# Patient Record
Sex: FEMALE | Race: BLACK OR AFRICAN AMERICAN | Employment: FULL TIME | URBAN - METROPOLITAN AREA
[De-identification: names, ages, dates, MRNs, and addresses within clinical notes are randomized per-mention and may not be internally consistent; named-entity substitution may affect disease eponyms.]

---

## 2017-02-27 DIAGNOSIS — E28.2 POLYCYSTIC OVARIAN DISEASE: ICD-10-CM

## 2017-03-01 RX ORDER — NORGESTIMATE AND ETHINYL ESTRADIOL
KIT
Qty: 28 TABLET | Refills: 5 | Status: SHIPPED | OUTPATIENT
Start: 2017-03-01 | End: 2018-07-06 | Stop reason: SDUPTHER

## 2017-03-03 DIAGNOSIS — E28.2 POLYCYSTIC OVARIAN DISEASE: ICD-10-CM

## 2017-03-03 RX ORDER — NORGESTIMATE AND ETHINYL ESTRADIOL 7DAYSX3 28
KIT ORAL
Qty: 28 TABLET | Refills: 5 | Status: CANCELLED | OUTPATIENT
Start: 2017-03-03

## 2017-03-03 RX ORDER — NORGESTIMATE AND ETHINYL ESTRADIOL 7DAYSX3 28
KIT ORAL
Qty: 28 TABLET | Refills: 1 | Status: SHIPPED | OUTPATIENT
Start: 2017-03-03 | End: 2017-09-25 | Stop reason: SDUPTHER

## 2017-03-17 ENCOUNTER — OFFICE VISIT (OUTPATIENT)
Dept: FAMILY MEDICINE CLINIC | Age: 27
End: 2017-03-17

## 2017-03-17 VITALS
DIASTOLIC BLOOD PRESSURE: 78 MMHG | HEART RATE: 87 BPM | TEMPERATURE: 97.5 F | SYSTOLIC BLOOD PRESSURE: 147 MMHG | BODY MASS INDEX: 77.73 KG/M2 | OXYGEN SATURATION: 97 % | WEIGHT: 293 LBS

## 2017-03-17 DIAGNOSIS — G47.33 OBSTRUCTIVE SLEEP APNEA: ICD-10-CM

## 2017-03-17 DIAGNOSIS — E66.01 MORBID OBESITY, UNSPECIFIED OBESITY TYPE (HCC): ICD-10-CM

## 2017-03-17 DIAGNOSIS — J30.9 ALLERGIC RHINITIS, UNSPECIFIED ALLERGIC RHINITIS TRIGGER, UNSPECIFIED RHINITIS SEASONALITY: ICD-10-CM

## 2017-03-17 DIAGNOSIS — E10.9 TYPE 1 DIABETES MELLITUS WITHOUT COMPLICATION (HCC): Primary | ICD-10-CM

## 2017-03-17 DIAGNOSIS — L72.3 SEBACEOUS CYST: ICD-10-CM

## 2017-03-17 DIAGNOSIS — E78.00 PURE HYPERCHOLESTEROLEMIA: ICD-10-CM

## 2017-03-17 LAB
A/G RATIO: 1.5 (ref 1.1–2.2)
ALBUMIN SERPL-MCNC: 4.3 G/DL (ref 3.4–5)
ALP BLD-CCNC: 89 U/L (ref 40–129)
ALT SERPL-CCNC: 10 U/L (ref 10–40)
ANION GAP SERPL CALCULATED.3IONS-SCNC: 18 MMOL/L (ref 3–16)
AST SERPL-CCNC: 9 U/L (ref 15–37)
BILIRUB SERPL-MCNC: 0.4 MG/DL (ref 0–1)
BUN BLDV-MCNC: 8 MG/DL (ref 7–20)
CALCIUM SERPL-MCNC: 8.9 MG/DL (ref 8.3–10.6)
CHLORIDE BLD-SCNC: 99 MMOL/L (ref 99–110)
CHOLESTEROL, TOTAL: 216 MG/DL (ref 0–199)
CO2: 24 MMOL/L (ref 21–32)
CREAT SERPL-MCNC: <0.5 MG/DL (ref 0.6–1.1)
CREATININE URINE: 92.1 MG/DL (ref 28–259)
GFR AFRICAN AMERICAN: >60
GFR NON-AFRICAN AMERICAN: >60
GLOBULIN: 2.9 G/DL
GLUCOSE BLD-MCNC: 353 MG/DL (ref 70–99)
HDLC SERPL-MCNC: 50 MG/DL (ref 40–60)
LDL CHOLESTEROL CALCULATED: 148 MG/DL
MICROALBUMIN UR-MCNC: <1.2 MG/DL
MICROALBUMIN/CREAT UR-RTO: NORMAL MG/G (ref 0–30)
POTASSIUM SERPL-SCNC: 4.2 MMOL/L (ref 3.5–5.1)
SODIUM BLD-SCNC: 141 MMOL/L (ref 136–145)
TOTAL PROTEIN: 7.2 G/DL (ref 6.4–8.2)
TRIGL SERPL-MCNC: 90 MG/DL (ref 0–150)
VLDLC SERPL CALC-MCNC: 18 MG/DL

## 2017-03-17 PROCEDURE — 99214 OFFICE O/P EST MOD 30 MIN: CPT | Performed by: FAMILY MEDICINE

## 2017-03-17 RX ORDER — SULFAMETHOXAZOLE AND TRIMETHOPRIM 800; 160 MG/1; MG/1
1 TABLET ORAL 2 TIMES DAILY
Qty: 20 TABLET | Refills: 0 | Status: SHIPPED | OUTPATIENT
Start: 2017-03-17 | End: 2017-03-27

## 2017-03-18 LAB
ESTIMATED AVERAGE GLUCOSE: 266.1 MG/DL
HBA1C MFR BLD: 10.9 %

## 2017-03-25 LAB — DIABETIC RETINOPATHY: NEGATIVE

## 2017-06-14 ENCOUNTER — TELEPHONE (OUTPATIENT)
Dept: SLEEP MEDICINE | Age: 27
End: 2017-06-14

## 2017-06-27 ENCOUNTER — TELEPHONE (OUTPATIENT)
Dept: SLEEP MEDICINE | Age: 27
End: 2017-06-27

## 2017-09-25 ENCOUNTER — OFFICE VISIT (OUTPATIENT)
Dept: FAMILY MEDICINE CLINIC | Age: 27
End: 2017-09-25

## 2017-09-25 VITALS
HEART RATE: 78 BPM | SYSTOLIC BLOOD PRESSURE: 138 MMHG | BODY MASS INDEX: 79.09 KG/M2 | WEIGHT: 293 LBS | OXYGEN SATURATION: 96 % | TEMPERATURE: 98.3 F | RESPIRATION RATE: 16 BRPM | DIASTOLIC BLOOD PRESSURE: 85 MMHG

## 2017-09-25 DIAGNOSIS — B37.2 CANDIDAL DERMATITIS: ICD-10-CM

## 2017-09-25 DIAGNOSIS — E10.9 TYPE 1 DIABETES MELLITUS WITHOUT COMPLICATION, WITH LONG-TERM CURRENT USE OF INSULIN (HCC): ICD-10-CM

## 2017-09-25 DIAGNOSIS — G44.209 TENSION HEADACHE: ICD-10-CM

## 2017-09-25 DIAGNOSIS — Z23 NEED FOR INFLUENZA VACCINATION: ICD-10-CM

## 2017-09-25 DIAGNOSIS — Z01.419 NORMAL GYNECOLOGIC EXAMINATION: Primary | ICD-10-CM

## 2017-09-25 DIAGNOSIS — J30.9 ALLERGIC RHINITIS, UNSPECIFIED ALLERGIC RHINITIS TRIGGER, UNSPECIFIED RHINITIS SEASONALITY: ICD-10-CM

## 2017-09-25 DIAGNOSIS — E28.2 POLYCYSTIC OVARIAN DISEASE: ICD-10-CM

## 2017-09-25 DIAGNOSIS — R11.0 NAUSEA: ICD-10-CM

## 2017-09-25 DIAGNOSIS — N89.8 VAGINAL IRRITATION: ICD-10-CM

## 2017-09-25 PROCEDURE — 90471 IMMUNIZATION ADMIN: CPT | Performed by: FAMILY MEDICINE

## 2017-09-25 PROCEDURE — 99395 PREV VISIT EST AGE 18-39: CPT | Performed by: FAMILY MEDICINE

## 2017-09-25 PROCEDURE — 90686 IIV4 VACC NO PRSV 0.5 ML IM: CPT | Performed by: FAMILY MEDICINE

## 2017-09-25 RX ORDER — LANCETS 30 GAUGE
EACH MISCELLANEOUS
Qty: 100 EACH | Refills: 5 | Status: SHIPPED | OUTPATIENT
Start: 2017-09-25 | End: 2019-01-23 | Stop reason: SDUPTHER

## 2017-09-25 RX ORDER — ALBUTEROL SULFATE 90 UG/1
2 AEROSOL, METERED RESPIRATORY (INHALATION) EVERY 6 HOURS PRN
Qty: 3 INHALER | Refills: 3 | Status: SHIPPED | OUTPATIENT
Start: 2017-09-25 | End: 2019-10-06 | Stop reason: SDUPTHER

## 2017-09-25 RX ORDER — MONTELUKAST SODIUM 10 MG/1
10 TABLET ORAL NIGHTLY
Qty: 30 TABLET | Refills: 5 | Status: SHIPPED | OUTPATIENT
Start: 2017-09-25 | End: 2018-07-06 | Stop reason: SDUPTHER

## 2017-09-25 RX ORDER — CYCLOBENZAPRINE HCL 10 MG
TABLET ORAL
Qty: 45 TABLET | Refills: 0 | Status: SHIPPED | OUTPATIENT
Start: 2017-09-25 | End: 2017-11-15 | Stop reason: SDUPTHER

## 2017-09-25 RX ORDER — GLUCOSAMINE HCL/CHONDROITIN SU 500-400 MG
CAPSULE ORAL
Qty: 100 STRIP | Refills: 5 | Status: SHIPPED | OUTPATIENT
Start: 2017-09-25 | End: 2019-04-29

## 2017-09-25 RX ORDER — PROMETHAZINE HYDROCHLORIDE 25 MG/1
25 TABLET ORAL EVERY 8 HOURS PRN
Qty: 40 TABLET | Refills: 0 | Status: SHIPPED | OUTPATIENT
Start: 2017-09-25 | End: 2018-07-06 | Stop reason: SDUPTHER

## 2017-09-25 RX ORDER — FLUCONAZOLE 150 MG/1
150 TABLET ORAL ONCE
Qty: 1 TABLET | Refills: 0 | Status: SHIPPED | OUTPATIENT
Start: 2017-09-25 | End: 2017-09-25

## 2017-09-25 RX ORDER — NORGESTIMATE AND ETHINYL ESTRADIOL 7DAYSX3 28
KIT ORAL
Qty: 28 TABLET | Refills: 5 | Status: SHIPPED | OUTPATIENT
Start: 2017-09-25 | End: 2017-12-29 | Stop reason: SDUPTHER

## 2017-09-25 ASSESSMENT — PATIENT HEALTH QUESTIONNAIRE - PHQ9
SUM OF ALL RESPONSES TO PHQ QUESTIONS 1-9: 0
2. FEELING DOWN, DEPRESSED OR HOPELESS: 0
SUM OF ALL RESPONSES TO PHQ9 QUESTIONS 1 & 2: 0
1. LITTLE INTEREST OR PLEASURE IN DOING THINGS: 0

## 2017-09-26 LAB
C TRACH DNA GENITAL QL NAA+PROBE: NEGATIVE
CANDIDA SPECIES, DNA PROBE: NORMAL
GARDNERELLA VAGINALIS, DNA PROBE: NORMAL
N. GONORRHOEAE DNA: NEGATIVE
TRICHOMONAS VAGINALIS DNA: NORMAL

## 2017-09-27 LAB
GENITAL CULTURE, ROUTINE: ABNORMAL
GENITAL CULTURE, ROUTINE: ABNORMAL
ORGANISM: ABNORMAL

## 2017-11-15 ENCOUNTER — OFFICE VISIT (OUTPATIENT)
Dept: FAMILY MEDICINE CLINIC | Age: 27
End: 2017-11-15

## 2017-11-15 VITALS
OXYGEN SATURATION: 94 % | SYSTOLIC BLOOD PRESSURE: 135 MMHG | TEMPERATURE: 97.7 F | HEART RATE: 77 BPM | RESPIRATION RATE: 18 BRPM | BODY MASS INDEX: 80.92 KG/M2 | DIASTOLIC BLOOD PRESSURE: 85 MMHG | WEIGHT: 293 LBS

## 2017-11-15 DIAGNOSIS — S46.812A STRAIN OF LEFT TRAPEZIUS MUSCLE, INITIAL ENCOUNTER: ICD-10-CM

## 2017-11-15 DIAGNOSIS — E78.00 PURE HYPERCHOLESTEROLEMIA: ICD-10-CM

## 2017-11-15 DIAGNOSIS — S16.1XXA STRAIN OF STERNOCLEIDOMASTOID MUSCLE, INITIAL ENCOUNTER: Primary | ICD-10-CM

## 2017-11-15 DIAGNOSIS — E10.9 TYPE 1 DIABETES MELLITUS WITHOUT COMPLICATION (HCC): ICD-10-CM

## 2017-11-15 DIAGNOSIS — G44.209 TENSION HEADACHE: ICD-10-CM

## 2017-11-15 PROCEDURE — 99214 OFFICE O/P EST MOD 30 MIN: CPT | Performed by: FAMILY MEDICINE

## 2017-11-15 RX ORDER — DICLOFENAC SODIUM 75 MG/1
75 TABLET, DELAYED RELEASE ORAL 2 TIMES DAILY
Qty: 60 TABLET | Refills: 1 | Status: SHIPPED | OUTPATIENT
Start: 2017-11-15 | End: 2019-01-23 | Stop reason: ALTCHOICE

## 2017-11-15 RX ORDER — OXYCODONE HYDROCHLORIDE AND ACETAMINOPHEN 5; 325 MG/1; MG/1
TABLET ORAL
Qty: 25 TABLET | Refills: 0 | Status: SHIPPED | OUTPATIENT
Start: 2017-11-15 | End: 2017-11-22

## 2017-11-15 RX ORDER — CYCLOBENZAPRINE HCL 10 MG
TABLET ORAL
Qty: 45 TABLET | Refills: 1 | Status: SHIPPED | OUTPATIENT
Start: 2017-11-15 | End: 2018-02-12 | Stop reason: SDUPTHER

## 2017-11-15 RX ORDER — DICLOFENAC SODIUM 75 MG/1
75 TABLET, DELAYED RELEASE ORAL 2 TIMES DAILY
Qty: 60 TABLET | Refills: 1 | Status: SHIPPED | OUTPATIENT
Start: 2017-11-15 | End: 2017-11-15 | Stop reason: SDUPTHER

## 2017-11-15 NOTE — PATIENT INSTRUCTIONS
Patient Education        Neck Strain or Sprain: Rehab Exercises  Your Care Instructions  Here are some examples of typical rehabilitation exercises for your condition. Start each exercise slowly. Ease off the exercise if you start to have pain. Your doctor or physical therapist will tell you when you can start these exercises and which ones will work best for you. How to do the exercises  Neck rotation    1. Sit in a firm chair, or stand up straight. 2. Keeping your chin level, turn your head to the right, and hold for 15 to 30 seconds. 3. Turn your head to the left and hold for 15 to 30 seconds. 4. Repeat 2 to 4 times to each side. Neck stretches    1. Look straight ahead, and tip your right ear to your right shoulder. Do not let your left shoulder rise up as you tip your head to the right. 2. Hold for 15 to 30 seconds. 3. Tilt your head to the left. Do not let your right shoulder rise up as you tip your head to the left. 4. Hold for 15 to 30 seconds. 5. Repeat 2 to 4 times to each side. Forward neck flexion    1. Sit in a firm chair, or stand up straight. 2. Bend your head forward. 3. Hold for 15 to 30 seconds. 4. Repeat 2 to 4 times. Lateral (side) bend strengthening    1. With your right hand, place your first two fingers on your right temple. 2. Start to bend your head to the side while using gentle pressure from your fingers to keep your head from bending. 3. Hold for about 6 seconds. 4. Repeat 8 to 12 times. 5. Switch hands and repeat the same exercise on your left side. Forward bend strengthening    1. Place your first two fingers of either hand on your forehead. 2. Start to bend your head forward while using gentle pressure from your fingers to keep your head from bending. 3. Hold for about 6 seconds. 4. Repeat 8 to 12 times. Neutral position strengthening    1. Using one hand, place your fingertips on the back of your head at the top of your neck.   2. Start to bend your head backward while using gentle pressure from your fingers to keep your head from bending. 3. Hold for about 6 seconds. 4. Repeat 8 to 12 times. Chin tuck    1. Lie on the floor with a rolled-up towel under your neck. Your head should be touching the floor. 2. Slowly bring your chin toward your chest.  3. Hold for a count of 6, and then relax for up to 10 seconds. 4. Repeat 8 to 12 times. Follow-up care is a key part of your treatment and safety. Be sure to make and go to all appointments, and call your doctor if you are having problems. It's also a good idea to know your test results and keep a list of the medicines you take. Where can you learn more? Go to [email or web address] and sign in to your [employer] account. Enter M679 in the [employer] box to learn more about \"Neck Strain or Sprain: Rehab Exercises. \"     If you do not have an account, please click on the \"Sign Up Now\" link. Current as of: March 21, 2017  Content Version: 11.3  © 8042-7694 [employer]. Care instructions adapted under license by [facility] ([facility]). If you have questions about a medical condition or this instruction, always ask your healthcare professional. [place] [address] any warranty or liability for your use of this information. Patient Education        Neck Strain or Sprain: Rehab Exercises  Your Care Instructions  Here are some examples of typical rehabilitation exercises for your condition. Start each exercise slowly. Ease off the exercise if you start to have pain. Your doctor or physical therapist will tell you when you can start these exercises and which ones will work best for you. How to do the exercises  Neck rotation    5. Sit in a firm chair, or stand up straight. 6. Keeping your chin level, turn your head to the right, and hold for 15 to 30 seconds. 7. Turn your head to the left and hold for 15 to 30 seconds. 8. Repeat 2 to 4 times to each side.   Neck more?  Go to [email or web address] and sign in to your [employer] account. Enter M679 in the [employer] box to learn more about \"Neck Strain or Sprain: Rehab Exercises. \"     If you do not have an account, please click on the \"Sign Up Now\" link. Current as of: March 21, 2017  Content Version: 11.3  © 3821-5342 [employer]. Care instructions adapted under license by [facility] ([facility]). If you have questions about a medical condition or this instruction, always ask your healthcare professional. [address] any warranty or liability for your use of this information.

## 2017-11-15 NOTE — PROGRESS NOTES
Patient is here for neck pain , which is greater on left > right. It started on Friday am upon awakening. It progressed on Saturday and Sunday . She is struggling to turn her head due to pain. Left shoulder pain,but no radiation further . No weakness to arm. Turning and moving head aggravates the pain. She has been taking 4 pills of 500 mg of Acetaminophen . Using Flexeril q 8 hours and not helping. She uses a lot of pillows- 15. Last night she slept better in parents' bed in [facility] [place]. No trauma or lifting. Her Blood sugars have been 100-300s. Taking Toujeo 2 clicks and Trulicity 1.5 weekly. ROS: All other systems were reviewed and are negative . Patient's allergies and medications were reviewed. Patient's past medical, surgical, social , and family history were reviewed. OBJECTIVE:  /85   Pulse 77   Temp 97.7 °F (36.5 °C) (Oral)   Resp 18   Wt (!) 442 lb 6.4 oz (200.7 kg)   SpO2 94%   BMI 80.92 kg/m²   General: NAD, cooperative, alert and oriented X 3. Mood / affect is good. good insight. well hydrated. Neck : no lymphadenopathy, supple, decreased ROM due to pain. Left trapezius and left sternocleidomastoid with tenderness. No edema or erythema. Upper extremities : DTRs 2+ biceps/ triceps/ brachioradialis bilateral.  FROM. Strength 5/5. CV: Regular rate and rhythm , no murmurs/ rub/ gallop. No edema. Lungs : CTA bilaterally, breathing comfortably  Abdomen: positive bowel sounds, soft , non tender, non distended. No hepatosplenomegaly. No CVA tenderness. Skin: no rashes. Non tender. ASSESSMENT/  PLAN:  Jomar Polk was seen today for other. Diagnoses and all orders for this visit:    Strain of sternocleidomastoid muscle, initial encounter  -     Moist heat . ROM exercises. Modify activities. -     Diclofenac (VOLTAREN) 75 MG EC tablet;  Take 1 tablet by mouth 2 times daily Prn left neck pain/ left chest pain  -     oxyCODONE-acetaminophen (PERCOCET) 5-325 MG per tablet; 1-2 tabs PO Q 4-6 hrs prn pain with food. -     cyclobenzaprine (FLEXERIL) 10 MG tablet; Take 1 po q 8 hours prn neck pain/ tension headaches. -     Controlled Substances Monitoring:   Attestation: The Prescription Monitoring Report for this patient was reviewed today. Vira Sorto MD)  Documentation: Possible medication side effects, risk of tolerance and/or dependence, and alternative treatments discussed., No signs of potential drug abuse or diversion identified. (Vira Sorto MD)   Strain of left trapezius muscle, initial encounter  -     Moist heat . ROM exercises. Modify activities. -     Diclofenac (VOLTAREN) 75 MG EC tablet; Take 1 tablet by mouth 2 times daily Prn left neck pain/ left chest pain  -     oxyCODONE-acetaminophen (PERCOCET) 5-325 MG per tablet; 1-2 tabs PO Q 4-6 hrs prn pain with food. .  -     cyclobenzaprine (FLEXERIL) 10 MG tablet; Take 1 po q 8 hours prn neck pain/ tension headaches. -     Controlled Substances Monitoring:   Attestation: The Prescription Monitoring Report for this patient was reviewed today. Vira Sorto MD)  Documentation: Possible medication side effects, risk of tolerance and/or dependence, and alternative treatments discussed., No signs of potential drug abuse or diversion identified. Vira Sorto MD)   Pure hypercholesterolemia  -     Comprehensive Metabolic Panel; Future, Lipid Panel; Future        -     Follow low cholesterol diet. Type 1 diabetes mellitus without complication (HCC)  -     Hemoglobin A1C; Future, Comprehensive Metabolic Panel; Future        -     Continue Toujeo 415 U qd , Trulicity 1.5 weekly and dietary/ lifestyle modifications. Follow up if no improvement in 2- 4 weeks/ as needed for increased symptoms.

## 2017-12-29 ENCOUNTER — OFFICE VISIT (OUTPATIENT)
Dept: FAMILY MEDICINE CLINIC | Age: 27
End: 2017-12-29

## 2017-12-29 VITALS
SYSTOLIC BLOOD PRESSURE: 133 MMHG | BODY MASS INDEX: 79.93 KG/M2 | DIASTOLIC BLOOD PRESSURE: 68 MMHG | RESPIRATION RATE: 20 BRPM | TEMPERATURE: 98 F | WEIGHT: 293 LBS | OXYGEN SATURATION: 97 % | HEART RATE: 92 BPM

## 2017-12-29 DIAGNOSIS — B37.2 CANDIDAL DERMATITIS: ICD-10-CM

## 2017-12-29 DIAGNOSIS — E10.9 TYPE 1 DIABETES MELLITUS WITHOUT COMPLICATION (HCC): ICD-10-CM

## 2017-12-29 DIAGNOSIS — E10.9 TYPE 1 DIABETES MELLITUS WITHOUT COMPLICATION (HCC): Primary | ICD-10-CM

## 2017-12-29 DIAGNOSIS — R51.9 CHRONIC DAILY HEADACHE: ICD-10-CM

## 2017-12-29 DIAGNOSIS — G47.33 OSA (OBSTRUCTIVE SLEEP APNEA): ICD-10-CM

## 2017-12-29 DIAGNOSIS — E66.01 MORBID OBESITY (HCC): ICD-10-CM

## 2017-12-29 DIAGNOSIS — E78.00 PURE HYPERCHOLESTEROLEMIA: ICD-10-CM

## 2017-12-29 DIAGNOSIS — B37.31 VAGINAL CANDIDIASIS: ICD-10-CM

## 2017-12-29 DIAGNOSIS — E55.9 VITAMIN D DEFICIENCY: ICD-10-CM

## 2017-12-29 DIAGNOSIS — E28.2 POLYCYSTIC OVARIAN DISEASE: ICD-10-CM

## 2017-12-29 DIAGNOSIS — J45.909 MILD ASTHMA WITHOUT COMPLICATION, UNSPECIFIED WHETHER PERSISTENT: ICD-10-CM

## 2017-12-29 LAB
A/G RATIO: 1.6 (ref 1.1–2.2)
ALBUMIN SERPL-MCNC: 4.4 G/DL (ref 3.4–5)
ALP BLD-CCNC: 78 U/L (ref 40–129)
ALT SERPL-CCNC: 22 U/L (ref 10–40)
ANION GAP SERPL CALCULATED.3IONS-SCNC: 17 MMOL/L (ref 3–16)
AST SERPL-CCNC: 17 U/L (ref 15–37)
BILIRUB SERPL-MCNC: 0.6 MG/DL (ref 0–1)
BUN BLDV-MCNC: 9 MG/DL (ref 7–20)
CALCIUM SERPL-MCNC: 9.1 MG/DL (ref 8.3–10.6)
CHLORIDE BLD-SCNC: 95 MMOL/L (ref 99–110)
CHOLESTEROL, TOTAL: 229 MG/DL (ref 0–199)
CO2: 24 MMOL/L (ref 21–32)
CREAT SERPL-MCNC: <0.5 MG/DL (ref 0.6–1.1)
CREATININE URINE: 63.5 MG/DL (ref 28–259)
GFR AFRICAN AMERICAN: >60
GFR NON-AFRICAN AMERICAN: >60
GLOBULIN: 2.7 G/DL
GLUCOSE BLD-MCNC: 332 MG/DL (ref 70–99)
HDLC SERPL-MCNC: 48 MG/DL (ref 40–60)
LDL CHOLESTEROL CALCULATED: 147 MG/DL
MICROALBUMIN UR-MCNC: <1.2 MG/DL
MICROALBUMIN/CREAT UR-RTO: NORMAL MG/G (ref 0–30)
POTASSIUM SERPL-SCNC: 4.4 MMOL/L (ref 3.5–5.1)
SODIUM BLD-SCNC: 136 MMOL/L (ref 136–145)
TOTAL PROTEIN: 7.1 G/DL (ref 6.4–8.2)
TRIGL SERPL-MCNC: 170 MG/DL (ref 0–150)
VITAMIN D 25-HYDROXY: 13 NG/ML
VLDLC SERPL CALC-MCNC: 34 MG/DL

## 2017-12-29 PROCEDURE — 99214 OFFICE O/P EST MOD 30 MIN: CPT | Performed by: FAMILY MEDICINE

## 2017-12-29 RX ORDER — TOPIRAMATE 100 MG/1
100 TABLET, FILM COATED ORAL NIGHTLY
Qty: 30 TABLET | Refills: 5 | Status: SHIPPED | OUTPATIENT
Start: 2017-12-29 | End: 2018-02-12 | Stop reason: SDUPTHER

## 2017-12-29 RX ORDER — NORGESTIMATE AND ETHINYL ESTRADIOL 7DAYSX3 28
KIT ORAL
Qty: 28 TABLET | Refills: 5 | Status: SHIPPED | OUTPATIENT
Start: 2017-12-29 | End: 2018-02-12 | Stop reason: SDUPTHER

## 2017-12-29 RX ORDER — FLUCONAZOLE 150 MG/1
150 TABLET ORAL ONCE
Qty: 2 TABLET | Refills: 0 | Status: SHIPPED | OUTPATIENT
Start: 2017-12-29 | End: 2017-12-29

## 2017-12-29 NOTE — PROGRESS NOTES
SUBJECTIVE:  33 y.o. Zahraa Kay female  for follow up of diabetes, hypertension, and hypercholesterolemia. Diabetic Review of Systems - medication compliance: noncompliant some of the time, diabetic diet compliance: noncompliant some of the time, home glucose monitoring: fasting values range 215-395, further diabetic ROS: no polyuria or polydipsia, no chest pain, dyspnea or TIA's, no numbness, tingling or pain in extremities, last eye exam approximately 3/17 . HgbA1c= 10.9 in 3/17. Has not done labs. She is taking Trulicity more regularly . She went up on the Trulicity 1.5 mg ago  3 weeks or so. She takes it on Sundays. She admits to forgetting Toujeo. She was at 50% and now 5/7 days per week. She tends to go to bed early at 8-9 pm .  She eats dinner on the way home usually. She eats a lot of [place] bowl. She is having intermittent vaginal itching. She would like a refill of Diflucan and Miconazole. Current Outpatient Prescriptions   Medication Sig Dispense Refill    cyclobenzaprine (FLEXERIL) 10 MG tablet Take 1 po q 8 hours prn neck pain/ tension headaches. 45 tablet 1    Norgestim-Eth Estrad Triphasic (TRI-PREVIFEM) 0.18/0.215/0.25 MG-35 MCG TABS TAKE 1 TABLET BY MOUTH ONCE DAILY 28 tablet 5    albuterol sulfate HFA (VENTOLIN HFA) 108 (90 Base) MCG/ACT inhaler Inhale 2 puffs into the lungs every 6 hours as needed for Wheezing 3 Inhaler 3    montelukast (SINGULAIR) 10 MG tablet Take 1 tablet by mouth nightly 30 tablet 5    insulin glargine (TOUJEO SOLOSTAR) 300 UNIT/ML injection pen Inject 140 U nightly.  12 Pen 5    promethazine (PHENERGAN) 25 MG tablet Take 1 tablet by mouth every 8 hours as needed for Nausea (headache) 40 tablet 0    Dulaglutide (TRULICITY) 1.5 HL/8.2YS SOPN Inject 1.5 mg weekly 4 Pen 5    topiramate (TOPAMAX) 100 MG tablet Take 1 tablet by mouth nightly 30 tablet 2    ondansetron (ZOFRAN) 8 MG tablet Take 1 tablet by mouth every 8 hours as needed for Nausea 60 tablet 1   

## 2017-12-30 LAB
ESTIMATED AVERAGE GLUCOSE: 277.6 MG/DL
HBA1C MFR BLD: 11.3 %

## 2018-02-12 DIAGNOSIS — R51.9 CHRONIC DAILY HEADACHE: ICD-10-CM

## 2018-02-12 DIAGNOSIS — E28.2 POLYCYSTIC OVARIAN DISEASE: ICD-10-CM

## 2018-02-12 DIAGNOSIS — S16.1XXA STRAIN OF STERNOCLEIDOMASTOID MUSCLE, INITIAL ENCOUNTER: ICD-10-CM

## 2018-02-12 DIAGNOSIS — S46.812A STRAIN OF LEFT TRAPEZIUS MUSCLE, INITIAL ENCOUNTER: ICD-10-CM

## 2018-02-12 RX ORDER — NORGESTIMATE AND ETHINYL ESTRADIOL 7DAYSX3 28
KIT ORAL
Qty: 28 TABLET | Refills: 5 | Status: SHIPPED | OUTPATIENT
Start: 2018-02-12 | End: 2018-11-12 | Stop reason: SDUPTHER

## 2018-02-12 RX ORDER — TOPIRAMATE 100 MG/1
100 TABLET, FILM COATED ORAL NIGHTLY
Qty: 30 TABLET | Refills: 5 | Status: SHIPPED | OUTPATIENT
Start: 2018-02-12 | End: 2020-09-25 | Stop reason: ALTCHOICE

## 2018-02-12 RX ORDER — CYCLOBENZAPRINE HCL 10 MG
TABLET ORAL
Qty: 45 TABLET | Refills: 1 | Status: SHIPPED | OUTPATIENT
Start: 2018-02-12 | End: 2018-07-06 | Stop reason: SDUPTHER

## 2018-04-26 ENCOUNTER — TELEPHONE (OUTPATIENT)
Dept: FAMILY MEDICINE CLINIC | Age: 28
End: 2018-04-26

## 2018-04-27 ENCOUNTER — TELEPHONE (OUTPATIENT)
Dept: ORTHOPEDIC SURGERY | Age: 28
End: 2018-04-27

## 2018-07-06 ENCOUNTER — OFFICE VISIT (OUTPATIENT)
Dept: FAMILY MEDICINE CLINIC | Age: 28
End: 2018-07-06

## 2018-07-06 VITALS
HEART RATE: 83 BPM | OXYGEN SATURATION: 97 % | WEIGHT: 293 LBS | BODY MASS INDEX: 51.91 KG/M2 | DIASTOLIC BLOOD PRESSURE: 82 MMHG | TEMPERATURE: 97.3 F | HEIGHT: 63 IN | SYSTOLIC BLOOD PRESSURE: 132 MMHG | RESPIRATION RATE: 14 BRPM

## 2018-07-06 DIAGNOSIS — Z11.4 ENCOUNTER FOR SCREENING FOR HIV: ICD-10-CM

## 2018-07-06 DIAGNOSIS — B37.31 VAGINAL CANDIDIASIS: ICD-10-CM

## 2018-07-06 DIAGNOSIS — S46.812A STRAIN OF LEFT TRAPEZIUS MUSCLE, INITIAL ENCOUNTER: ICD-10-CM

## 2018-07-06 DIAGNOSIS — S16.1XXA STRAIN OF STERNOCLEIDOMASTOID MUSCLE, INITIAL ENCOUNTER: ICD-10-CM

## 2018-07-06 DIAGNOSIS — E78.00 PURE HYPERCHOLESTEROLEMIA: ICD-10-CM

## 2018-07-06 DIAGNOSIS — R11.0 NAUSEA: ICD-10-CM

## 2018-07-06 DIAGNOSIS — E10.9 TYPE 1 DIABETES MELLITUS WITHOUT COMPLICATION (HCC): ICD-10-CM

## 2018-07-06 DIAGNOSIS — E10.9 TYPE 1 DIABETES MELLITUS WITHOUT COMPLICATION, WITH LONG-TERM CURRENT USE OF INSULIN (HCC): ICD-10-CM

## 2018-07-06 DIAGNOSIS — E10.9 TYPE 1 DIABETES MELLITUS WITHOUT COMPLICATION, WITH LONG-TERM CURRENT USE OF INSULIN (HCC): Primary | ICD-10-CM

## 2018-07-06 DIAGNOSIS — Z23 NEED FOR PNEUMOCOCCAL VACCINE: ICD-10-CM

## 2018-07-06 DIAGNOSIS — E28.2 POLYCYSTIC OVARIAN DISEASE: ICD-10-CM

## 2018-07-06 LAB
A/G RATIO: 1.7 (ref 1.1–2.2)
ALBUMIN SERPL-MCNC: 4.5 G/DL (ref 3.4–5)
ALP BLD-CCNC: 77 U/L (ref 40–129)
ALT SERPL-CCNC: 15 U/L (ref 10–40)
ANION GAP SERPL CALCULATED.3IONS-SCNC: 15 MMOL/L (ref 3–16)
AST SERPL-CCNC: 14 U/L (ref 15–37)
BILIRUB SERPL-MCNC: 0.7 MG/DL (ref 0–1)
BUN BLDV-MCNC: 7 MG/DL (ref 7–20)
CALCIUM SERPL-MCNC: 9.2 MG/DL (ref 8.3–10.6)
CHLORIDE BLD-SCNC: 98 MMOL/L (ref 99–110)
CHOLESTEROL, TOTAL: 199 MG/DL (ref 0–199)
CO2: 24 MMOL/L (ref 21–32)
CREAT SERPL-MCNC: <0.5 MG/DL (ref 0.6–1.1)
GFR AFRICAN AMERICAN: >60
GFR NON-AFRICAN AMERICAN: >60
GLOBULIN: 2.6 G/DL
GLUCOSE BLD-MCNC: 271 MG/DL (ref 70–99)
HDLC SERPL-MCNC: 50 MG/DL (ref 40–60)
LDL CHOLESTEROL CALCULATED: 129 MG/DL
POTASSIUM SERPL-SCNC: 4.1 MMOL/L (ref 3.5–5.1)
SODIUM BLD-SCNC: 137 MMOL/L (ref 136–145)
TOTAL PROTEIN: 7.1 G/DL (ref 6.4–8.2)
TRIGL SERPL-MCNC: 98 MG/DL (ref 0–150)
VLDLC SERPL CALC-MCNC: 20 MG/DL

## 2018-07-06 PROCEDURE — 99214 OFFICE O/P EST MOD 30 MIN: CPT | Performed by: FAMILY MEDICINE

## 2018-07-06 PROCEDURE — 90732 PPSV23 VACC 2 YRS+ SUBQ/IM: CPT | Performed by: FAMILY MEDICINE

## 2018-07-06 PROCEDURE — 90471 IMMUNIZATION ADMIN: CPT | Performed by: FAMILY MEDICINE

## 2018-07-06 RX ORDER — NORGESTIMATE AND ETHINYL ESTRADIOL 7DAYSX3 28
KIT ORAL
Qty: 28 TABLET | Refills: 5 | Status: SHIPPED | OUTPATIENT
Start: 2018-07-06 | End: 2019-10-06 | Stop reason: SDUPTHER

## 2018-07-06 RX ORDER — PROMETHAZINE HYDROCHLORIDE 25 MG/1
25 TABLET ORAL EVERY 8 HOURS PRN
Qty: 40 TABLET | Refills: 0 | Status: SHIPPED | OUTPATIENT
Start: 2018-07-06 | End: 2020-09-25

## 2018-07-06 RX ORDER — FLUCONAZOLE 150 MG/1
150 TABLET ORAL ONCE
Qty: 1 TABLET | Refills: 1 | Status: SHIPPED | OUTPATIENT
Start: 2018-07-06 | End: 2018-07-06

## 2018-07-06 RX ORDER — CYCLOBENZAPRINE HCL 10 MG
TABLET ORAL
Qty: 45 TABLET | Refills: 1 | Status: SHIPPED | OUTPATIENT
Start: 2018-07-06 | End: 2019-03-28 | Stop reason: SDUPTHER

## 2018-07-06 RX ORDER — MONTELUKAST SODIUM 10 MG/1
10 TABLET ORAL NIGHTLY
Qty: 30 TABLET | Refills: 5 | Status: SHIPPED | OUTPATIENT
Start: 2018-07-06 | End: 2021-06-28 | Stop reason: SDUPTHER

## 2018-07-06 NOTE — PROGRESS NOTES
hepatosplenomegaly. Feet: normal sensation to monofilament bilaterally, no ulcers. DP/ PT pulses normal.   Skin: no rashes. Non tender. ASSESSMENT:  1. Type 1 diabetes mellitus without complication, with long-term current use of insulin (HCC)  - uncontrolled likely . Pending labs. - Dulaglutide (TRULICITY) 1.5 UB/0.5IG SOPN; Inject 1.5 mg weekly  Dispense: 4 pen; Refill: 5  - may need to increase further - insulin glargine (TOUJEO SOLOSTAR) 300 UNIT/ML injection pen; Inject 140 U nightly. Dispense: 12 pen; Refill: 5  - prefer to add GLP-1 weekly and have the patient also work on dietary/ lifestyle modifications. - Comprehensive Metabolic Panel; Standing,  Lipid Panel; Standing  - Hemoglobin A1C; Standing  - FOOT EXAM.   2. Polycystic ovarian disease/ PCOS (polycystic ovarian syndrome)  - stable. - Norgestim-Eth Estrad Triphasic (TRI-PREVIFEM) 0.18/0.215/0.25 MG-35 MCG TABS; TAKE 1 TABLET BY MOUTH ONCE DAILY  Dispense: 28 tablet; Refill: 5    3. Strain of sternocleidomastoid muscle,4. Strain of left trapezius   - Moist heat . ROM exercises. Modify activities. - cyclobenzaprine (FLEXERIL) 10 MG tablet; Take 1 po q 8 hours prn neck pain/ tension headaches. Dispense: 45 tablet; Refill: 1. Side effects of medication discussed including sedation and addiction potential.     5. Nausea  - bland diet recommended. - promethazine (PHENERGAN) 25 MG tablet; Take 1 tablet by mouth every 8 hours as needed for Nausea (headache)  Dispense: 40 tablet; Refill: 0    6. Pure hypercholesterolemia  -  Follow low cholesterol diet. Encouraged CV exercise > 150 minutes/ week. - Comprehensive Metabolic Panel; Standing, Lipid Panel; Standing    7. Vaginal candidiasis  - discussed lack of Blood sugar / diabetic control contributing.   - fluconazole (DIFLUCAN) 150 MG tablet; Take 1 tablet by mouth once for 1 dose  Dispense: 1 tablet;  Refill: 1  - miconazole (MICONAZOLE 7) 2 % vaginal cream; Apply bid to vaginal area/

## 2018-07-07 LAB
ESTIMATED AVERAGE GLUCOSE: 289.1 MG/DL
HBA1C MFR BLD: 11.7 %
HIV AG/AB: NORMAL
HIV ANTIGEN: NORMAL
HIV-1 ANTIBODY: NORMAL
HIV-2 AB: NORMAL

## 2018-11-12 ENCOUNTER — OFFICE VISIT (OUTPATIENT)
Dept: FAMILY MEDICINE CLINIC | Age: 28
End: 2018-11-12
Payer: COMMERCIAL

## 2018-11-12 VITALS
SYSTOLIC BLOOD PRESSURE: 135 MMHG | DIASTOLIC BLOOD PRESSURE: 80 MMHG | BODY MASS INDEX: 76.35 KG/M2 | WEIGHT: 293 LBS | RESPIRATION RATE: 22 BRPM | HEART RATE: 77 BPM | OXYGEN SATURATION: 93 %

## 2018-11-12 DIAGNOSIS — E28.2 POLYCYSTIC OVARIAN DISEASE: ICD-10-CM

## 2018-11-12 DIAGNOSIS — G47.33 OSA (OBSTRUCTIVE SLEEP APNEA): ICD-10-CM

## 2018-11-12 DIAGNOSIS — J45.909 MILD ASTHMA WITHOUT COMPLICATION, UNSPECIFIED WHETHER PERSISTENT: ICD-10-CM

## 2018-11-12 DIAGNOSIS — Z23 NEED FOR INFLUENZA VACCINATION: ICD-10-CM

## 2018-11-12 DIAGNOSIS — E10.9 TYPE 1 DIABETES MELLITUS WITHOUT COMPLICATION (HCC): Primary | ICD-10-CM

## 2018-11-12 DIAGNOSIS — E78.00 PURE HYPERCHOLESTEROLEMIA: ICD-10-CM

## 2018-11-12 PROCEDURE — 90471 IMMUNIZATION ADMIN: CPT | Performed by: FAMILY MEDICINE

## 2018-11-12 PROCEDURE — 90686 IIV4 VACC NO PRSV 0.5 ML IM: CPT | Performed by: FAMILY MEDICINE

## 2018-11-12 PROCEDURE — 99214 OFFICE O/P EST MOD 30 MIN: CPT | Performed by: FAMILY MEDICINE

## 2018-11-12 RX ORDER — NORGESTIMATE AND ETHINYL ESTRADIOL 7DAYSX3 28
KIT ORAL
Qty: 28 TABLET | Refills: 5 | Status: SHIPPED | OUTPATIENT
Start: 2018-11-12 | End: 2019-01-23 | Stop reason: SDUPTHER

## 2018-11-12 ASSESSMENT — PATIENT HEALTH QUESTIONNAIRE - PHQ9
1. LITTLE INTEREST OR PLEASURE IN DOING THINGS: 1
SUM OF ALL RESPONSES TO PHQ9 QUESTIONS 1 & 2: 2
SUM OF ALL RESPONSES TO PHQ QUESTIONS 1-9: 2
2. FEELING DOWN, DEPRESSED OR HOPELESS: 1
SUM OF ALL RESPONSES TO PHQ QUESTIONS 1-9: 2

## 2018-11-12 NOTE — PROGRESS NOTES
Vaccine Information Sheet, \"Influenza - Inactivated\"  given to Lisette Stovall, or parent/legal guardian of  Lisette Stovall and verbalized understanding. Patient responses:    Have you ever had a reaction to a flu vaccine? No  Are you able to eat eggs without adverse effects? Yes  Do you have any current illness? No  Have you ever had Guillian [place] Syndrome? No    Flu vaccine given per order. Please see immunization tab.

## 2019-01-23 ENCOUNTER — OFFICE VISIT (OUTPATIENT)
Dept: ENDOCRINOLOGY | Age: 29
End: 2019-01-23
Payer: COMMERCIAL

## 2019-01-23 VITALS
RESPIRATION RATE: 16 BRPM | DIASTOLIC BLOOD PRESSURE: 91 MMHG | HEIGHT: 64 IN | SYSTOLIC BLOOD PRESSURE: 139 MMHG | OXYGEN SATURATION: 99 % | BODY MASS INDEX: 50.02 KG/M2 | WEIGHT: 293 LBS | HEART RATE: 94 BPM

## 2019-01-23 DIAGNOSIS — E10.9 TYPE 1 DIABETES MELLITUS WITHOUT COMPLICATION (HCC): ICD-10-CM

## 2019-01-23 DIAGNOSIS — E78.00 PURE HYPERCHOLESTEROLEMIA: ICD-10-CM

## 2019-01-23 DIAGNOSIS — E66.01 MORBID OBESITY (HCC): ICD-10-CM

## 2019-01-23 DIAGNOSIS — E78.49 OTHER HYPERLIPIDEMIA: ICD-10-CM

## 2019-01-23 LAB
A/G RATIO: 1.7 (ref 1.1–2.2)
ALBUMIN SERPL-MCNC: 5 G/DL (ref 3.4–5)
ALP BLD-CCNC: 74 U/L (ref 40–129)
ALT SERPL-CCNC: 14 U/L (ref 10–40)
ANION GAP SERPL CALCULATED.3IONS-SCNC: 17 MMOL/L (ref 3–16)
AST SERPL-CCNC: 12 U/L (ref 15–37)
BILIRUB SERPL-MCNC: 0.7 MG/DL (ref 0–1)
BUN BLDV-MCNC: 10 MG/DL (ref 7–20)
CALCIUM SERPL-MCNC: 9.5 MG/DL (ref 8.3–10.6)
CHLORIDE BLD-SCNC: 98 MMOL/L (ref 99–110)
CHOLESTEROL, TOTAL: 226 MG/DL (ref 0–199)
CO2: 24 MMOL/L (ref 21–32)
CREAT SERPL-MCNC: <0.5 MG/DL (ref 0.6–1.1)
CREATININE URINE: 195.4 MG/DL (ref 28–259)
GFR AFRICAN AMERICAN: >60
GFR NON-AFRICAN AMERICAN: >60
GLOBULIN: 2.9 G/DL
GLUCOSE BLD-MCNC: 153 MG/DL (ref 70–99)
HBA1C MFR BLD: 10.1 %
HDLC SERPL-MCNC: 51 MG/DL (ref 40–60)
LDL CHOLESTEROL CALCULATED: 156 MG/DL
MICROALBUMIN UR-MCNC: 1.6 MG/DL
MICROALBUMIN/CREAT UR-RTO: 8.2 MG/G (ref 0–30)
POTASSIUM SERPL-SCNC: 4.5 MMOL/L (ref 3.5–5.1)
SODIUM BLD-SCNC: 139 MMOL/L (ref 136–145)
TOTAL PROTEIN: 7.9 G/DL (ref 6.4–8.2)
TRIGL SERPL-MCNC: 97 MG/DL (ref 0–150)
VLDLC SERPL CALC-MCNC: 19 MG/DL

## 2019-01-23 PROCEDURE — 83036 HEMOGLOBIN GLYCOSYLATED A1C: CPT | Performed by: INTERNAL MEDICINE

## 2019-01-23 PROCEDURE — 99214 OFFICE O/P EST MOD 30 MIN: CPT | Performed by: INTERNAL MEDICINE

## 2019-01-23 RX ORDER — BLOOD-GLUCOSE METER
EACH MISCELLANEOUS
Qty: 1 KIT | Refills: 0 | Status: SHIPPED | OUTPATIENT
Start: 2019-01-23 | End: 2020-07-19 | Stop reason: SDUPTHER

## 2019-01-24 LAB
ESTIMATED AVERAGE GLUCOSE: 266.1 MG/DL
HBA1C MFR BLD: 10.9 %

## 2019-03-14 ENCOUNTER — OFFICE VISIT (OUTPATIENT)
Dept: ENDOCRINOLOGY | Age: 29
End: 2019-03-14
Payer: COMMERCIAL

## 2019-03-14 VITALS
SYSTOLIC BLOOD PRESSURE: 135 MMHG | HEART RATE: 90 BPM | WEIGHT: 293 LBS | OXYGEN SATURATION: 97 % | HEIGHT: 63 IN | BODY MASS INDEX: 51.91 KG/M2 | DIASTOLIC BLOOD PRESSURE: 81 MMHG

## 2019-03-14 DIAGNOSIS — Z79.4 TYPE 2 DIABETES MELLITUS WITH HYPEROSMOLARITY WITHOUT COMA, WITH LONG-TERM CURRENT USE OF INSULIN (HCC): ICD-10-CM

## 2019-03-14 DIAGNOSIS — E66.01 MORBID OBESITY (HCC): Primary | ICD-10-CM

## 2019-03-14 DIAGNOSIS — E28.2 POLYCYSTIC OVARIAN DISEASE: ICD-10-CM

## 2019-03-14 DIAGNOSIS — E55.9 VITAMIN D DEFICIENCY: ICD-10-CM

## 2019-03-14 DIAGNOSIS — E11.00 TYPE 2 DIABETES MELLITUS WITH HYPEROSMOLARITY WITHOUT COMA, WITH LONG-TERM CURRENT USE OF INSULIN (HCC): ICD-10-CM

## 2019-03-14 PROCEDURE — 99214 OFFICE O/P EST MOD 30 MIN: CPT | Performed by: NURSE PRACTITIONER

## 2019-03-14 RX ORDER — PHENTERMINE HYDROCHLORIDE 37.5 MG/1
37.5 TABLET ORAL
Qty: 30 TABLET | Refills: 0 | Status: SHIPPED | OUTPATIENT
Start: 2019-03-14 | End: 2019-03-14 | Stop reason: CLARIF

## 2019-03-14 ASSESSMENT — ENCOUNTER SYMPTOMS
EYE PAIN: 0
NAUSEA: 0
DIARRHEA: 0
CONSTIPATION: 0
COLOR CHANGE: 0
SHORTNESS OF BREATH: 0
BACK PAIN: 1

## 2019-03-28 DIAGNOSIS — S16.1XXA STRAIN OF STERNOCLEIDOMASTOID MUSCLE, INITIAL ENCOUNTER: ICD-10-CM

## 2019-03-28 DIAGNOSIS — S46.812A STRAIN OF LEFT TRAPEZIUS MUSCLE, INITIAL ENCOUNTER: ICD-10-CM

## 2019-03-28 RX ORDER — CYCLOBENZAPRINE HCL 10 MG
TABLET ORAL
Qty: 45 TABLET | Refills: 0 | Status: SHIPPED | OUTPATIENT
Start: 2019-03-28 | End: 2019-04-01 | Stop reason: SDUPTHER

## 2019-04-01 ENCOUNTER — OFFICE VISIT (OUTPATIENT)
Dept: FAMILY MEDICINE CLINIC | Age: 29
End: 2019-04-01
Payer: COMMERCIAL

## 2019-04-01 VITALS
SYSTOLIC BLOOD PRESSURE: 149 MMHG | TEMPERATURE: 98.2 F | OXYGEN SATURATION: 97 % | HEART RATE: 87 BPM | DIASTOLIC BLOOD PRESSURE: 91 MMHG | WEIGHT: 293 LBS | BODY MASS INDEX: 76.88 KG/M2

## 2019-04-01 DIAGNOSIS — M76.32 ILIOTIBIAL BAND SYNDROME, LEFT LEG: ICD-10-CM

## 2019-04-01 DIAGNOSIS — S39.012A STRAIN OF LUMBAR REGION, INITIAL ENCOUNTER: ICD-10-CM

## 2019-04-01 DIAGNOSIS — Z79.4 TYPE 2 DIABETES MELLITUS WITHOUT COMPLICATION, WITH LONG-TERM CURRENT USE OF INSULIN (HCC): ICD-10-CM

## 2019-04-01 DIAGNOSIS — G57.02 PIRIFORMIS SYNDROME OF LEFT SIDE: ICD-10-CM

## 2019-04-01 DIAGNOSIS — E11.9 TYPE 2 DIABETES MELLITUS WITHOUT COMPLICATION, WITH LONG-TERM CURRENT USE OF INSULIN (HCC): ICD-10-CM

## 2019-04-01 DIAGNOSIS — M70.62 TROCHANTERIC BURSITIS OF LEFT HIP: Primary | ICD-10-CM

## 2019-04-01 PROCEDURE — 99214 OFFICE O/P EST MOD 30 MIN: CPT | Performed by: FAMILY MEDICINE

## 2019-04-01 RX ORDER — TRAMADOL HYDROCHLORIDE 50 MG/1
50 TABLET ORAL EVERY 6 HOURS PRN
Qty: 20 TABLET | Refills: 0 | Status: SHIPPED | OUTPATIENT
Start: 2019-04-01 | End: 2019-04-06

## 2019-04-01 RX ORDER — METHYLPREDNISOLONE 4 MG/1
TABLET ORAL
Qty: 1 KIT | Refills: 0 | Status: SHIPPED | OUTPATIENT
Start: 2019-04-01 | End: 2019-04-07

## 2019-04-01 RX ORDER — CYCLOBENZAPRINE HCL 10 MG
TABLET ORAL
Qty: 45 TABLET | Refills: 0 | Status: SHIPPED | OUTPATIENT
Start: 2019-04-01 | End: 2019-10-06 | Stop reason: SDUPTHER

## 2019-04-01 NOTE — PATIENT INSTRUCTIONS
Patient Education        Hip Bursitis: Exercises  Your Care Instructions  Here are some examples of typical rehabilitation exercises for your condition. Start each exercise slowly. Ease off the exercise if you start to have pain. Your doctor or physical therapist will tell you when you can start these exercises and which ones will work best for you. How to do the exercises  Hip rotator stretch    1. Lie on your back with both knees bent and your feet flat on the floor. 2. Put the ankle of your affected leg on your opposite thigh near your knee. 3. Use your hand to gently push your knee away from your body until you feel a gentle stretch around your hip. 4. Hold the stretch for 15 to 30 seconds. 5. Repeat 2 to 4 times. 6. Repeat steps 1 through 5, but this time use your hand to gently pull your knee toward your opposite shoulder. Iliotibial band stretch    1. Lean sideways against a wall. If you are not steady on your feet, hold on to a chair or counter. 2. Stand on the leg with the affected hip, with that leg close to the wall. Then cross your other leg in front of it. 3. Let your affected hip drop out to the side of your body and against wall. Then lean away from your affected hip until you feel a stretch. 4. Hold the stretch for 15 to 30 seconds. 5. Repeat 2 to 4 times. Straight-leg raises to the outside    1. Lie on your side, with your affected hip on top. 2. Tighten the front thigh muscles of your top leg to keep your knee straight. 3. Keep your hip and your leg straight in line with the rest of your body, and keep your knee pointing forward. Do not drop your hip back. 4. Lift your top leg straight up toward the ceiling, about 12 inches off the floor. Hold for about 6 seconds, then slowly lower your leg. 5. Repeat 8 to 12 times. Clamshell    1. Lie on your side, with your affected hip on top and your head propped on a pillow.  Keep your feet and knees together and your knees bent.  2. Raise your top knee, but keep your feet together. Do not let your hips roll back. Your legs should open up like a clamshell. 3. Hold for 6 seconds. 4. Slowly lower your knee back down. Rest for 10 seconds. 5. Repeat 8 to 12 times. Follow-up care is a key part of your treatment and safety. Be sure to make and go to all appointments, and call your doctor if you are having problems. It's also a good idea to know your test results and keep a list of the medicines you take. Where can you learn more? Go to https://The Black Tuxpepiceweb.ThirstyVIP. org and sign in to your [employer] account. Enter Q537 in the [employer] box to learn more about \"Hip Bursitis: Exercises. \"     If you do not have an account, please click on the \"Sign Up Now\" link. Current as of: September 20, 2018  Content Version: 11.9  © 6687-1522 [employer]. Care instructions adapted under license by [facility] [facility] ([facility]). If you have questions about a medical condition or this instruction, always ask your healthcare professional. [place] [address] any warranty or liability for your use of this information. Patient Education        Iliotibial Band Syndrome: Exercises  Your Care Instructions  Here are some examples of typical rehabilitation exercises for your condition. Start each exercise slowly. Ease off the exercise if you start to have pain. Your doctor or physical therapist will tell you when you can start these exercises and which ones will work best for you. How to do the exercises  Iliotibial band stretch    1. Lean sideways against a wall. If you are not steady on your feet, hold on to a chair or counter. 2. Stand on the leg with the affected hip, with that leg close to the wall. Then cross your other leg in front of it. 3. Let your affected hip drop out to the side of your body and against the wall. Then lean away from your affected hip until you feel a stretch.   4. Hold the stretch for 15 to 30 seconds. 5. Repeat 2 to 4 times. Piriformis stretch    1. Lie on your back with your legs straight. 2. Lift your affected leg and bend your knee. With your opposite hand, reach across your body, and then gently pull your knee toward your opposite shoulder. 3. Hold the stretch for 15 to 30 seconds. 4. Repeat 2 to 4 times. Hamstring wall stretch    1. Lie on your back in a doorway, with your good leg through the open door. 2. Slide your affected leg up the wall to straighten your knee. You should feel a gentle stretch down the back of your leg. 1. Do not arch your back. 2. Do not bend either knee. 3. Keep one heel touching the floor and the other heel touching the wall. Do not point your toes. 3. Hold the stretch for at least 1 minute to begin. Then try to lengthen the time you hold the stretch to as long as 6 minutes. 4. Repeat 2 to 4 times. 5. If you do not have a place to do this exercise in a doorway, there is another way to do it:  6. Lie on your back, and bend the knee of your affected leg. 7. Loop a towel under the ball and toes of that foot, and hold the ends of the towel in your hands. 8. Straighten your knee, and slowly pull back on the towel. You should feel a gentle stretch down the back of your leg. 9. Hold the stretch for 15 to 30 seconds. Or even better, hold the stretch for 1 minute if you can. 10. Repeat 2 to 4 times. Follow-up care is a key part of your treatment and safety. Be sure to make and go to all appointments, and call your doctor if you are having problems. It's also a good idea to know your test results and keep a list of the medicines you take. Where can you learn more? Go to https://Sec[employer] Data SolutionspeCooking.comeb.DataProm. org and sign in to your [employer]ikdate account. Enter P252 in the [employer] box to learn more about \"Iliotibial Band Syndrome: Exercises. \"     If you do not have an account, please click on the \"Sign Up Now\" link.   Current as of: September 20, 2018  Content Version: 11.9  © 1854-1561 [employer]. Care instructions adapted under license by [facility] ([facility]). If you have questions about a medical condition or this instruction, always ask your healthcare professional. [address] any warranty or liability for your use of this information. Patient Education        Trochanteric Bursitis: Exercises  Your Care Instructions  Here are some examples of typical rehabilitation exercises for your condition. Start each exercise slowly. Ease off the exercise if you start to have pain. Your doctor or physical therapist will tell you when you can start these exercises and which ones will work best for you. How to do the exercises  Hamstring wall stretch    1. Lie on your back in a doorway, with your good leg through the open door. 2. Slide your affected leg up the wall to straighten your knee. You should feel a gentle stretch down the back of your leg. 1. Do not arch your back. 2. Do not bend either knee. 3. Keep one heel touching the floor and the other heel touching the wall. Do not point your toes. 3. Hold the stretch for at least 1 minute to begin. Then try to lengthen the time you hold the stretch to as long as 6 minutes. 4. Repeat 2 to 4 times. 5. If you do not have a place to do this exercise in a doorway, there is another way to do it:  6. Lie on your back, and bend the knee of your affected leg. 7. Loop a towel under the ball and toes of that foot, and hold the ends of the towel in your hands. 8. Straighten your knee, and slowly pull back on the towel. You should feel a gentle stretch down the back of your leg. 9. Hold the stretch for 15 to 30 seconds. Or even better, hold the stretch for 1 minute if you can. 10. Repeat 2 to 4 times. Straight-leg raises to the outside    1. Lie on your side, with your affected leg on top.   2. Tighten the front thigh muscles of your top leg to keep your knee straight. 3. Keep your hip and your leg straight in line with the rest of your body, and keep your knee pointing forward. Do not drop your hip back. 4. Lift your top leg straight up toward the ceiling, about 12 inches off the floor. Hold for about 6 seconds, then slowly lower your leg. 5. Repeat 8 to 12 times. Clamshell    1. Lie on your side, with your affected leg on top and your head propped on a pillow. Keep your feet and knees together and your knees bent. 2. Raise your top knee, but keep your feet together. Do not let your hips roll back. Your legs should open up like a clamshell. 3. Hold for 6 seconds. 4. Slowly lower your knee back down. Rest for 10 seconds. 5. Repeat 8 to 12 times. Standing quadriceps stretch    1. If you are not steady on your feet, hold on to a chair, counter, or wall. You can also lie on your stomach or your side to do this exercise. 2. Bend the knee of the leg you want to stretch, and reach behind you to grab the front of your foot or ankle with the hand on the same side. For example, if you are stretching your right leg, use your right hand. 3. Keeping your knees next to each other, pull your foot toward your buttock until you feel a gentle stretch across the front of your hip and down the front of your thigh. Your knee should be pointed directly to the ground, and not out to the side. 4. Hold the stretch for 15 to 30 seconds. 5. Repeat 2 to 4 times. Piriformis stretch    1. Lie on your back with your legs straight. 2. Lift your affected leg and bend your knee. With your opposite hand, reach across your body, and then gently pull your knee toward your opposite shoulder. 3. Hold the stretch for 15 to 30 seconds. 4. Repeat 2 to 4 times. Double knee-to-chest    1. Lie on your back with your knees bent and your feet flat on the floor. You can put a small pillow under your head and neck if it is more comfortable.   2. Bring both knees to your chest.  3. Keep your one leg off the floor, and hold it straight out behind you. Be careful not to let your hip drop down, because that will twist your trunk. 4. Hold for about 6 seconds, then lower your leg and switch to the other leg. 5. Repeat 8 to 12 times on each leg. 6. Over time, work up to holding for 10 to 30 seconds each time. 7. If you feel stable and secure with your leg raised, try raising the opposite arm straight out in front of you at the same time. Knee-to-chest exercise    1. Lie on your back with your knees bent and your feet flat on the floor. 2. Bring one knee to your chest, keeping the other foot flat on the floor (or keeping the other leg straight, whichever feels better on your lower back). 3. Keep your lower back pressed to the floor. Hold for at least 15 to 30 seconds. 4. Relax, and lower the knee to the starting position. 5. Repeat with the other leg. Repeat 2 to 4 times with each leg. 6. To get more stretch, put your other leg flat on the floor while pulling your knee to your chest.    Curl-ups    1. Lie on the floor on your back with your knees bent at a 90-degree angle. Your feet should be flat on the floor, about 12 inches from your buttocks. 2. Cross your arms over your chest. If this bothers your neck, try putting your hands behind your neck (not your head), with your elbows spread apart. 3. Slowly tighten your belly muscles and raise your shoulder blades off the floor. 4. Keep your head in line with your body, and do not press your chin to your chest.  5. Hold this position for 1 or 2 seconds, then slowly lower yourself back down to the floor. 6. Repeat 8 to 12 times. Pelvic tilt exercise    1. Lie on your back with your knees bent. 2. \"Brace\" your stomach. This means to tighten your muscles by pulling in and imagining your belly button moving toward your spine. You should feel like your back is pressing to the floor and your hips and pelvis are rocking back.   3. Hold for about 6 seconds while you breathe smoothly. 4. Repeat 8 to 12 times. Heel dig bridging    1. Lie on your back with both knees bent and your ankles bent so that only your heels are digging into the floor. Your knees should be bent about 90 degrees. 2. Then push your heels into the floor, squeeze your buttocks, and lift your hips off the floor until your shoulders, hips, and knees are all in a straight line. 3. Hold for about 6 seconds as you continue to breathe normally, and then slowly lower your hips back down to the floor and rest for up to 10 seconds. 4. Do 8 to 12 repetitions. Hamstring stretch in doorway    1. Lie on your back in a doorway, with one leg through the open door. 2. Slide your leg up the wall to straighten your knee. You should feel a gentle stretch down the back of your leg. 3. Hold the stretch for at least 15 to 30 seconds. Do not arch your back, point your toes, or bend either knee. Keep one heel touching the floor and the other heel touching the wall. 4. Repeat with your other leg. 5. Do 2 to 4 times for each leg. Hip flexor stretch    1. Kneel on the floor with one knee bent and one leg behind you. Place your forward knee over your foot. Keep your other knee touching the floor. 2. Slowly push your hips forward until you feel a stretch in the upper thigh of your rear leg. 3. Hold the stretch for at least 15 to 30 seconds. Repeat with your other leg. 4. Do 2 to 4 times on each side. Wall sit    1. Stand with your back 10 to 12 inches away from a wall. 2. Lean into the wall until your back is flat against it. 3. Slowly slide down until your knees are slightly bent, pressing your lower back into the wall. 4. Hold for about 6 seconds, then slide back up the wall. 5. Repeat 8 to 12 times. Follow-up care is a key part of your treatment and safety. Be sure to make and go to all appointments, and call your doctor if you are having problems.  It's also a good idea to know your test results and keep a list of the medicines you take. Where can you learn more? Go to https://chpepiceweb.LeisureLogix. org and sign in to your [employer] account. Enter F794 in the [facility] box to learn more about \"Low Back Pain: Exercises. \"     If you do not have an account, please click on the \"Sign Up Now\" link. Current as of: September 20, 2018  Content Version: 11.9  © 3209-4522 [employer]. Care instructions adapted under license by [facility] ([facility]). If you have questions about a medical condition or this instruction, always ask your healthcare professional. [address] any warranty or liability for your use of this information.

## 2019-04-01 NOTE — PROGRESS NOTES
SUBJECTIVE:  33 y.o. Jerardo Valenzuela female is here for left hip pain and follow up of diabetes . Diabetic Review of Systems - medication compliance: noncompliant some of the time, diabetic diet compliance: noncompliant some of the time, home glucose monitoring: fasting values range 04=[id number], further diabetic ROS: no polyuria or polydipsia, no chest pain, dyspnea or TIA's, no numbness, tingling or pain in extremities, last eye exam approximately 1 year ago , but has follow up 4/29/19. She was doing well in 2/19 until 2 weeks ago with compliance, including check Blood sugars , taking medications and diet. Her Blood sugars were better with decreased carbs. HgbA1c= 10.9 1/19 . She is seeing Dr. Monisha Hi. She takes Humlog 15-25 U . Weekly Trulicity . Nutritionist decreased from 8 -18 U and trying to follow a low carb diet. If her Blood sugars is elevated after 3 days , she increases Toujeo by 1 Unit. Toujeo is 140 U per day. Left hip and buttock pain started 3 weeks ago . No trauma. Using heat and heating pad. Pain with sitting , walking, driving. She has been doing Garret for the past 1.5 years. She had been doing it 2d/ week since 7/19 . She backed off it the past 3 weeks due to injury . Current Outpatient Medications   Medication Sig Dispense Refill    cyclobenzaprine (FLEXERIL) 10 MG tablet Take 1 po q 8 hours prn neck pain/ tension headaches. 45 tablet 0    insulin lispro (HUMALOG KWIKPEN) 100 UNIT/ML pen 15 units AC TID 5 pen 3    Blood Glucose Monitoring Suppl (ONE TOUCH ULTRA 2) w/Device KIT 4 times a day 1 kit 0    blood glucose test strips (ONE TOUCH ULTRA TEST) strip 4 times daily.  150 strip 6    Insulin Pen Needle (B-D UF III MINI PEN NEEDLES) 31G X 5 MM MISC 4 times a day 150 each 6    Norgestim-Eth Estrad Triphasic (TRI-PREVIFEM) 0.18/0.215/0.25 MG-35 MCG TABS TAKE 1 TABLET BY MOUTH ONCE DAILY 28 tablet 5    montelukast (SINGULAIR) 10 MG tablet Take 1 tablet by mouth nightly 30 tablet 5    Dulaglutide (TRULICITY) 1.5 RA/4.9PG SOPN Inject 1.5 mg weekly 4 pen 5    insulin glargine (TOUJEO SOLOSTAR) 300 UNIT/ML injection pen Inject 140 U nightly. 12 pen 5    promethazine (PHENERGAN) 25 MG tablet Take 1 tablet by mouth every 8 hours as needed for Nausea (headache) 40 tablet 0    topiramate (TOPAMAX) 100 MG tablet Take 1 tablet by mouth nightly 30 tablet 5    albuterol sulfate HFA (VENTOLIN HFA) 108 (90 Base) MCG/ACT inhaler Inhale 2 puffs into the lungs every 6 hours as needed for Wheezing 3 Inhaler 3    Glucose Blood (BLOOD GLUCOSE TEST STRIPS) STRP Check Blood sugars qac and qhs. 100 strip 5    ondansetron (ZOFRAN) 8 MG tablet Take 1 tablet by mouth every 8 hours as needed for Nausea 60 tablet 1    Lancets MISC Check Blood sugars qac and qhs. 100 each 5    Glucose Blood (BLOOD GLUCOSE TEST STRIPS) STRP Check Blood sugars qac and qhs. 100 strip 5     No current facility-administered medications for this visit. ROS: All other systems were reviewed and were negative . Patient's allergies and medications were reviewed. Patient's past medical, surgical, social , and family history were reviewed. OBJECTIVE:  BP (!) 149/91   Pulse 87   Temp 98.2 °F (36.8 °C) (Oral)   Wt (!) 434 lb (196.9 kg)   LMP 03/15/2019   SpO2 97%   BMI 76.88 kg/m²   General: NAD, cooperative, alert and oriented X 3, affect / mood is good. Good insight. well hydrated. Neck : no lymphadenopathy, supple, FROM  CV: Regular rate and rhythm , no murmurs/ rub/ gallop. No edema. Lungs : CTA bilaterally, breathing comfortably  Abdomen: positive bowel sounds, soft , non tender, non distended. No hepatosplenomegaly. Back: mild left lower abdomen tenderness. Pain with rotation. Lower extremities : DTRs 2+ knees and ankles bilateral. Tight hamstrings bilateral. Strength 5/5. Negative straight leg-raise. No edema or erythema bilateral.   LLE: tenderness to left lateral hip- inferior to greater trochanter.  No edema or 0  - traMADol (ULTRAM) 50 MG tablet; Take 1 tablet by mouth every 6 hours as needed for Pain for up to 5 days. Intended supply: 5 days. Take lowest dose possible to manage pain  Dispense: 20 tablet; Refill: 0    4. Piriformis syndrome of left side  - Moist heat . ROM exercises. Modify activities. - methylPREDNISolone (MEDROL DOSEPACK) 4 MG tablet; Take by mouth. Dispense: 1 kit; Refill: 0  - traMADol (ULTRAM) 50 MG tablet; Take 1 tablet by mouth every 6 hours as needed for Pain for up to 5 days. Intended supply: 5 days. Take lowest dose possible to manage pain  Dispense: 20 tablet; Refill: 0    5. Type 2 diabetes mellitus without complication, with long-term current use of insulin ([address]  - followed by Endocrinologist.   - continue Toujeo 871 U qhs, Trulicity 1.5 weekly and Humalog SSI with carbsI. PLAN:  See orders for this visit as documented in the electronic medical record. Issues reviewed with her: low cholesterol diet, weight control and daily exercise discussed, home glucose monitoring emphasized, all medications, side effects and compliance discussed carefully, foot care discussed and Podiatry visits discussed, annual eye examinations at Ophthalmology discussed, glycohemoglobin and other lab monitoring discussed and long term diabetic complications discussed. Goals:   1) Blood pressure < 130/80  2) HGA1C ideal less than 6.5, at least less than 7.0  3) LDL cholesterol < 100  4) Exercise 150 minutes a week   5) Dilated eye exam by an optometrist or ophthalmologist once a year  6) Fasting blood sugar < 110  7) Glucose 2 hours after meal < 140  8) Aspirin 81 mg once a day  9) BMI (Body Mass Index) ideal < 25, at least less than 30. Weight loss of even 10 to 15 pounds is effective in improving diabetes  9) Total fat intake to less than 60 grams per day and saturated fat to less than 20 grams per day. 10) Diabetic education and diabetic nutrition classes.   [phone]) Doctor visits every 3

## 2019-04-23 ENCOUNTER — TELEPHONE (OUTPATIENT)
Dept: PAIN MANAGEMENT | Age: 29
End: 2019-04-23

## 2019-04-29 ENCOUNTER — OFFICE VISIT (OUTPATIENT)
Dept: ENDOCRINOLOGY | Age: 29
End: 2019-04-29
Payer: COMMERCIAL

## 2019-04-29 VITALS
BODY MASS INDEX: 50.02 KG/M2 | SYSTOLIC BLOOD PRESSURE: 138 MMHG | WEIGHT: 293 LBS | HEART RATE: 84 BPM | OXYGEN SATURATION: 97 % | RESPIRATION RATE: 16 BRPM | DIASTOLIC BLOOD PRESSURE: 95 MMHG | HEIGHT: 64 IN

## 2019-04-29 DIAGNOSIS — E66.01 CLASS 3 SEVERE OBESITY WITH BODY MASS INDEX (BMI) GREATER THAN OR EQUAL TO 70 IN ADULT, UNSPECIFIED OBESITY TYPE, UNSPECIFIED WHETHER SERIOUS COMORBIDITY PRESENT (HCC): ICD-10-CM

## 2019-04-29 DIAGNOSIS — Z79.4 TYPE 2 DIABETES MELLITUS WITH HYPEROSMOLARITY WITHOUT COMA, WITH LONG-TERM CURRENT USE OF INSULIN (HCC): ICD-10-CM

## 2019-04-29 DIAGNOSIS — E11.00 TYPE 2 DIABETES MELLITUS WITH HYPEROSMOLARITY WITHOUT COMA, WITH LONG-TERM CURRENT USE OF INSULIN (HCC): Primary | ICD-10-CM

## 2019-04-29 DIAGNOSIS — Z79.4 TYPE 2 DIABETES MELLITUS WITH HYPEROSMOLARITY WITHOUT COMA, WITH LONG-TERM CURRENT USE OF INSULIN (HCC): Primary | ICD-10-CM

## 2019-04-29 DIAGNOSIS — E55.9 VITAMIN D DEFICIENCY: ICD-10-CM

## 2019-04-29 DIAGNOSIS — E11.00 TYPE 2 DIABETES MELLITUS WITH HYPEROSMOLARITY WITHOUT COMA, WITH LONG-TERM CURRENT USE OF INSULIN (HCC): ICD-10-CM

## 2019-04-29 DIAGNOSIS — E78.49 OTHER HYPERLIPIDEMIA: ICD-10-CM

## 2019-04-29 LAB
DIABETIC RETINOPATHY: NEGATIVE
HBA1C MFR BLD: 9.4 %

## 2019-04-29 PROCEDURE — 99214 OFFICE O/P EST MOD 30 MIN: CPT | Performed by: INTERNAL MEDICINE

## 2019-04-29 PROCEDURE — 83036 HEMOGLOBIN GLYCOSYLATED A1C: CPT | Performed by: INTERNAL MEDICINE

## 2019-04-29 NOTE — PROGRESS NOTES
Seen as f/u patient for diabetes    Interim; Glucose better  Taking less prandial dose    Diagnosed with Type 2 diabetes mellitus at age 16 years  Known diabetic complications: none   Uncontrolled  Insulin started a few year after diagnosis  On metformin initially at time of diagnosis    Current diabetic medications     Toujeo 984 units   Trulicity  Novolog 15 units AC TID  But taking 10   SSI 2 for 50 >150    Did not tolerate metformin    Last A1c 9.4%<-----10.1%<------- 11.7 on 7/18 <--- 11.3 <--- 10.9    Prior visit with dietician: Yes   Current diet: on average, 3 meals per day   Current exercise: walking   Current monitoring regimen: home blood tests - not checking for a month    Has brought blood glucose log/meter: No   Home blood sugar records: 160-275  Any episodes of hypoglycemia? --    No Hx of CAD , PVD, CVA    Hyperlipidemia:   Not on medication   on 7/18    Last eye exam: 3/18  Last foot exam: 1/19  Last microalbumin to creatinine ratio: 1/19    She has a h/o PCOS, on OCP    H/o obesity. Has been gaining weight.  She is on low CHO diet  Does reports ate more, increased appetite    SH: Mom is our clinic patient, maureen carrillo    Past Medical History:   Diagnosis Date    Acne     Allergic rhinitis     Asthma     Cellulitis     on back/ on bactrim for/ pcp aware    Diabetes mellitus type 1 ([address] 7/93    Folic acid deficiency     Hyperlipidemia     Obesity     TAY (obstructive sleep apnea) 7/07    PCOS (polycystic ovarian syndrome)     Pyelonephritis 12/08    left    Vitamin D deficiency      Past Surgical History:   Procedure Laterality Date    CHOLECYSTECTOMY  1/15/2013    MULTIPORT ROBOTIC ASSISTED LAPAROSCOPIC CHOLECYSTECTOMY    OTHER SURGICAL HISTORY  2009    Cyst/Mass excised from posterior neck    OTHER SURGICAL HISTORY  12/15    excision back lesion-Dr. Kishore Odom    WISDOM TOOTH EXTRACTION       Current Outpatient Medications   Medication Sig Dispense Refill    cyclobenzaprine (FLEXERIL) 10 MG tablet Take 1 po q 8 hours prn low back pain/ tension headaches. 45 tablet 0    insulin lispro (HUMALOG KWIKPEN) 100 UNIT/ML pen 15 units AC TID 5 pen 3    Blood Glucose Monitoring Suppl (ONE TOUCH ULTRA 2) w/Device KIT 4 times a day 1 kit 0    blood glucose test strips (ONE TOUCH ULTRA TEST) strip 4 times daily. 150 strip 6    Insulin Pen Needle (B-D UF III MINI PEN NEEDLES) 31G X 5 MM MISC 4 times a day 150 each 6    Norgestim-Eth Estrad Triphasic (TRI-PREVIFEM) 0.18/0.215/0.25 MG-35 MCG TABS TAKE 1 TABLET BY MOUTH ONCE DAILY 28 tablet 5    montelukast (SINGULAIR) 10 MG tablet Take 1 tablet by mouth nightly 30 tablet 5    Dulaglutide (TRULICITY) 1.5 XD/6.8JM SOPN Inject 1.5 mg weekly 4 pen 5    insulin glargine (TOUJEO SOLOSTAR) 300 UNIT/ML injection pen Inject 140 U nightly. 12 pen 5    promethazine (PHENERGAN) 25 MG tablet Take 1 tablet by mouth every 8 hours as needed for Nausea (headache) 40 tablet 0    topiramate (TOPAMAX) 100 MG tablet Take 1 tablet by mouth nightly 30 tablet 5    albuterol sulfate HFA (VENTOLIN HFA) 108 (90 Base) MCG/ACT inhaler Inhale 2 puffs into the lungs every 6 hours as needed for Wheezing 3 Inhaler 3    Glucose Blood (BLOOD GLUCOSE TEST STRIPS) STRP Check Blood sugars qac and qhs. 100 strip 5    ondansetron (ZOFRAN) 8 MG tablet Take 1 tablet by mouth every 8 hours as needed for Nausea 60 tablet 1    Lancets MISC Check Blood sugars qac and qhs. 100 each 5    Glucose Blood (BLOOD GLUCOSE TEST STRIPS) STRP Check Blood sugars qac and qhs. 100 strip 5     No current facility-administered medications for this visit. Review of Systems  Please see scanned document dated and signed      Objective:      BP (!) 166/98 (Site: Left Lower Arm, Position: Sitting, Cuff Size: Large Adult)   Pulse 84   Resp 16   Ht 5' 3.5\" (1.613 m)   Wt (!) 432 lb 6.4 oz (196.1 kg)   LMP 04/28/2019   SpO2 97%   Breastfeeding?  No   BMI 75.39 kg/m²   Wt Readings from Last 3 Encounters:   04/29/19 (!) 432 lb 6.4 oz (196.1 kg)   04/01/19 (!) 434 lb (196.9 kg)   03/14/19 (!) 432 lb 6.4 oz (196.1 kg)     Constitutional: Well-developed, appears stated age, cooperative, in no acute distress  H/E/N/M/T:atraumatic, normocephalic, external ears, nose, lips normal without lesions  Eyes: Lids, lashes, conjunctivae and sclerae normal, No proptosis, no redness  Neck: supple, symmetrical, no swelling  Skin: No obvious rashes or lesions present. Skin and hair texture normal  Psychiatric: Judgement and Insight:  judgement and insight appear normal  Neuro: Normal without focal findings, speech is normal normal, speech is spontaneous  Chest: No labored breathing, no chest deformity, no stridor  Musculoskeletal: No joint deformity, swelling      Lab Reviewed   No components found for: CHLPL  Lab Results   Component Value Date    TRIG 97 01/23/2019    TRIG 98 07/06/2018    TRIG 170 (H) 12/29/2017     Lab Results   Component Value Date    HDL 51 01/23/2019    HDL 50 07/06/2018    HDL 48 12/29/2017     Lab Results   Component Value Date    LDLCALC 156 (H) 01/23/2019    LDLCALC 129 (H) 07/06/2018    LDLCALC 147 (H) 12/29/2017     Lab Results   Component Value Date    LABVLDL 19 01/23/2019    LABVLDL 20 07/06/2018    LABVLDL 34 12/29/2017     Lab Results   Component Value Date    LABA1C 10.9 01/23/2019       Assessment:     Elisa Hamm is a 33 y.o. female with :    1.T2DM: Longstanding, uncontrolled, insulin resistant. On high dose of insulin. Discussed weight loss, diet changes, she is on GLP-1 agonist. Increase prandial insulin. May need U-500  2. HLD : LDL above target, recommend statin  3. Obesity: Discussed weight loss, advise 1500 Kcal diet. Discussed bariatric surgery, not interested, will refer  4. PCOS: On OCP     Plan: Toujeo 140 units   Novolog 15 units AC TID   SSI 2 for 50 >150   Advise to check blood sugar 4 times a day   Patient to send blood sugar log for titration.    Advise to exercise regularly. Advise to low simple carbohydrate and protein with each  meal diet. Diabetes Care: recommend yearly eye exam, foot exam and urine microalbumin to   creatinine ratio. Patient is up-to-date.

## 2019-04-30 ENCOUNTER — TELEPHONE (OUTPATIENT)
Dept: ENDOCRINOLOGY | Age: 29
End: 2019-04-30

## 2019-04-30 LAB — VITAMIN D 25-HYDROXY: 7 NG/ML

## 2019-04-30 RX ORDER — ERGOCALCIFEROL (VITAMIN D2) 1250 MCG
50000 CAPSULE ORAL
Qty: 12 CAPSULE | Refills: 3 | Status: SHIPPED | OUTPATIENT
Start: 2019-05-02 | End: 2020-07-19 | Stop reason: SDUPTHER

## 2019-04-30 NOTE — TELEPHONE ENCOUNTER
..Called patient to give lab results, per HIP[employer] left message      Reviewed results. And rx sent for Vitamin D @ 46972 IU Qweekly.

## 2019-05-01 LAB
C-PEPTIDE: 2.7 NG/ML (ref 1.1–4.4)
GLUTAMIC ACID DECARB AB: <5 IU/ML (ref 0–5)

## 2019-10-06 DIAGNOSIS — S39.012A STRAIN OF LUMBAR REGION, INITIAL ENCOUNTER: ICD-10-CM

## 2019-10-06 DIAGNOSIS — E10.9 TYPE 1 DIABETES MELLITUS WITHOUT COMPLICATION, WITH LONG-TERM CURRENT USE OF INSULIN (HCC): ICD-10-CM

## 2019-10-06 DIAGNOSIS — E28.2 POLYCYSTIC OVARIAN DISEASE: ICD-10-CM

## 2019-10-08 ENCOUNTER — TELEPHONE (OUTPATIENT)
Dept: FAMILY MEDICINE CLINIC | Age: 29
End: 2019-10-08

## 2019-10-08 RX ORDER — CYCLOBENZAPRINE HCL 10 MG
TABLET ORAL
Qty: 45 TABLET | Refills: 0 | Status: SHIPPED | OUTPATIENT
Start: 2019-10-08 | End: 2020-09-25 | Stop reason: SDUPTHER

## 2019-10-08 RX ORDER — ALBUTEROL SULFATE 90 UG/1
2 AEROSOL, METERED RESPIRATORY (INHALATION) EVERY 6 HOURS PRN
Qty: 3 INHALER | Refills: 0 | Status: SHIPPED | OUTPATIENT
Start: 2019-10-08 | End: 2020-07-20 | Stop reason: SDUPTHER

## 2019-10-08 RX ORDER — NORGESTIMATE AND ETHINYL ESTRADIOL 7DAYSX3 28
KIT ORAL
Qty: 28 TABLET | Refills: 5 | Status: SHIPPED | OUTPATIENT
Start: 2019-10-08 | End: 2020-07-19 | Stop reason: SDUPTHER

## 2020-07-20 RX ORDER — PEN NEEDLE, DIABETIC 31 GX5/16"
NEEDLE, DISPOSABLE MISCELLANEOUS
Qty: 150 EACH | Refills: 6 | Status: SHIPPED | OUTPATIENT
Start: 2020-07-20

## 2020-07-20 RX ORDER — DULAGLUTIDE 1.5 MG/.5ML
INJECTION, SOLUTION SUBCUTANEOUS
Qty: 4 PEN | Refills: 0 | Status: SHIPPED | OUTPATIENT
Start: 2020-07-20 | End: 2020-08-19 | Stop reason: SDUPTHER

## 2020-07-20 RX ORDER — ERGOCALCIFEROL (VITAMIN D2) 1250 MCG
50000 CAPSULE ORAL
Qty: 12 CAPSULE | Refills: 3 | Status: SHIPPED | OUTPATIENT
Start: 2020-07-20 | End: 2022-01-24

## 2020-07-20 RX ORDER — NORGESTIMATE AND ETHINYL ESTRADIOL 7DAYSX3 28
KIT ORAL
Qty: 28 TABLET | Refills: 0 | Status: SHIPPED | OUTPATIENT
Start: 2020-07-20 | End: 2020-09-15

## 2020-07-20 RX ORDER — BLOOD-GLUCOSE METER
EACH MISCELLANEOUS
Qty: 1 KIT | Refills: 0 | Status: SHIPPED | OUTPATIENT
Start: 2020-07-20 | End: 2021-11-30

## 2020-07-20 NOTE — TELEPHONE ENCOUNTER
Last OV 4/1/19  F/U not found  Last labs 1/23/19  Please advise  Thank you      Heng[employer]hit message sent re: labs and appt.

## 2020-07-21 ENCOUNTER — TELEPHONE (OUTPATIENT)
Dept: FAMILY MEDICINE CLINIC | Age: 30
End: 2020-07-21

## 2020-07-21 RX ORDER — ALBUTEROL SULFATE 90 UG/1
2 AEROSOL, METERED RESPIRATORY (INHALATION) EVERY 6 HOURS PRN
Qty: 1 INHALER | Refills: 0 | Status: SHIPPED | OUTPATIENT
Start: 2020-07-21 | End: 2021-07-28

## 2020-07-21 RX ORDER — CYCLOBENZAPRINE HCL 10 MG
TABLET ORAL
Qty: 30 TABLET | Refills: 0 | OUTPATIENT
Start: 2020-07-21

## 2020-07-21 NOTE — TELEPHONE ENCOUNTER
Last OV 4/1/19  F/U not found   [employer] message read re: appointment needed.    Please advise  Thank you

## 2020-07-22 NOTE — TELEPHONE ENCOUNTER
Albuterol RX sent. Please facilitate scheduling a follow up appointment . (.Diabetic /overdue for appointment and labs). Flexeril refill request was refused.

## 2020-07-22 NOTE — TELEPHONE ENCOUNTER
PA submitted via CMM for Trulicity 0.4EK/2.7YL pen-injectors. (Key: Cristal Young) Rx #: [id number]    APPROVED today per [facility]. Coverage Starts on: 7/22/2020 12:00:00 AM  Coverage Ends on: 7/22/2021 12:00:00 AM    Please advise patient. Thank you.

## 2020-08-10 RX ORDER — INSULIN GLARGINE 300 U/ML
INJECTION, SOLUTION SUBCUTANEOUS
Qty: 18 ML | Refills: 0 | OUTPATIENT
Start: 2020-08-10

## 2020-08-13 ENCOUNTER — TELEPHONE (OUTPATIENT)
Dept: ENDOCRINOLOGY | Age: 30
End: 2020-08-13

## 2020-08-13 RX ORDER — INSULIN ASPART 100 [IU]/ML
INJECTION, SOLUTION INTRAVENOUS; SUBCUTANEOUS
Qty: 2 PEN | Refills: 0 | Status: SHIPPED | OUTPATIENT
Start: 2020-08-13 | End: 2020-08-19

## 2020-08-13 NOTE — TELEPHONE ENCOUNTER
LOV 4/2019 NOV 9/23/2020    Novolog 15 units AC TID, per last office note    Rx is requesting Humalog be sent to [facility] [address]

## 2020-08-13 NOTE — TELEPHONE ENCOUNTER
Sent 2 pens only as not seen for > 1 year. Can be overbooked on any Wednesday 12 pm if willing to be seen sooner.

## 2020-08-19 ENCOUNTER — OFFICE VISIT (OUTPATIENT)
Dept: ENDOCRINOLOGY | Age: 30
End: 2020-08-19
Payer: COMMERCIAL

## 2020-08-19 VITALS
WEIGHT: 293 LBS | DIASTOLIC BLOOD PRESSURE: 69 MMHG | BODY MASS INDEX: 51.91 KG/M2 | HEIGHT: 63 IN | SYSTOLIC BLOOD PRESSURE: 112 MMHG | HEART RATE: 79 BPM

## 2020-08-19 LAB — HBA1C MFR BLD: 9.6 %

## 2020-08-19 PROCEDURE — 83036 HEMOGLOBIN GLYCOSYLATED A1C: CPT | Performed by: INTERNAL MEDICINE

## 2020-08-19 PROCEDURE — 99214 OFFICE O/P EST MOD 30 MIN: CPT | Performed by: INTERNAL MEDICINE

## 2020-08-19 RX ORDER — INSULIN GLARGINE 300 U/ML
INJECTION, SOLUTION SUBCUTANEOUS
Qty: 12 PEN | Refills: 2 | Status: SHIPPED | OUTPATIENT
Start: 2020-08-19 | End: 2020-10-27

## 2020-08-19 RX ORDER — INSULIN LISPRO 100 [IU]/ML
INJECTION, SOLUTION INTRAVENOUS; SUBCUTANEOUS
Qty: 15 ML | Refills: 2 | Status: SHIPPED | OUTPATIENT
Start: 2020-08-19 | End: 2021-05-06

## 2020-08-19 RX ORDER — DULAGLUTIDE 1.5 MG/.5ML
INJECTION, SOLUTION SUBCUTANEOUS
Qty: 4 PEN | Refills: 0 | Status: SHIPPED | OUTPATIENT
Start: 2020-08-19 | End: 2020-09-16

## 2020-08-19 NOTE — TELEPHONE ENCOUNTER
Medication:   Requested Prescriptions     Pending Prescriptions Disp Refills    Dulaglutide (TRULICITY) 1.5 DH/5.2KD SOPN 4 pen 0     Sig: Inject 1.5 mg weekly         Last appt: 8/19/2020   Next appt: 11/25/2020    Last OARRS:   RX Monitoring 11/15/2017   Attestation The Prescription Monitoring Report for this patient was reviewed today. Periodic Controlled Substance Monitoring Possible medication side effects, risk of tolerance and/or dependence, and alternative treatments discussed. ;No signs of potential drug abuse or diversion identified.

## 2020-08-19 NOTE — PROGRESS NOTES
Seen as f/u patient for diabetes    Interim;   Seen after 4/19  Glucose better in last 3 weeks  She is more adherent with humalog  Eating better    Diagnosed with Type 2 diabetes mellitus at age 16 years  Known diabetic complications: none   Uncontrolled  Insulin started a few year after diagnosis  On metformin initially at time of diagnosis    Current diabetic medications     Toujeo 152 units   Trulicity  Novolog 15 units AC TID     SSI 2 for 50 >150    Did not tolerate metformin    Last A1c 9.6%<-----9.4%<-----10.1%<------- 11.7 on 7/18 <--- 11.3 <--- 10.9    Prior visit with dietician: Yes   Current diet: on average, 3 meals per day   Current exercise: walking   Current monitoring regimen: home blood tests - not checking for a month    Has brought blood glucose log/meter: yes   Home blood sugar records:   Any episodes of hypoglycemia? --    No Hx of CAD , PVD, CVA    Hyperlipidemia:   Not on medication  uncontrolled   on 7/18   on 1/19    Last eye exam: 3/18  Last foot exam: 1/19  Last microalbumin to creatinine ratio: 1/19    She has a h/o PCOS, on OCP    H/o obesity. Has been gaining weight.  She is on low CHO diet  Does reports ate more, increased appetite    She has vit D deficiency  Last 7 on 4.19    Takes vit D 50,000 IU twice a week    SH: Mom is our clinic patient, maureen carrillo    Past Medical History:   Diagnosis Date    Acne     Allergic rhinitis     Asthma     Cellulitis     on back/ on bactrim for/ pcp aware    Diabetes mellitus type 1 ([address] 4/29    Folic acid deficiency     Hyperlipidemia     Obesity     TAY (obstructive sleep apnea) 7/07    PCOS (polycystic ovarian syndrome)     Pyelonephritis 12/08    left    Vitamin D deficiency      Past Surgical History:   Procedure Laterality Date    CHOLECYSTECTOMY  1/15/2013    MULTIPORT ROBOTIC ASSISTED LAPAROSCOPIC CHOLECYSTECTOMY    OTHER SURGICAL HISTORY  2009    Cyst/Mass excised from posterior neck    OTHER SURGICAL HISTORY  12/15    excision back lesion-Dr. Constantino Scarce    WISDOM TOOTH EXTRACTION       Current Outpatient Medications   Medication Sig Dispense Refill    insulin lispro, 1 Unit Dial, (HUMALOG KWIKPEN) 100 UNIT/ML SOPN INJECT 15 UNITS SUBCUTANEOUSLY BEFORE MEALS 3 TIMES DAILY 3 mL 0    albuterol sulfate HFA (VENTOLIN HFA) 108 (90 Base) MCG/ACT inhaler Inhale 2 puffs into the lungs every 6 hours as needed for Wheezing 1 Inhaler 0    Norgestim-Eth Estrad Triphasic (TRI-PREVIFEM) 0.18/0.215/0.25 MG-35 MCG TABS TAKE 1 TABLET BY MOUTH ONCE DAILY 28 tablet 0    Dulaglutide (TRULICITY) 1.5 VN/6.7KU SOPN Inject 1.5 mg weekly 4 pen 0    insulin glargine (TOUJEO SOLOSTAR) 300 UNIT/ML injection pen Inject 140 U nightly. 12 pen 0    ergocalciferol (DRISDOL) 1.25 MG (38561 UT) capsule Take 1 capsule by mouth Twice a Week 12 capsule 3    Blood Glucose Monitoring Suppl (ONE TOUCH ULTRA 2) w/Device KIT 4 times a day 1 kit 0    blood glucose test strips (ONE TOUCH ULTRA TEST) strip 4 times daily. 150 strip 6    Insulin Pen Needle (B-D UF III MINI PEN NEEDLES) 31G X 5 MM MISC 4 times a day 150 each 6    cyclobenzaprine (FLEXERIL) 10 MG tablet Take 1 po q 8 hours prn low back pain/ tension headaches. 45 tablet 0    montelukast (SINGULAIR) 10 MG tablet Take 1 tablet by mouth nightly 30 tablet 5    promethazine (PHENERGAN) 25 MG tablet Take 1 tablet by mouth every 8 hours as needed for Nausea (headache) 40 tablet 0    topiramate (TOPAMAX) 100 MG tablet Take 1 tablet by mouth nightly 30 tablet 5    ondansetron (ZOFRAN) 8 MG tablet Take 1 tablet by mouth every 8 hours as needed for Nausea 60 tablet 1    Lancets MISC Check Blood sugars qac and qhs. 100 each 5    Glucose Blood (BLOOD GLUCOSE TEST STRIPS) STRP Check Blood sugars qac and qhs. 100 strip 5     No current facility-administered medications for this visit.         Review of Systems  Please see scanned document dated and signed      Objective:      /69 Pulse 79   Ht 5' 2.5\" (1.588 m)   Wt (!) 426 lb (193.2 kg)   BMI 76.67 kg/m²   Wt Readings from Last 3 Encounters:   08/19/20 (!) 426 lb (193.2 kg)   04/29/19 (!) 432 lb 6.4 oz (196.1 kg)   04/01/19 (!) 434 lb (196.9 kg)     Constitutional: Well-developed, appears stated age, cooperative, in no acute distress  H/E/N/M/T:atraumatic, normocephalic, external ears, nose, lips normal without lesions  Eyes: Lids, lashes, conjunctivae and sclerae normal, No proptosis, no redness  Neck: supple, symmetrical, no swelling  Skin: No obvious rashes or lesions present. Skin and hair texture normal  Psychiatric: Judgement and Insight:  judgement and insight appear normal  Neuro: Normal without focal findings, speech is normal normal, speech is spontaneous  Chest: No labored breathing, no chest deformity, no stridor  Musculoskeletal: No joint deformity, swelling    Foot exam: No ulcer, monofilament detected    Lab Reviewed   No components found for: CHLPL  Lab Results   Component Value Date    TRIG 97 01/23/2019    TRIG 98 07/06/2018    TRIG 170 (H) 12/29/2017     Lab Results   Component Value Date    HDL 51 01/23/2019    HDL 50 07/06/2018    HDL 48 12/29/2017     Lab Results   Component Value Date    LDLCALC 156 (H) 01/23/2019    LDLCALC 129 (H) 07/06/2018    LDLCALC 147 (H) 12/29/2017     Lab Results   Component Value Date    LABVLDL 19 01/23/2019    LABVLDL 20 07/06/2018    LABVLDL 34 12/29/2017     Lab Results   Component Value Date    LABA1C 9.4 04/29/2019       Assessment:     Priscilla Rodriguez is a 30 y.o. female with :    1.T2DM: Longstanding, uncontrolled, insulin resistant. On high dose of insulin. Discussed weight loss, diet changes, she is on GLP-1 agonist. Increase prandial insulin. May need U-500  Control improved recently as more adherent, made diet changes  2. HLD : LDL above target, recommend statin, recheck level  3. Obesity: Discussed weight loss, advise 1500 Kcal diet.  Discussed bariatric surgery, not interested  4. PCOS: On OCP  5. Vit D deficiency: Check level    Plan: Toujeo 140 units   Novolog 15---> 16 units AC TID   SSI 2 for 50 >150   Advise to check blood sugar 4 times a day   Patient to send blood sugar log for titration. Advise to exercise regularly. Advise to low simple carbohydrate and protein with each  meal diet. Diabetes Care: recommend yearly eye exam, foot exam and urine microalbumin to   creatinine ratio. Patient is up-to-date.

## 2020-08-25 ENCOUNTER — TELEPHONE (OUTPATIENT)
Dept: ENDOCRINOLOGY | Age: 30
End: 2020-08-25

## 2020-08-25 RX ORDER — ONDANSETRON HYDROCHLORIDE 8 MG/1
8 TABLET, FILM COATED ORAL EVERY 8 HOURS PRN
Qty: 60 TABLET | Refills: 1 | Status: SHIPPED | OUTPATIENT
Start: 2020-08-25 | End: 2020-09-25 | Stop reason: SDUPTHER

## 2020-08-25 RX ORDER — ONDANSETRON HYDROCHLORIDE 8 MG/1
8 TABLET, FILM COATED ORAL EVERY 8 HOURS PRN
Qty: 15 TABLET | Refills: 0 | OUTPATIENT
Start: 2020-08-25

## 2020-08-25 NOTE — TELEPHONE ENCOUNTER
Patient calling requesting medication refill states she is completley out and isn't feeling well. ondansetron (ZOFRAN) 8 MG tablet [[id number]]     Order Details   Dose: 8 mg  Route: Oral  Frequency: EVERY 8 HOURS PRN for Nausea    Dispense Quantity: 60 tablet  Refills: 1  Fills remaining: --             Sig: Take 1 tablet by mouth every 8 hours as needed for Nausea            Written Date: 04/04/16  Expiration Date: 04/04/17      Start Date: 04/04/16  End Date: --      Ordering Provider:  --  Authorizing Provider: Taz Amador MD  Ordering User:   Taz Amador MD           Diagnosis Association: Nausea (R11.0)       Original Order:  ondansetron (ZOFRAN) 8 MG tablet [[id number]]       [facility] [id number] [phone] - [address] [phone]

## 2020-08-25 NOTE — TELEPHONE ENCOUNTER
Called and spoke to PT with MD message  Please advise patient urine test is normal. Vit is low, should take Vitamin D 3 OTC 2000 IU daily. Cholesterol is better. Pt verbalized understanding.

## 2020-08-31 ENCOUNTER — TELEPHONE (OUTPATIENT)
Dept: ENDOCRINOLOGY | Age: 30
End: 2020-08-31

## 2020-08-31 NOTE — TELEPHONE ENCOUNTER
Javier Ricketts my meds called and would like to know if Novalog was switched to trulicity  Ref [id number]

## 2020-09-15 RX ORDER — NORGESTIMATE AND ETHINYL ESTRADIOL 7DAYSX3 28
KIT ORAL
Qty: 28 TABLET | Refills: 0 | Status: SHIPPED | OUTPATIENT
Start: 2020-09-15 | End: 2020-09-25 | Stop reason: SDUPTHER

## 2020-09-16 RX ORDER — DULAGLUTIDE 1.5 MG/.5ML
INJECTION, SOLUTION SUBCUTANEOUS
Qty: 2 ML | Refills: 3 | Status: SHIPPED | OUTPATIENT
Start: 2020-09-16 | End: 2021-06-02

## 2020-09-25 ENCOUNTER — OFFICE VISIT (OUTPATIENT)
Dept: FAMILY MEDICINE CLINIC | Age: 30
End: 2020-09-25
Payer: COMMERCIAL

## 2020-09-25 ENCOUNTER — TELEPHONE (OUTPATIENT)
Dept: ORTHOPEDIC SURGERY | Age: 30
End: 2020-09-25

## 2020-09-25 VITALS
BODY MASS INDEX: 75.34 KG/M2 | RESPIRATION RATE: 18 BRPM | SYSTOLIC BLOOD PRESSURE: 138 MMHG | DIASTOLIC BLOOD PRESSURE: 85 MMHG | WEIGHT: 293 LBS | TEMPERATURE: 98 F | OXYGEN SATURATION: 99 % | HEART RATE: 85 BPM

## 2020-09-25 DIAGNOSIS — R11.0 NAUSEA: ICD-10-CM

## 2020-09-25 PROCEDURE — 99214 OFFICE O/P EST MOD 30 MIN: CPT | Performed by: FAMILY MEDICINE

## 2020-09-25 PROCEDURE — 90471 IMMUNIZATION ADMIN: CPT | Performed by: FAMILY MEDICINE

## 2020-09-25 PROCEDURE — 90686 IIV4 VACC NO PRSV 0.5 ML IM: CPT | Performed by: FAMILY MEDICINE

## 2020-09-25 RX ORDER — LISINOPRIL 5 MG/1
5 TABLET ORAL DAILY
COMMUNITY
End: 2021-11-30

## 2020-09-25 RX ORDER — MULTIVIT-MIN/IRON/FOLIC ACID/K 18-600-40
CAPSULE ORAL
COMMUNITY
End: 2022-01-26 | Stop reason: ALTCHOICE

## 2020-09-25 RX ORDER — ONDANSETRON HYDROCHLORIDE 8 MG/1
8 TABLET, FILM COATED ORAL EVERY 8 HOURS PRN
Qty: 60 TABLET | Refills: 1 | Status: SHIPPED | OUTPATIENT
Start: 2020-09-25 | End: 2020-09-29 | Stop reason: SDUPTHER

## 2020-09-25 RX ORDER — CYCLOBENZAPRINE HCL 10 MG
TABLET ORAL
Qty: 45 TABLET | Refills: 2 | Status: SHIPPED | OUTPATIENT
Start: 2020-09-25

## 2020-09-25 RX ORDER — NORGESTIMATE AND ETHINYL ESTRADIOL 7DAYSX3 28
KIT ORAL
Qty: 28 TABLET | Refills: 11 | Status: SHIPPED | OUTPATIENT
Start: 2020-09-25 | End: 2021-11-16

## 2020-09-25 NOTE — PROGRESS NOTES
SUBJECTIVE:  35 y.o. Alie Cobian female  for follow up of diabetes, hypertension, and hypercholesterolemia. Diabetic Review of Systems - medication compliance: noncompliant some of the time, diabetic diet compliance: noncompliant some of the time, home glucose monitoring: fasting values range 70-110s-10 days , further diabetic ROS: no polyuria or polydipsia, no chest pain, dyspnea or TIA's, no numbness, tingling or pain in extremities, last eye exam approximately 4/19. She sees an endocrinologist for diabetes mellitus . Last HgbA1c= 9.6 in 8/2020 . Her Blood sugars are under better control . Highest Blood sugars in the past 3 weeks = 191 on 9/9/2020 -non fasting. She is having to take Zofran more often due to nausea likely related to improved Blood sugars . No emesis. Drinking water , milk, flavored water from [employer]. Rarely drinks [employer] . She is taking Tylenol initially for headaches and takes Flexeril if not responding. to Tylenol . She is wanting to change from birth control pills due to problems remembering. Current Outpatient Medications   Medication Sig Dispense Refill    Norgestim-Eth Estrad Triphasic 0.18/0.215/0.25 MG-35 MCG TABS TAKE ONE TABLET BY MOUTH DAILY 28 tablet 11    ondansetron (ZOFRAN) 8 MG tablet Take 1 tablet by mouth every 8 hours as needed for Nausea 60 tablet 1    cyclobenzaprine (FLEXERIL) 10 MG tablet Take 1 po q 8 hours prn low back pain/ tension headaches.  45 tablet 2    lisinopril (PRINIVIL;ZESTRIL) 5 MG tablet Take 5 mg by mouth daily      Cholecalciferol (VITAMIN D) 50 MCG (2000 UT) CAPS capsule Take by mouth      Dulaglutide (TRULICITY) 1.5 VE/9.6SR SOPN Inject 1.5mg weekly 2 mL 3    insulin lispro, 1 Unit Dial, (HUMALOG KWIKPEN) 100 UNIT/ML SOPN INJECT 15 UNITS SUBCUTANEOUSLY BEFORE MEALS 3 TIMES DAILY (Patient taking differently: INJECT 16 UNITS SUBCUTANEOUSLY BEFORE MEALS 3 TIMES DAILY) 15 mL 2    Insulin Glargine, 2 Unit Dial, (TOUJEO MAX monofilament bilaterally, no ulcers. DP/ PT pulses normal.   Skin: no rashes. Non tender. ASSESSMENT:  1. Polycystic ovarian disease  - stable . Discussed options instead of birth control pills. - Norgestim-Eth Estrad Triphasic 0.18/0.215/0.25 MG-35 MCG TABS; TAKE ONE TABLET BY MOUTH DAILY  Dispense: 28 tablet; Refill: 6  - Referral - Anuradha Toure MD, Gynecology, [facility]    2. Nausea  - likely improved Blood sugars are contributing.   - continue dietary/ lifestyle modifications. - ondansetron (ZOFRAN) 8 MG tablet; Take 1 tablet by mouth every 8 hours as needed for Nausea  Dispense: 60 tablet; Refill: 1    Tension Headaches. - cyclobenzaprine (FLEXERIL) 10 MG tablet; Take 1 po q 8 hours prn low back pain/ tension headaches. Dispense: 45 tablet; Refill: 2    4. Type 2 diabetes mellitus without complication, with long-term current use of insulin ([facility])  - HgbA1c= 9.6 in 8/2020, with recently improved blood sugar.   - Followed by Endocrinologist. Continue Toujeo, weekly Trulicity and Humalog and dietary/ lifestyle modifications. 5. Pure hypercholesterolemia  - continue low cholesterol diet. 6. Allergic rhinitis, unspecified seasonality, unspecified trigger  - stable. 7. Mild asthma without complication, unspecified whether persistent  - stable. 8. Class 3 severe obesity without serious comorbidity with body mass index (BMI) greater than or equal to 70 in adult, unspecified obesity type ([facility] [address]  - Weight loss recommended. Discussed dietary/ lifestyle modifications, including cardiovascular exercise > 150 minutes/ week. 9. Need for influenza vaccination  - INFLUENZA, QUADV, 0.5ML, 6 MO AND OLDER, IM, PF, PREFILL SYR OR SDV (FLUZONE QUADV, PF)    PLAN:  See orders for this visit as documented in the electronic medical record.   Issues reviewed with her: low cholesterol diet, weight control and daily exercise discussed, home glucose monitoring emphasized, all medications, side effects and compliance discussed carefully, foot care discussed and Podiatry visits discussed, annual eye examinations at Ophthalmology discussed, glycohemoglobin and other lab monitoring discussed and long term diabetic complications discussed. Goals:   1) Blood pressure < 130/80  2) HGA1C ideal less than 6.5, at least less than 7.0  3) LDL cholesterol < 100  4) Exercise 150 minutes a week   5) Dilated eye exam by an optometrist or ophthalmologist once a year  6) Fasting blood sugar < 110  7) Glucose 2 hours after meal < 140  8) Aspirin 81 mg once a day  9) BMI (Body Mass Index) ideal < 25, at least less than 30. Weight loss of even 10 to 15 pounds is effective in improving diabetes  9) Total fat intake to less than 60 grams per day and saturated fat to less than 20 grams per day. 10) Diabetic education and diabetic nutrition classes. [phone]) Doctor visits every 3 months. Follow up 3 months/ prn.

## 2020-09-29 RX ORDER — ONDANSETRON HYDROCHLORIDE 8 MG/1
8 TABLET, FILM COATED ORAL EVERY 8 HOURS PRN
Qty: 24 TABLET | Refills: 1 | Status: SHIPPED | OUTPATIENT
Start: 2020-09-29

## 2020-10-27 RX ORDER — INSULIN GLARGINE 300 U/ML
INJECTION, SOLUTION SUBCUTANEOUS
Qty: 12 ML | Refills: 0 | Status: SHIPPED | OUTPATIENT
Start: 2020-10-27 | End: 2021-05-06

## 2020-10-27 NOTE — TELEPHONE ENCOUNTER
Medication:   Requested Prescriptions     Pending Prescriptions Disp Refills    Insulin Glargine, 1 Unit Dial, (TOUJEO SOLOSTAR) 300 UNIT/ML SOPN [Pharmacy Med Name: Toureginoo SoloStar Subcutaneous Solution Pen-injector 300 UNIT/ML] 12 mL 0     Sig: Inject 140 units daily       Last Filled:      Patient Phone Number: [phone] (home)     Last appt: 8/19/2020   Next appt: 11/25/2020    Last Labs DM:   Lab Results   Component Value Date    LABA1C 9.6 08/19/2020

## 2020-10-30 ENCOUNTER — OFFICE VISIT (OUTPATIENT)
Dept: PRIMARY CARE CLINIC | Age: 30
End: 2020-10-30
Payer: COMMERCIAL

## 2020-10-30 PROCEDURE — 99211 OFF/OP EST MAY X REQ PHY/QHP: CPT | Performed by: NURSE PRACTITIONER

## 2020-10-30 NOTE — PROGRESS NOTES
Samuel Larios received a viral test for COVID-19. They were educated on isolation and quarantine as appropriate. For any symptoms, they were directed to seek care from their PCP, given contact information to establish with a doctor, directed to an urgent care or the emergency room.

## 2020-11-01 LAB — SARS-COV-2, NAA: NOT DETECTED

## 2020-11-03 NOTE — RESULT ENCOUNTER NOTE

## 2020-11-28 ENCOUNTER — NURSE TRIAGE (OUTPATIENT)
Dept: OTHER | Facility: CLINIC | Age: 30
End: 2020-11-28

## 2020-11-28 NOTE — TELEPHONE ENCOUNTER
(COVID-19) DIAGNOSED OR SUSPECTED-ADULT-AH    Patient called pre-service center [facility]) covid with red flag complaint. Brief description of triage: covid     Triage indicates for patient to call PCP when open     Care advice provided, patient verbalizes understanding; denies any other questions or concerns; instructed to call back for any new or worsening symptoms. Attention Provider: Thank you for allowing me to participate in the care of your patient. The patient was connected to triage in response to information provided to the ECC. Please do not respond through this encounter as the response is not directed to a shared pool.

## 2020-12-01 ENCOUNTER — OFFICE VISIT (OUTPATIENT)
Dept: PRIMARY CARE CLINIC | Age: 30
End: 2020-12-01
Payer: COMMERCIAL

## 2020-12-01 ENCOUNTER — VIRTUAL VISIT (OUTPATIENT)
Dept: FAMILY MEDICINE CLINIC | Age: 30
End: 2020-12-01
Payer: COMMERCIAL

## 2020-12-01 PROCEDURE — 99214 OFFICE O/P EST MOD 30 MIN: CPT | Performed by: FAMILY MEDICINE

## 2020-12-01 PROCEDURE — 99211 OFF/OP EST MAY X REQ PHY/QHP: CPT | Performed by: NURSE PRACTITIONER

## 2020-12-01 NOTE — PROGRESS NOTES
Mani Martinez received a viral test for COVID-19. They were educated on isolation and quarantine as appropriate. For any symptoms, they were directed to seek care from their PCP, given contact information to establish with a doctor, directed to an urgent care or the emergency room.

## 2020-12-01 NOTE — PROGRESS NOTES
2020    TELEHEALTH EVALUATION -- Audio/Visual (During BZROJ-98 public health emergency)    Due to COVID 19 outbreak, patient's office visit was converted to a virtual visit. Patient was contacted and agreed to proceed with a virtual visit via [email or web address]  The risks and benefits of converting to a virtual visit were discussed in light of the current infectious disease epidemic. Patient also understood that insurance coverage and co-pays are up to their individual insurance plans. HPI:    Samuel Larios (:  1990) has requested an audio/video evaluation for the following concern(s):    Diabetes mellitus . She went for retesting at [facility] this am for COVID.  2020 she awoke with scratchy throat . She worked that day. Saturday am she had increased sore throat and cough and stuffy nose and did not go to work.  was similar. She lost her taste yesterday . No fever. No wheezing. Intermittent shortness of breath , but not significantly different from baseline . Mild headaches . Fatigue. Sleeping okay . Some sweats at night. Cough is dry . 3 weeks ago she had COVID exposure at work. Not able to work from home. Her full time job is every other week - at [employer]. Part time job is [employer] at [employer]. Her Blood sugars have been higher = 211 this am.  Denies fever, chest pain. Some diarrhea on  only. Sleeping okay . Her Blood sugars prior were 85-95 lows  - 170s. Diabetic Review of Systems - medication compliance: compliant most of the time, diabetic diet compliance: noncompliant some of the time, home glucose monitoring: fasting values range 85-180s, further diabetic ROS: no polyuria or polydipsia, no chest pain, dyspnea or TIA's, no numbness, tingling or pain in extremities, last eye exam approximately . Review of Systems    Prior to Visit Medications    Medication Sig Taking?  Authorizing Provider   Insulin Glargine, 1 Unit Dial, (TOUJEO SOLOSTAR) 300 UNIT/ML SOPN Inject 140 units daily Yes Emily Saleh MD   ondansetron (ZOFRAN) 8 MG tablet Take 1 tablet by mouth every 8 hours as needed for Nausea Yes Frances Kapadia MD   Norgestim-Eth Estrad Triphasic 0.18/0.215/0.25 MG-35 MCG TABS TAKE ONE TABLET BY MOUTH DAILY Yes Frances Kapadia MD   cyclobenzaprine (FLEXERIL) 10 MG tablet Take 1 po q 8 hours prn low back pain/ tension headaches. Yes Frances Kapadia MD   lisinopril (PRINIVIL;ZESTRIL) 5 MG tablet Take 5 mg by mouth daily Yes Historical Provider, MD   Cholecalciferol (VITAMIN D) 50 MCG (2000 UT) CAPS capsule Take by mouth Yes Historical Provider, MD   Dulaglutide (TRULICITY) 1.5 KM/4.3CJ SOPN Inject 1.5mg weekly Yes Emily Saleh MD   insulin lispro, 1 Unit Dial, (HUMALOG KWIKPEN) 100 UNIT/ML SOPN INJECT 15 UNITS SUBCUTANEOUSLY BEFORE MEALS 3 TIMES DAILY  Patient taking differently: INJECT 16 UNITS SUBCUTANEOUSLY BEFORE MEALS 3 TIMES DAILY Yes Emily Saleh MD   albuterol sulfate HFA (VENTOLIN HFA) 108 (90 Base) MCG/ACT inhaler Inhale 2 puffs into the lungs every 6 hours as needed for Wheezing Yes Frances Kapadia MD   insulin glargine (TOUJEO SOLOSTAR) 300 UNIT/ML injection pen Inject 140 U nightly. Yes Frances Kapadia MD   ergocalciferol (DRISDOL) 1.25 MG (27073 UT) capsule Take 1 capsule by mouth Twice a Week Yes Rocio Jenkins APRN - CNP   Blood Glucose Monitoring Suppl (ONE TOUCH ULTRA 2) w/Device KIT 4 times a day Yes Emily Saleh MD   blood glucose test strips (ONE TOUCH ULTRA TEST) strip 4 times daily. Yes Emily Saleh MD   Insulin Pen Needle (B-D UF III MINI PEN NEEDLES) 31G X 5 MM MISC 4 times a day Yes Emily Saleh MD   Lancets MISC Check Blood sugars qac and qhs. Yes Frances Kapadia MD   montelukast (SINGULAIR) 10 MG tablet Take 1 tablet by mouth nightly  Patient not taking: Reported on 9/25/2020  Frances Kapadia MD   Glucose Blood (BLOOD GLUCOSE TEST STRIPS) STRP Check Blood sugars qac and qhs.   Frances Kapadia MD       Allergies   Allergen Reactions    No Known Drug Allergy        Social History     Tobacco Use    Smoking status: Never Smoker    Smokeless tobacco: Never Used   Substance Use Topics    Alcohol use:  Yes     Alcohol/week: 0.0 standard drinks     Types: 1 Standard drinks or equivalent per week     Comment: social    Drug use: No        Past Medical History:   Diagnosis Date    Acne     Allergic rhinitis     Asthma     Cellulitis     on back/ on bactrim for/ pcp aware    Diabetes mellitus type 1 ([address] 2/00    Folic acid deficiency     Hyperlipidemia     Obesity     TAY (obstructive sleep apnea) 7/07    PCOS (polycystic ovarian syndrome)     Pyelonephritis 12/08    left    Vitamin D deficiency        Past Surgical History:   Procedure Laterality Date    CHOLECYSTECTOMY  1/15/2013    MULTIPORT ROBOTIC ASSISTED LAPAROSCOPIC CHOLECYSTECTOMY    OTHER SURGICAL HISTORY  2009    Cyst/Mass excised from posterior neck    OTHER SURGICAL HISTORY  12/15    excision back lesion-Dr. Elda López    [address]   Topic Date Due    Varicella vaccine (1 of 2 - 2-dose childhood series) 09/24/1992    Hepatitis B vaccine (1 of 3 - Risk 3-dose series) 09/24/2010    Diabetic foot exam  01/23/2020    Diabetic microalbuminuria test  01/23/2020    Lipid screen  01/23/2020    Potassium monitoring  01/23/2020    Creatinine monitoring  01/23/2020    DTaP/Tdap/Td vaccine (2 - Td) 04/05/2020    Cervical cancer screen  09/25/2020    A1C test (Diabetic or Prediabetic)  11/19/2020    Diabetic retinal exam  04/29/2021    Hepatitis A vaccine  Completed    Flu vaccine  Completed    HIV screen  Completed    Hib vaccine  Aged Out    Meningococcal (ACWY) vaccine  Aged Out    Pneumococcal 0-64 years Vaccine  Aged Out       Family History   Problem Relation Age of Onset    Diabetes Mother     Diabetes Father     High Blood Pressure Father     Asthma Father        PHYSICAL EXAMINATION:    Vital Signs: (As fasting Blood sugars over the next 1-2 weeks and call/ send results. Enforced need for labs. - Discussed need for good diabetes mellitus control to minimize risk of long term complications, such as nephropathy, neuropathy, CV disease, retinopathy and peripheral vascular disease .  - Continue Toujeo 140 U qhs. Weekly Trulicity. - Comprehensive Metabolic Panel; Future  - Hemoglobin A1C; Future  - Microalbumin / Creatinine Urine Ratio; Future    4. Pure hypercholesterolemia  - Follow low cholesterol diet. - Comprehensive Metabolic Panel; Future  - Lipid Panel; Future    5. Vitamin D deficiency  - continue Vitamin d 2000 IU/ day. - Vitamin D 25 Hydroxy; Future    F/u if no improvement 6d/ prn increased symptoms/ otherwise follow up in 6 weeks. PLAN:  See orders for this visit as documented in the electronic medical record. Issues reviewed with her: low cholesterol diet, weight control and daily exercise discussed, home glucose monitoring emphasized, all medications, side effects and compliance discussed carefully, foot care discussed and Podiatry visits discussed, annual eye examinations at Ophthalmology discussed, glycohemoglobin and other lab monitoring discussed and long term diabetic complications discussed. Goals:   1) Blood pressure < 130/80  2) HGA1C ideal less than 6.5, at least less than 7.0  3) LDL cholesterol < 100  4) Exercise 150 minutes a week   5) Dilated eye exam by an optometrist or ophthalmologist once a year  6) Fasting blood sugar < 110  7) Glucose 2 hours after meal < 140  8) Aspirin 81 mg once a day  9) BMI (Body Mass Index) ideal < 25, at least less than 30. Weight loss of even 10 to 15 pounds is effective in improving diabetes  9) Total fat intake to less than 60 grams per day and saturated fat to less than 20 grams per day. 10) Diabetic education and diabetic nutrition classes. [phone] 80) Doctor visits every 3 months.       The time that was spent with the family/patient addressing care on this video call was 20 minutes. An  electronic signature was used to authenticate this note. --Rhona Carr MD on 12/1/2020 at 9:43 AM    Pursuant to the emergency declaration under the [address] [place] [address], [facility] waiver authority and the [employer] Act, this Virtual  Visit was conducted, with patient's consent, to reduce the patient's risk of exposure to COVID-19 and provide continuity of care for an established patient. Services were provided through a video synchronous discussion virtually to substitute for in-person clinic visit.

## 2020-12-02 LAB — SARS-COV-2, NAA: DETECTED

## 2020-12-03 ENCOUNTER — CARE COORDINATION (OUTPATIENT)
Dept: CARE COORDINATION | Age: 30
End: 2020-12-03

## 2020-12-08 ENCOUNTER — OFFICE VISIT (OUTPATIENT)
Dept: PRIMARY CARE CLINIC | Age: 30
End: 2020-12-08
Payer: COMMERCIAL

## 2020-12-08 PROCEDURE — 99211 OFF/OP EST MAY X REQ PHY/QHP: CPT | Performed by: NURSE PRACTITIONER

## 2020-12-08 NOTE — PROGRESS NOTES
Didi Thompson received a viral test for COVID-19. They were educated on isolation and quarantine as appropriate. For any symptoms, they were directed to seek care from their PCP, given contact information to establish with a doctor, directed to an urgent care or the emergency room.

## 2020-12-11 PROBLEM — U07.1 COVID-19: Status: ACTIVE | Noted: 2020-12-01

## 2020-12-11 LAB — SARS-COV-2, NAA: DETECTED

## 2021-05-06 RX ORDER — INSULIN LISPRO 100 [IU]/ML
INJECTION, SOLUTION INTRAVENOUS; SUBCUTANEOUS
Qty: 15 ML | Refills: 0 | Status: SHIPPED | OUTPATIENT
Start: 2021-05-06 | End: 2021-06-02 | Stop reason: SDUPTHER

## 2021-05-06 RX ORDER — INSULIN GLARGINE 300 U/ML
INJECTION, SOLUTION SUBCUTANEOUS
Qty: 12 ML | Refills: 0 | Status: SHIPPED | OUTPATIENT
Start: 2021-05-06 | End: 2021-06-02 | Stop reason: SDUPTHER

## 2021-05-06 NOTE — TELEPHONE ENCOUNTER
Medication:   Requested Prescriptions     Pending Prescriptions Disp Refills    insulin lispro, 1 Unit Dial, (HUMALOG KWIKPEN) 100 UNIT/ML SOPN [Pharmacy Med Name: HumaLOG KwikPen Subcutaneous Solution Pen-injector 100 UNIT/ML] 15 mL 0     Sig: Inject 15 units subcutaneously 3 times a day before meals    TOUJEO SOLOSTAR 300 UNIT/ML SOPN [Pharmacy Med Name: Toujeo SoloStar Subcutaneous Solution Pen-injector 300 UNIT/ML] 12 mL 0     Sig: Inject 140 units daily       Last Filled:      Patient Phone Number: [phone] (home)     Last appt: 8/19/2020   Next appt: 6/2/2021    Last Labs DM:   Lab Results   Component Value Date    LABA1C 9.6 08/19/2020

## 2021-06-02 ENCOUNTER — OFFICE VISIT (OUTPATIENT)
Dept: ENDOCRINOLOGY | Age: 31
End: 2021-06-02
Payer: COMMERCIAL

## 2021-06-02 VITALS — HEIGHT: 63 IN | WEIGHT: 293 LBS | BODY MASS INDEX: 51.91 KG/M2

## 2021-06-02 DIAGNOSIS — E11.65 UNCONTROLLED TYPE 2 DIABETES MELLITUS WITH HYPERGLYCEMIA (HCC): Primary | ICD-10-CM

## 2021-06-02 PROCEDURE — 99214 OFFICE O/P EST MOD 30 MIN: CPT | Performed by: INTERNAL MEDICINE

## 2021-06-02 RX ORDER — DULAGLUTIDE 3 MG/.5ML
3 INJECTION, SOLUTION SUBCUTANEOUS WEEKLY
Qty: 4 PEN | Refills: 2 | Status: SHIPPED | OUTPATIENT
Start: 2021-06-02 | End: 2021-06-02 | Stop reason: SDUPTHER

## 2021-06-02 RX ORDER — ESCITALOPRAM OXALATE 10 MG/1
10 TABLET ORAL DAILY
COMMUNITY
End: 2021-11-30 | Stop reason: SDUPTHER

## 2021-06-02 RX ORDER — FLASH GLUCOSE SENSOR
KIT MISCELLANEOUS
Qty: 6 EACH | Refills: 0 | Status: SHIPPED | OUTPATIENT
Start: 2021-06-02 | End: 2021-11-03 | Stop reason: SDUPTHER

## 2021-06-02 RX ORDER — INSULIN GLARGINE 300 U/ML
INJECTION, SOLUTION SUBCUTANEOUS
Qty: 12 PEN | Refills: 0 | Status: SHIPPED | OUTPATIENT
Start: 2021-06-02 | End: 2021-11-30

## 2021-06-02 RX ORDER — INSULIN LISPRO 100 [IU]/ML
INJECTION, SOLUTION INTRAVENOUS; SUBCUTANEOUS
Qty: 15 PEN | Refills: 0 | Status: SHIPPED | OUTPATIENT
Start: 2021-06-02 | End: 2021-11-30 | Stop reason: SDUPTHER

## 2021-06-02 RX ORDER — DULOXETIN HYDROCHLORIDE 30 MG/1
30 CAPSULE, DELAYED RELEASE ORAL DAILY
COMMUNITY
End: 2021-11-30

## 2021-06-02 RX ORDER — FLASH GLUCOSE SCANNING READER
EACH MISCELLANEOUS
Qty: 1 EACH | Refills: 0 | Status: SHIPPED | OUTPATIENT
Start: 2021-06-02 | End: 2022-10-17

## 2021-06-02 RX ORDER — DULAGLUTIDE 3 MG/.5ML
3 INJECTION, SOLUTION SUBCUTANEOUS WEEKLY
Qty: 13 PEN | Refills: 2 | Status: SHIPPED | OUTPATIENT
Start: 2021-06-02 | End: 2021-11-19 | Stop reason: SDUPTHER

## 2021-06-02 NOTE — PROGRESS NOTES
Seen as f/u patient for diabetes    Interim;   Seen after 8/20  A1c high     Diagnosed with Type 2 diabetes mellitus at age 16 years  Known diabetic complications: none   Uncontrolled  Insulin started a few year after diagnosis  On metformin initially at time of diagnosis    Current diabetic medications     Toujeo 395 units   Trulicity  Novolog 15 units AC TID     SSI 2 for 50 >150    Did not tolerate metformin    Last A1c 11.8%<-----9.6%<-----9.4%<-----10.1%<------- 11.7 on 7/18 <--- 11.3 <--- 10.9    Prior visit with dietician: Yes   Current diet: on average, 3 meals per day   Current exercise: walking   Current monitoring regimen: home blood tests - not checking for a month    Has brought blood glucose log/meter: yes   Home blood sugar records: 104-226  Any episodes of hypoglycemia? --    No Hx of CAD , PVD, CVA    Hyperlipidemia:   Not on medication  uncontrolled   on 7/18   on 1/19   on 8/20    Last eye exam: 2020  Last foot exam: 1/19  Last microalbumin to creatinine ratio: 8/20    She has a h/o PCOS, on OCP    H/o obesity. Has been gaining weight.  She is on low CHO diet  Does reports ate more, increased appetite    She has vit D deficiency  Last 7 on 4.19    Takes vit D 50,000 IU1/week  Restarted 4/21  Level was 25      SH: Mom is our clinic patient, maureen carrillo    Past Medical History:   Diagnosis Date    Acne     Allergic rhinitis     Asthma     Cellulitis     on back/ on bactrim for/ pcp aware    COVID-19 12/2020    Diabetes mellitus type 1 ([address] 0/14    Folic acid deficiency     Hyperlipidemia     Obesity     TAY (obstructive sleep apnea) 7/07    PCOS (polycystic ovarian syndrome)     Pyelonephritis 12/08    left    Vitamin D deficiency      Past Surgical History:   Procedure Laterality Date    CHOLECYSTECTOMY  1/15/2013    MULTIPORT ROBOTIC ASSISTED LAPAROSCOPIC CHOLECYSTECTOMY    OTHER SURGICAL HISTORY  2009    Cyst/Mass excised from posterior neck    OTHER SURGICAL HISTORY  12/15    excision back lesion-Dr. Olivares Gamma    WISDOM TOOTH EXTRACTION       Current Outpatient Medications   Medication Sig Dispense Refill    DULoxetine (CYMBALTA) 30 MG extended release capsule Take 30 mg by mouth daily      escitalopram (LEXAPRO) 10 MG tablet Take 10 mg by mouth daily 1/2 daily for 6 days then after 1 daily      insulin lispro, 1 Unit Dial, (HUMALOG KWIKPEN) 100 UNIT/ML SOPN INJECT 16 UNITS SUBCUTANEOUSLY BEFORE MEALS 3 TIMES DAILY 15 mL 0    TOUJEO SOLOSTAR 300 UNIT/ML SOPN Inject 140 units daily 12 mL 0    ondansetron (ZOFRAN) 8 MG tablet Take 1 tablet by mouth every 8 hours as needed for Nausea 24 tablet 1    Norgestim-Eth Estrad Triphasic 0.18/0.215/0.25 MG-35 MCG TABS TAKE ONE TABLET BY MOUTH DAILY 28 tablet 11    cyclobenzaprine (FLEXERIL) 10 MG tablet Take 1 po q 8 hours prn low back pain/ tension headaches. 45 tablet 2    lisinopril (PRINIVIL;ZESTRIL) 5 MG tablet Take 5 mg by mouth daily      Cholecalciferol (VITAMIN D) 50 MCG (2000 UT) CAPS capsule Take by mouth      Dulaglutide (TRULICITY) 1.5 BQ/1.5PU SOPN Inject 1.5mg weekly 2 mL 3    albuterol sulfate HFA (VENTOLIN HFA) 108 (90 Base) MCG/ACT inhaler Inhale 2 puffs into the lungs every 6 hours as needed for Wheezing 1 Inhaler 0    insulin glargine (TOUJEO SOLOSTAR) 300 UNIT/ML injection pen Inject 140 U nightly. 12 pen 0    Blood Glucose Monitoring Suppl (ONE TOUCH ULTRA 2) w/Device KIT 4 times a day 1 kit 0    blood glucose test strips (ONE TOUCH ULTRA TEST) strip 4 times daily.  150 strip 6    Insulin Pen Needle (B-D UF III MINI PEN NEEDLES) 31G X 5 MM MISC 4 times a day 150 each 6    montelukast (SINGULAIR) 10 MG tablet Take 1 tablet by mouth nightly 30 tablet 5    Lancets MISC Check Blood sugars qac and qhs. 100 each 5    ergocalciferol (DRISDOL) 1.25 MG (40146 UT) capsule Take 1 capsule by mouth Twice a Week 12 capsule 3    Glucose Blood (BLOOD GLUCOSE TEST STRIPS) STRP Check Blood sugars qac and qhs. 100 strip 5     No current facility-administered medications for this visit. Review of Systems  Please see scanned document dated and signed      Objective:      Ht 5' 3\" (1.6 m)   Wt (!) 424 lb (192.3 kg)   LMP 05/21/2021 (Exact Date)   BMI 75.11 kg/m²   Wt Readings from Last 3 Encounters:   06/02/21 (!) 424 lb (192.3 kg)   09/25/20 (!) 418 lb 9.6 oz (189.9 kg)   08/19/20 (!) 426 lb (193.2 kg)     Constitutional: Well-developed, appears stated age, cooperative, in no acute distress  H/E/N/M/T:atraumatic, normocephalic, external ears, nose, lips normal without lesions  Eyes: Lids, lashes, conjunctivae and sclerae normal, No proptosis, no redness  Neck: supple, symmetrical, no swelling  Skin: No obvious rashes or lesions present. Skin and hair texture normal  Psychiatric: Judgement and Insight:  judgement and insight appear normal  Neuro: Normal without focal findings, speech is normal normal, speech is spontaneous  Chest: No labored breathing, no chest deformity, no stridor  Musculoskeletal: No joint deformity, swelling    Foot exam: No ulcer, monofilament detected    Lab Reviewed   No components found for: CHLPL  Lab Results   Component Value Date    TRIG 97 01/23/2019    TRIG 98 07/06/2018    TRIG 170 (H) 12/29/2017     Lab Results   Component Value Date    HDL 51 01/23/2019    HDL 50 07/06/2018    HDL 48 12/29/2017     Lab Results   Component Value Date    LDLCALC 156 (H) 01/23/2019    LDLCALC 129 (H) 07/06/2018    LDLCALC 147 (H) 12/29/2017     Lab Results   Component Value Date    LABVLDL 19 01/23/2019    LABVLDL 20 07/06/2018    LABVLDL 34 12/29/2017     Lab Results   Component Value Date    LABA1C 9.6 08/19/2020       Assessment:     Daniel Lujan is a 35 y.o. female with :    1.T2DM: Longstanding, uncontrolled, insulin resistant. On high dose of insulin. Discussed weight loss, diet changes, she is on GLP-1 agonist. Will try higher dose. Increase prandial insulin.  Will change dose slightly as recent control better. She was lost to f/u. Check CGM coverage. May need U-500  Control improved recently as more adherent, made diet changes  No plan for pregnancy. Discussed risk with uncontrolled DM  2. HLD : LDL above target, recommend statin, recheck level  3. Obesity: Discussed weight loss, advise 1500 Kcal diet. Discussed bariatric surgery, not interested  4. PCOS: On OCP  5. Vit D deficiency: Check level    Plan: Toujeo 140 units   Novolog 15---> 18   SSI 2 for 50 >150   Advise to check blood sugar 4 times a day   Patient to send blood sugar log for titration. Advise to exercise regularly. Advise to low simple carbohydrate and protein with each  meal diet. Diabetes Care: recommend yearly eye exam, foot exam and urine microalbumin to   creatinine ratio. Patient is up-to-date.

## 2021-06-28 RX ORDER — MONTELUKAST SODIUM 10 MG/1
10 TABLET ORAL NIGHTLY
Qty: 30 TABLET | Refills: 5 | Status: SHIPPED | OUTPATIENT
Start: 2021-06-28

## 2021-06-28 NOTE — TELEPHONE ENCOUNTER
Requested Prescriptions     Pending Prescriptions Disp Refills    montelukast (SINGULAIR) 10 MG tablet 30 tablet 5     Sig: Take 1 tablet by mouth nightly       LOV 12/1/2020  Labs 12/8/20  No f/u

## 2021-07-13 ENCOUNTER — TELEPHONE (OUTPATIENT)
Dept: ADMINISTRATIVE | Age: 31
End: 2021-07-13

## 2021-07-13 NOTE — TELEPHONE ENCOUNTER
Received a phone call from Inesronnell Marlow. with [employer] stated that the PA for Trulicity 9FS/1.6JH pen-injectors was APPROVED from 07/13/2021 through 07/13/2022. Will scan approval letter when received. Please advise patient. Thank you!

## 2021-07-13 NOTE — TELEPHONE ENCOUNTER
PA COVER MY MEDS  Medication:Trulicity 3VJ/9.9OG pen-injectors  Key  [id number] - PA Case ID: [id number]  PENDING

## 2021-07-28 RX ORDER — ALBUTEROL SULFATE 90 UG/1
2 AEROSOL, METERED RESPIRATORY (INHALATION) EVERY 6 HOURS PRN
Qty: 1 INHALER | Refills: 1 | Status: SHIPPED | OUTPATIENT
Start: 2021-07-28 | End: 2022-08-19 | Stop reason: SDUPTHER

## 2021-10-29 ENCOUNTER — TELEPHONE (OUTPATIENT)
Dept: FAMILY MEDICINE CLINIC | Age: 31
End: 2021-10-29

## 2021-10-29 NOTE — TELEPHONE ENCOUNTER
----- Message from Jasmin Fozia sent at 10/27/2021  3:46 PM EDT -----  Subject: Message to Provider    QUESTIONS  Information for Provider? Patient is wanting to see if they can switch to   Dr Jeannette Mckinnon instead of Dr Robert Moscoso  ---------------------------------------------------------------------------  --------------  [address]  What is the best way for the office to contact you? OK to leave message on   voicemail, OK to respond with electronic message via [employer] portal (only   for patients who have registered [employer] account)  Preferred Call Back Phone Number? [phone]  ---------------------------------------------------------------------------  --------------  SCRIPT ANSWERS  Relationship to Patient?  Self

## 2021-11-03 RX ORDER — FLASH GLUCOSE SENSOR
KIT MISCELLANEOUS
Qty: 6 EACH | Refills: 0 | Status: SHIPPED | OUTPATIENT
Start: 2021-11-03 | End: 2021-11-30 | Stop reason: SDUPTHER

## 2021-11-19 RX ORDER — DULAGLUTIDE 3 MG/.5ML
3 INJECTION, SOLUTION SUBCUTANEOUS WEEKLY
Qty: 13 PEN | Refills: 2 | Status: SHIPPED | OUTPATIENT
Start: 2021-11-19 | End: 2022-05-23

## 2021-11-30 ENCOUNTER — OFFICE VISIT (OUTPATIENT)
Dept: FAMILY MEDICINE CLINIC | Age: 31
End: 2021-11-30
Payer: COMMERCIAL

## 2021-11-30 ENCOUNTER — OFFICE VISIT (OUTPATIENT)
Dept: ENDOCRINOLOGY | Age: 31
End: 2021-11-30
Payer: COMMERCIAL

## 2021-11-30 VITALS
HEIGHT: 63 IN | SYSTOLIC BLOOD PRESSURE: 132 MMHG | WEIGHT: 293 LBS | DIASTOLIC BLOOD PRESSURE: 90 MMHG | HEART RATE: 85 BPM | BODY MASS INDEX: 51.91 KG/M2 | OXYGEN SATURATION: 97 %

## 2021-11-30 VITALS
HEART RATE: 85 BPM | SYSTOLIC BLOOD PRESSURE: 132 MMHG | OXYGEN SATURATION: 97 % | TEMPERATURE: 97.9 F | BODY MASS INDEX: 78.33 KG/M2 | RESPIRATION RATE: 14 BRPM | DIASTOLIC BLOOD PRESSURE: 90 MMHG | WEIGHT: 293 LBS

## 2021-11-30 DIAGNOSIS — E66.01 CLASS 3 SEVERE OBESITY WITH SERIOUS COMORBIDITY AND BODY MASS INDEX (BMI) GREATER THAN OR EQUAL TO 70 IN ADULT, UNSPECIFIED OBESITY TYPE (HCC): ICD-10-CM

## 2021-11-30 DIAGNOSIS — G47.33 OSA (OBSTRUCTIVE SLEEP APNEA): ICD-10-CM

## 2021-11-30 DIAGNOSIS — E11.9 TYPE 2 DIABETES MELLITUS WITHOUT COMPLICATION, WITH LONG-TERM CURRENT USE OF INSULIN (HCC): ICD-10-CM

## 2021-11-30 DIAGNOSIS — Z79.4 TYPE 2 DIABETES MELLITUS WITHOUT COMPLICATION, WITH LONG-TERM CURRENT USE OF INSULIN (HCC): ICD-10-CM

## 2021-11-30 DIAGNOSIS — E55.9 VITAMIN D DEFICIENCY: ICD-10-CM

## 2021-11-30 DIAGNOSIS — F41.8 DEPRESSION WITH ANXIETY: ICD-10-CM

## 2021-11-30 DIAGNOSIS — G47.33 OBSTRUCTIVE SLEEP APNEA: ICD-10-CM

## 2021-11-30 DIAGNOSIS — E28.2 PCOS (POLYCYSTIC OVARIAN SYNDROME): ICD-10-CM

## 2021-11-30 DIAGNOSIS — E11.65 UNCONTROLLED TYPE 2 DIABETES MELLITUS WITH HYPERGLYCEMIA (HCC): Primary | ICD-10-CM

## 2021-11-30 DIAGNOSIS — E66.01 MORBID OBESITY (HCC): Primary | ICD-10-CM

## 2021-11-30 DIAGNOSIS — E78.00 PURE HYPERCHOLESTEROLEMIA: ICD-10-CM

## 2021-11-30 PROBLEM — U07.1 COVID-19: Status: RESOLVED | Noted: 2020-12-01 | Resolved: 2021-11-30

## 2021-11-30 LAB
A/G RATIO: 1.6 (ref 1.1–2.2)
ALBUMIN SERPL-MCNC: 4.5 G/DL (ref 3.4–5)
ALP BLD-CCNC: 73 U/L (ref 40–129)
ALT SERPL-CCNC: 8 U/L (ref 10–40)
ANION GAP SERPL CALCULATED.3IONS-SCNC: 14 MMOL/L (ref 3–16)
AST SERPL-CCNC: 10 U/L (ref 15–37)
BILIRUB SERPL-MCNC: 0.3 MG/DL (ref 0–1)
BUN BLDV-MCNC: 9 MG/DL (ref 7–20)
CALCIUM SERPL-MCNC: 9.5 MG/DL (ref 8.3–10.6)
CHLORIDE BLD-SCNC: 96 MMOL/L (ref 99–110)
CHOLESTEROL, TOTAL: 192 MG/DL (ref 0–199)
CO2: 26 MMOL/L (ref 21–32)
CREAT SERPL-MCNC: <0.5 MG/DL (ref 0.6–1.1)
CREATININE URINE: 165.6 MG/DL (ref 28–259)
GFR AFRICAN AMERICAN: >60
GFR NON-AFRICAN AMERICAN: >60
GLUCOSE BLD-MCNC: 122 MG/DL (ref 70–99)
HCT VFR BLD CALC: 37.8 % (ref 36–48)
HDLC SERPL-MCNC: 62 MG/DL (ref 40–60)
HEMOGLOBIN: 12.5 G/DL (ref 12–16)
LDL CHOLESTEROL CALCULATED: 110 MG/DL
MCH RBC QN AUTO: 26.6 PG (ref 26–34)
MCHC RBC AUTO-ENTMCNC: 33.2 G/DL (ref 31–36)
MCV RBC AUTO: 80.2 FL (ref 80–100)
MICROALBUMIN UR-MCNC: <1.2 MG/DL
MICROALBUMIN/CREAT UR-RTO: NORMAL MG/G (ref 0–30)
PDW BLD-RTO: 14.3 % (ref 12.4–15.4)
PLATELET # BLD: 386 K/UL (ref 135–450)
PMV BLD AUTO: 7.8 FL (ref 5–10.5)
POTASSIUM SERPL-SCNC: 4.3 MMOL/L (ref 3.5–5.1)
RBC # BLD: 4.71 M/UL (ref 4–5.2)
SODIUM BLD-SCNC: 136 MMOL/L (ref 136–145)
TOTAL PROTEIN: 7.4 G/DL (ref 6.4–8.2)
TRIGL SERPL-MCNC: 101 MG/DL (ref 0–150)
TSH SERPL DL<=0.05 MIU/L-ACNC: 3.04 UIU/ML (ref 0.27–4.2)
VITAMIN D 25-HYDROXY: 25.1 NG/ML
VLDLC SERPL CALC-MCNC: 20 MG/DL
WBC # BLD: 10.6 K/UL (ref 4–11)

## 2021-11-30 PROCEDURE — 95251 CONT GLUC MNTR ANALYSIS I&R: CPT | Performed by: INTERNAL MEDICINE

## 2021-11-30 PROCEDURE — 99214 OFFICE O/P EST MOD 30 MIN: CPT | Performed by: FAMILY MEDICINE

## 2021-11-30 PROCEDURE — 99214 OFFICE O/P EST MOD 30 MIN: CPT | Performed by: INTERNAL MEDICINE

## 2021-11-30 RX ORDER — FLASH GLUCOSE SENSOR
KIT MISCELLANEOUS
Qty: 2 EACH | Refills: 3 | Status: SHIPPED | OUTPATIENT
Start: 2021-11-30 | End: 2022-03-28 | Stop reason: SDUPTHER

## 2021-11-30 RX ORDER — LISINOPRIL 10 MG/1
10 TABLET ORAL DAILY
COMMUNITY
End: 2022-02-14 | Stop reason: SDUPTHER

## 2021-11-30 RX ORDER — INSULIN LISPRO 100 [IU]/ML
INJECTION, SOLUTION INTRAVENOUS; SUBCUTANEOUS
Qty: 10 PEN | Refills: 5 | Status: SHIPPED | OUTPATIENT
Start: 2021-11-30 | End: 2022-10-20

## 2021-11-30 RX ORDER — INSULIN GLARGINE 300 U/ML
INJECTION, SOLUTION SUBCUTANEOUS
Qty: 12 PEN | Refills: 5 | Status: SHIPPED | OUTPATIENT
Start: 2021-11-30 | End: 2022-01-24

## 2021-11-30 RX ORDER — ESCITALOPRAM OXALATE 20 MG/1
20 TABLET ORAL DAILY
Qty: 30 TABLET | Refills: 5 | Status: SHIPPED | OUTPATIENT
Start: 2021-11-30 | End: 2022-06-12

## 2021-11-30 SDOH — ECONOMIC STABILITY: TRANSPORTATION INSECURITY
IN THE PAST 12 MONTHS, HAS THE LACK OF TRANSPORTATION KEPT YOU FROM MEDICAL APPOINTMENTS OR FROM GETTING MEDICATIONS?: NO

## 2021-11-30 SDOH — ECONOMIC STABILITY: FOOD INSECURITY: WITHIN THE PAST 12 MONTHS, YOU WORRIED THAT YOUR FOOD WOULD RUN OUT BEFORE YOU GOT MONEY TO BUY MORE.: NEVER TRUE

## 2021-11-30 SDOH — ECONOMIC STABILITY: FOOD INSECURITY: WITHIN THE PAST 12 MONTHS, THE FOOD YOU BOUGHT JUST DIDN'T LAST AND YOU DIDN'T HAVE MONEY TO GET MORE.: NEVER TRUE

## 2021-11-30 SDOH — ECONOMIC STABILITY: TRANSPORTATION INSECURITY
IN THE PAST 12 MONTHS, HAS LACK OF TRANSPORTATION KEPT YOU FROM MEETINGS, WORK, OR FROM GETTING THINGS NEEDED FOR DAILY LIVING?: NO

## 2021-11-30 ASSESSMENT — SOCIAL DETERMINANTS OF HEALTH (SDOH): HOW HARD IS IT FOR YOU TO PAY FOR THE VERY BASICS LIKE FOOD, HOUSING, MEDICAL CARE, AND HEATING?: NOT HARD AT ALL

## 2021-11-30 NOTE — Clinical Note
Sup man  [address] [facility] all is well. Sending over a nice lady with hx of obstructive sleep apnea but has not been treated for YEARS. You see her dad Guzman Mckenzie.   She is very nice and has a whoppingly high BMI at 67    Thanks  [address] [facility] your holidays are going well  BP

## 2021-11-30 NOTE — PROGRESS NOTES
Seen as f/u patient for diabetes    Interim;     A1c pending  7.8% in office  Using CGM  18 lb gain    Some night lows  Diagnosed with Type 2 diabetes mellitus at age 16 years  Known diabetic complications: none   Uncontrolled  Insulin started a few year after diagnosis  On metformin initially at time of diagnosis    Current diabetic medications     Toujeo 112 units   Trulicity 3mg  Novolog 18 units AC TID     SSI 2 for 50 >150    Did not tolerate metformin    Last A1c 7.8%<----- 9.6%<----- 11.8%<-----9.6%<-----9.4%<-----10.1%<------- 11.7 on 7/18 <--- 11.3 <--- 10.9    Prior visit with dietician: Yes   Current diet: on average, 3 meals per day   Current exercise: walking   Current monitoring regimen: home blood tests - not checking for a month    Has brought blood glucose log/meter: yes   Home blood sugar records: CGM    average 186  110-240  No significant lows    Any episodes of hypoglycemia? --    No Hx of CAD , PVD, CVA    Hyperlipidemia:   Not on medication  uncontrolled   on 7/18   on 1/19   on 8/20    Last eye exam: 2020  Last foot exam: 1/19  Last microalbumin to creatinine ratio: 8/20    She has a h/o PCOS, on OCP    H/o obesity. Has been gaining weight.  She is on low CHO diet  Does reports ate more, increased appetite    She has vit D deficiency  Last 7 on 4.19    Takes vit D 50,000 IU1/week  Restarted 4/21  Level was 25      SH: Mom is our clinic patient, maureen carrillo    Past Medical History:   Diagnosis Date    Acne     Allergic rhinitis     Asthma     Cellulitis     on back/ on bactrim for/ pcp aware    COVID-19 12/2020    Diabetes mellitus type 1 ([address] 6/73    Folic acid deficiency     Hyperlipidemia     Obesity     TAY (obstructive sleep apnea) 7/07    PCOS (polycystic ovarian syndrome)     Pyelonephritis 12/08    left    Vitamin D deficiency      Past Surgical History:   Procedure Laterality Date    CHOLECYSTECTOMY  1/15/2013    MULTIPORT ROBOTIC ASSISTED LAPAROSCOPIC CHOLECYSTECTOMY    OTHER SURGICAL HISTORY  2009    Cyst/Mass excised from posterior neck    OTHER SURGICAL HISTORY  12/15    excision back lesion-Dr. Marcin Bales    WISDOM TOOTH EXTRACTION       Current Outpatient Medications   Medication Sig Dispense Refill    lisinopril (PRINIVIL;ZESTRIL) 10 MG tablet Take 10 mg by mouth daily      escitalopram (LEXAPRO) 20 MG tablet Take 1 tablet by mouth daily 30 tablet 5    Dulaglutide (TRULICITY) 3 OD/4.9XM SOPN Inject 3 mg into the skin once a week 13 pen 2    Norgestim-Eth Estrad Triphasic 0.18/0.215/0.25 MG-35 MCG TABS TAKE ONE TABLET BY MOUTH DAILY 28 tablet 11    Continuous Blood Gluc Sensor (FREESTYLE SHARDA 2 SENSOR) MISC Every 2 week as needed 6 each 0    albuterol sulfate  (90 Base) MCG/ACT inhaler Inhale 2 puffs into the lungs every 6 hours as needed for wheezing 1 Inhaler 1    montelukast (SINGULAIR) 10 MG tablet Take 1 tablet by mouth nightly 30 tablet 5    insulin lispro, 1 Unit Dial, (HUMALOG KWIKPEN) 100 UNIT/ML SOPN INJECT 18-24  UNITS SUBCUTANEOUSLY BEFORE MEALS 3 TIMES DAILY 15 pen 0    Continuous Blood Gluc  (FREESTYLE SHARDA 2 READER) JERICA As needed 1 each 0    ondansetron (ZOFRAN) 8 MG tablet Take 1 tablet by mouth every 8 hours as needed for Nausea 24 tablet 1    cyclobenzaprine (FLEXERIL) 10 MG tablet Take 1 po q 8 hours prn low back pain/ tension headaches. 45 tablet 2    Cholecalciferol (VITAMIN D) 50 MCG (2000 UT) CAPS capsule Take by mouth      insulin glargine (TOUJEO SOLOSTAR) 300 UNIT/ML injection pen Inject 140 U nightly. 12 pen 0    ergocalciferol (DRISDOL) 1.25 MG (42330 UT) capsule Take 1 capsule by mouth Twice a Week 12 capsule 3    Insulin Pen Needle (B-D UF III MINI PEN NEEDLES) 31G X 5 MM MISC 4 times a day 150 each 6     No current facility-administered medications for this visit.        Review of Systems  Please see scanned document dated and signed      Objective:      BP (!) 132/90   Pulse 85   Ht 5' 3\" (1.6 m)   Wt (!) 442 lb 4.8 oz (200.6 kg)   LMP 10/30/2021 (Exact Date)   SpO2 97%   Breastfeeding No   BMI 78.35 kg/m²   Wt Readings from Last 3 Encounters:   11/30/21 (!) 442 lb 4.8 oz (200.6 kg)   11/30/21 (!) 442 lb 3.2 oz (200.6 kg)   06/02/21 (!) 424 lb (192.3 kg)     Constitutional: Well-developed, appears stated age, cooperative, in no acute distress  H/E/N/M/T:atraumatic, normocephalic, external ears, nose, lips normal without lesions  Eyes: Lids, lashes, conjunctivae and sclerae normal, No proptosis, no redness  Neck: supple, symmetrical, no swelling  Skin: No obvious rashes or lesions present. Skin and hair texture normal  Psychiatric: Judgement and Insight:  judgement and insight appear normal  Neuro: Normal without focal findings, speech is normal normal, speech is spontaneous  Chest: No labored breathing, no chest deformity, no stridor  Musculoskeletal: No joint deformity, swelling    Foot exam: No ulcer, monofilament detected    Lab Reviewed   No components found for: CHLPL  Lab Results   Component Value Date    TRIG 97 01/23/2019    TRIG 98 07/06/2018    TRIG 170 (H) 12/29/2017     Lab Results   Component Value Date    HDL 51 01/23/2019    HDL 50 07/06/2018    HDL 48 12/29/2017     Lab Results   Component Value Date    LDLCALC 156 (H) 01/23/2019    LDLCALC 129 (H) 07/06/2018    LDLCALC 147 (H) 12/29/2017     Lab Results   Component Value Date    LABVLDL 19 01/23/2019    LABVLDL 20 07/06/2018    LABVLDL 34 12/29/2017     Lab Results   Component Value Date    LABA1C 9.6 08/19/2020       Assessment:     Shazia Pretty is a 28 y.o. female with :    1.T2DM: Longstanding, uncontrolled, insulin resistant. On high dose of insulin. Discussed weight loss, diet changes, she is on GLP-1 agonist.  Increase prandial insulin. A1c better  Check CGM coverage. May need U-500  Control improved recently as more adherent, made diet changes  No plan for pregnancy. Discussed risk with uncontrolled DM  2. HLD : LDL above target, recommend statin, recheck level pending  3. Obesity: Discussed weight loss, advise 1500 Kcal diet. Discussed bariatric surgery, refer as she will discuss  4. PCOS: On OCP  5. Vit D deficiency: Repeat pending    Plan: Toujeo 140 units   Novolog 18---> 20   SSI 2 for 50 >150   Advise to check blood sugar 4 times a day   Patient to send blood sugar log for titration. Advise to exercise regularly. Advise to low simple carbohydrate and protein with each  meal diet. Diabetes Care: recommend yearly eye exam, foot exam and urine microalbumin to   creatinine ratio. Patient is up-to-date.

## 2021-11-30 NOTE — PROGRESS NOTES
Here for f/u and to get back established, work on her sleep apnea, diabetes mellitus, and anxiety/depression    Pt states that she does have hx of diabetes mellitus, does monitor her blood sugars regularly and in the past is doing better. Pt using the CGM and has found that it has done very well to monitor blood sugars. Pt does monitor regularly. Pt taking humalog 16-24 with each meal and then toujeo 140 units qhs. Pt also on trulicity 3mg weekly. blood sugars have been a bit high in the past few days as she had run out of her trulicity. 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  Pt does enjoy the job although it is stressful. Pt is doing well to try and exercise 3x per week, goes to [place] and does feel well when exercising. Pt does not pay attention to diet. Pt living at home and they do not eat as well. Pt also works at [employer] part time and eats there. Pt states that she does have some issues of depression and anxiety. Pt states that she feels that more of her sx are anxiety-related. Pt feels that it triggers randomly, but seems more common related to driving, being out in crowded places. Can struggle with going to [employer] games. Struggles to go to grocery stores, especially if super-crowded. Pt has tried counseling in the past but was not overly helpful. Except as noted above in the history of present illness, the review of systems is  negative for headache, vision changes, chest pain, shortness of breath, abdominal pain, urinary sx, bowel changes.     Past medical, surgical, and social history reviewed and updated  Medications and allergies reviewed and updated      O: BP (!) 132/90 (Site: Right Wrist, Position: Sitting, Cuff Size: Large Adult)   Pulse 85   Temp 97.9 °F (36.6 °C) (Temporal)   Resp 14   Wt (!) 442 lb 3.2 oz (200.6 kg)   LMP 10/30/2021   SpO2 97% Breastfeeding No   BMI 78.33 kg/m²   GEN: No acute distress, cooperative, well nourished, alert. HEENT: PEERLA, EOMI , normocephalic/atraumatic, nares and oropharynx clear. Mucous membranes normal, Tympanic membranes clear bilaterally. Neck: soft, supple, no thyromegaly, mass, no Lymphadenopathy  CV: Regular rate and rhythm, no murmur, rubs, gallops. No edema. Resp: Clear to auscultation bilaterally good air entry bilaterally  No crackles, wheeze. Breathing comfortably. Psych: mood stable, No suicidal thoughts or ideation         ASSESSMENT / PLAN:    1. Morbid obesity ([facility] [address]  Aware risk of persistent weight concerns  Cont to work on improving lifestyle, diet/exercise    2. Pure hypercholesterolemia  Check cmp, lipids    3. Type 2 diabetes mellitus without complication, with long-term current use of insulin (HCC)  Overdue bloodwork  Using CGM and has helped  Continue insulin, trulicity  Check bloodwork and urine as below  F/u 2-3 weeks  - CBC; Future  - Comprehensive Metabolic Panel; Future  - Lipid Panel; Future  - Hemoglobin A1C; Future  - TSH without Reflex; Future  - Microalbumin / Creatinine Urine Ratio; Future    4. Depression with anxiety  Mood doing ok with some breakthru anxiety sx  Discussed tx options. Increase lexapro to 20mg qd  Consider counseling/therapy    5. PCOS (polycystic ovarian syndrome)  stable    6. TAY (obstructive sleep apnea)  Sig overdue for f/u testing, not using machine  Refer to dr Jarrett Michel for eval    7. Vitamin D deficiency  - Vitamin D 25 Hydroxy; Future    8. Class 3 severe obesity with serious comorbidity and body mass index (BMI) greater than or equal to 70 in adult, unspecified obesity type ([facility] [address]  As above    9. Obstructive sleep apnea  - Rico Brewer MD, Pulmonary, [facility]           Follow-up appointment:   2-3 weeks    Discussed use, benefit, and side effects of all prescribed medications. Barriers to medication compliance addressed.   All patient questions answered. Pt voiced understanding. When applicable, patient's outside records were reviewed through [facility]. The patient has signed appropriate paperworks/consents.

## 2021-12-01 LAB
ESTIMATED AVERAGE GLUCOSE: 185.8 MG/DL
HBA1C MFR BLD: 8.1 %

## 2021-12-02 ENCOUNTER — TELEPHONE (OUTPATIENT)
Dept: ENDOCRINOLOGY | Age: 31
End: 2021-12-02

## 2021-12-02 NOTE — TELEPHONE ENCOUNTER
Submitted PA for [employer] SoloStar 300UNIT/ML pen-injectors Key: [id number] - PA Case ID: [id number] - Rx #: [id number]   Via CMM STATUS:    :Denied; Review Type:Prior Auth; Appeal Information: [id number] [id number]; Important - Please read the below note on eAppeals: Please reference the denial letter for information on the rights for an appeal, rationale for the denial, and how to submit an appeal including if any information is needed to support the appeal. Note about urgent situations - Generally, an urgent situation is one which, in the opinion of the provider, the health of the patient may be in serious jeopardy or may experience pain that cannot be adequately controlled while waiting for a decision on the appeal.;

## 2021-12-13 ENCOUNTER — TELEPHONE (OUTPATIENT)
Dept: ENDOCRINOLOGY | Age: 31
End: 2021-12-13

## 2021-12-18 RX ORDER — INSULIN DEGLUDEC 200 U/ML
INJECTION, SOLUTION SUBCUTANEOUS
Qty: 12 PEN | Refills: 2 | Status: SHIPPED | OUTPATIENT
Start: 2021-12-18 | End: 2022-01-13 | Stop reason: SDUPTHER

## 2022-01-13 RX ORDER — INSULIN DEGLUDEC 200 U/ML
INJECTION, SOLUTION SUBCUTANEOUS
Qty: 8 PEN | Refills: 2 | Status: SHIPPED | OUTPATIENT
Start: 2022-01-13 | End: 2022-03-24

## 2022-01-24 ENCOUNTER — OFFICE VISIT (OUTPATIENT)
Dept: FAMILY MEDICINE CLINIC | Age: 32
End: 2022-01-24
Payer: COMMERCIAL

## 2022-01-24 ENCOUNTER — TELEPHONE (OUTPATIENT)
Dept: BARIATRICS/WEIGHT MGMT | Age: 32
End: 2022-01-24

## 2022-01-24 VITALS
TEMPERATURE: 97.6 F | DIASTOLIC BLOOD PRESSURE: 84 MMHG | WEIGHT: 293 LBS | HEART RATE: 91 BPM | RESPIRATION RATE: 16 BRPM | SYSTOLIC BLOOD PRESSURE: 130 MMHG | OXYGEN SATURATION: 98 % | BODY MASS INDEX: 79.36 KG/M2

## 2022-01-24 DIAGNOSIS — F41.8 DEPRESSION WITH ANXIETY: ICD-10-CM

## 2022-01-24 DIAGNOSIS — D23.5 DERMOID CYST OF SKIN OF BACK: ICD-10-CM

## 2022-01-24 DIAGNOSIS — E11.9 TYPE 2 DIABETES MELLITUS WITHOUT COMPLICATION, WITH LONG-TERM CURRENT USE OF INSULIN (HCC): Primary | ICD-10-CM

## 2022-01-24 DIAGNOSIS — Z79.4 TYPE 2 DIABETES MELLITUS WITHOUT COMPLICATION, WITH LONG-TERM CURRENT USE OF INSULIN (HCC): Primary | ICD-10-CM

## 2022-01-24 DIAGNOSIS — E66.01 MORBID OBESITY (HCC): ICD-10-CM

## 2022-01-24 DIAGNOSIS — G47.33 OSA (OBSTRUCTIVE SLEEP APNEA): ICD-10-CM

## 2022-01-24 DIAGNOSIS — J45.909 MILD ASTHMA WITHOUT COMPLICATION, UNSPECIFIED WHETHER PERSISTENT: ICD-10-CM

## 2022-01-24 PROCEDURE — 99214 OFFICE O/P EST MOD 30 MIN: CPT | Performed by: FAMILY MEDICINE

## 2022-01-24 RX ORDER — BUSPIRONE HYDROCHLORIDE 5 MG/1
5 TABLET ORAL 2 TIMES DAILY
Qty: 60 TABLET | Refills: 5 | Status: SHIPPED | OUTPATIENT
Start: 2022-01-24 | End: 2022-02-23

## 2022-01-24 RX ORDER — CEPHALEXIN 500 MG/1
500 CAPSULE ORAL 3 TIMES DAILY
Qty: 21 CAPSULE | Refills: 0 | Status: SHIPPED | OUTPATIENT
Start: 2022-01-24 | End: 2022-01-31

## 2022-01-24 ASSESSMENT — PATIENT HEALTH QUESTIONNAIRE - PHQ9
8. MOVING OR SPEAKING SO SLOWLY THAT OTHER PEOPLE COULD HAVE NOTICED. OR THE OPPOSITE, BEING SO FIGETY OR RESTLESS THAT YOU HAVE BEEN MOVING AROUND A LOT MORE THAN USUAL: 0
SUM OF ALL RESPONSES TO PHQ QUESTIONS 1-9: 19
SUM OF ALL RESPONSES TO PHQ QUESTIONS 1-9: 19
1. LITTLE INTEREST OR PLEASURE IN DOING THINGS: 2
2. FEELING DOWN, DEPRESSED OR HOPELESS: 2
SUM OF ALL RESPONSES TO PHQ QUESTIONS 1-9: 19
SUM OF ALL RESPONSES TO PHQ9 QUESTIONS 1 & 2: 4
7. TROUBLE CONCENTRATING ON THINGS, SUCH AS READING THE NEWSPAPER OR WATCHING TELEVISION: 3
10. IF YOU CHECKED OFF ANY PROBLEMS, HOW DIFFICULT HAVE THESE PROBLEMS MADE IT FOR YOU TO DO YOUR WORK, TAKE CARE OF THINGS AT HOME, OR GET ALONG WITH OTHER PEOPLE: 2
9. THOUGHTS THAT YOU WOULD BE BETTER OFF DEAD, OR OF HURTING YOURSELF: 0
5. POOR APPETITE OR OVEREATING: 3
4. FEELING TIRED OR HAVING LITTLE ENERGY: 3
SUM OF ALL RESPONSES TO PHQ QUESTIONS 1-9: 19
6. FEELING BAD ABOUT YOURSELF - OR THAT YOU ARE A FAILURE OR HAVE LET YOURSELF OR YOUR FAMILY DOWN: 3
3. TROUBLE FALLING OR STAYING ASLEEP: 3

## 2022-01-24 NOTE — TELEPHONE ENCOUNTER
Called as a new pt courtesy call - left message. Told patient to have new pt paperwork completely filled out, insurance card, and id and to arrive on time to appointment. If they didn't have the paperwork filled out and arrive on time may be rescheduled. Also stated if they didn't receive paperwork to let us know so we could get it to them another way. Left office number on message. Told to arrive @ 7 @ [place].

## 2022-01-24 NOTE — PROGRESS NOTES
Here for f/u and recheck of blood sugars, weight    Pt states that she feels things are doing ok. Pt continues to work, does enjoy the work and is keeping busy. Pt feels that lifestyle is doing ok, blood sugars are doing well overall. Pt does track her blood sugars closely using CGM and it does very well to keep track of her fluctuations. Pt changes every 2 week. Recent sensor did fall off and she did not have testing strips to check. Pt continues with tresiba 140 units and humalog on SSI, between 18-28 units depending on sugar reading. Pt does go through a pen of tresiba every 3 days    Pt is set up to have appt with dr. Tony Muhammad to discuss bariatric surgery. Pt is concerned with the thought of surgery but is willing to consider and start the process    Pt states that she is not doing great with mood, does feel that the lexapro increase is helping but still with moderate sx severity. Pt feels overwhelmed with some sx of anxiety, being around a lot of people. Pt does have some moderate social anxiety. Pt has tried counseling in the past and did find it helpful, but states that after a while it weaned off its effectiveness. Except as noted above in the history of present illness, the review of systems is  negative for headache, vision changes, chest pain, shortness of breath, abdominal pain, urinary sx, bowel changes. Past medical, surgical, and social history reviewed and updated  Medications and allergies reviewed and updated      O: /84   Pulse 91   Temp 97.6 °F (36.4 °C) (Temporal)   Resp 16   Wt (!) 448 lb (203.2 kg)   LMP 12/31/2021   SpO2 98%   BMI 79.36 kg/m²   GEN: No acute distress, cooperative, well nourished, alert. HEENT: PEERLA, EOMI , normocephalic/atraumatic, nares and oropharynx clear. Mucous membranes normal, Tympanic membranes clear bilaterally. Neck: soft, supple, no thyromegaly, mass, no Lymphadenopathy  CV: Regular rate and rhythm, no murmur, rubs, gallops.  No edema.  Resp: Clear to auscultation bilaterally good air entry bilaterally  No crackles, wheeze. Breathing comfortably. Psych: mild dysthymia but moderate anxiety. No suicidal thoughts or ideation       Current Outpatient Medications   Medication Sig Dispense Refill    busPIRone (BUSPAR) 5 MG tablet Take 1 tablet by mouth 2 times daily 60 tablet 5    cephALEXin (KEFLEX) 500 MG capsule Take 1 capsule by mouth 3 times daily for 7 days 21 capsule 0    Insulin Degludec (TRESIBA FLEXTOUCH) 200 UNIT/ML SOPN 140 units daily SC 8 pen 2    lisinopril (PRINIVIL;ZESTRIL) 10 MG tablet Take 10 mg by mouth daily      escitalopram (LEXAPRO) 20 MG tablet Take 1 tablet by mouth daily 30 tablet 5    Continuous Blood Gluc Sensor (FREESTYLE SHARDA 2 SENSOR) MISC Every 2 week as needed 2 each 3    insulin lispro, 1 Unit Dial, (HUMALOG KWIKPEN) 100 UNIT/ML SOPN INJECT 20-24  UNITS SUBCUTANEOUSLY BEFORE MEALS 3 TIMES DAILY 10 pen 5    Dulaglutide (TRULICITY) 3 VT/4.8YJ SOPN Inject 3 mg into the skin once a week 13 pen 2    Norgestim-Eth Estrad Triphasic 0.18/0.215/0.25 MG-35 MCG TABS TAKE ONE TABLET BY MOUTH DAILY 28 tablet 11    albuterol sulfate  (90 Base) MCG/ACT inhaler Inhale 2 puffs into the lungs every 6 hours as needed for wheezing 1 Inhaler 1    montelukast (SINGULAIR) 10 MG tablet Take 1 tablet by mouth nightly 30 tablet 5    Continuous Blood Gluc  (FREESTYLE SHARDA 2 READER) JERICA As needed 1 each 0    ondansetron (ZOFRAN) 8 MG tablet Take 1 tablet by mouth every 8 hours as needed for Nausea 24 tablet 1    cyclobenzaprine (FLEXERIL) 10 MG tablet Take 1 po q 8 hours prn low back pain/ tension headaches. 45 tablet 2    Cholecalciferol (VITAMIN D) 50 MCG (2000 UT) CAPS capsule Take by mouth      Insulin Pen Needle (B-D UF III MINI PEN NEEDLES) 31G X 5 MM MISC 4 times a day 150 each 6     No current facility-administered medications for this visit.          Orders Only on 11/30/2021   Component Date Value Ref Range Status    Vit D, 25-Hydroxy 11/30/2021 25.1* >=30 ng/mL Final    Comment: <=20 ng/mL. ........... Leeann Kid Deficient  21-29 ng/mL. ......... Leeann Kid Insufficient  >=30 ng/mL. ........ Leeann Kid Sufficient      Microalbumin, Random Urine 11/30/2021 <1.20  <2.0 mg/dL Final    Creatinine, Ur 11/30/2021 165.6  28.0 - 259.0 mg/dL Final    Microalbumin Creatinine Ratio 11/30/2021 see below  0.0 - 30.0 mg/g Final    Comment: Ratio cannot be calculated since microalbumin level is below  the lower detection limit.  TSH 11/30/2021 3.04  0.27 - 4.20 uIU/mL Final    Hemoglobin A1C 11/30/2021 8.1  See comment % Final    Comment: Comment:  Diagnosis of Diabetes: > or = 6.5%  Increased risk of diabetes (Prediabetes): 5.7-6.4%  Glycemic Control: Nonpregnant Adults: <7.0%                    Pregnant: <6.0%        eAG 11/30/2021 185.8  mg/dL Final    Cholesterol, Total 11/30/2021 192  0 - 199 mg/dL Final    Triglycerides 11/30/2021 101  0 - 150 mg/dL Final    HDL 11/30/2021 62* 40 - 60 mg/dL Final    LDL Calculated 11/30/2021 110* <100 mg/dL Final    VLDL Cholesterol Calculated 11/30/2021 20  Not Established mg/dL Final    Sodium 11/30/2021 136  136 - 145 mmol/L Final    Potassium 11/30/2021 4.3  3.5 - 5.1 mmol/L Final    Chloride 11/30/2021 96* 99 - 110 mmol/L Final    CO2 11/30/2021 26  21 - 32 mmol/L Final    Anion Gap 11/30/2021 14  3 - 16 Final    Glucose 11/30/2021 122* 70 - 99 mg/dL Final    BUN 11/30/2021 9  7 - 20 mg/dL Final    CREATININE 11/30/2021 <0.5* 0.6 - 1.1 mg/dL Final    GFR Non- 11/30/2021 >60  >60 Final    Comment: >60 mL/min/1.73m2 EGFR, calc. for ages 25 and older using the  MDRD formula (not corrected for weight), is valid for stable  renal function.  GFR  11/30/2021 >60  >60 Final    Comment: Chronic Kidney Disease: less than 60 ml/min/1.73 sq.m. Kidney Failure: less than 15 ml/min/1.73 sq.m. Results valid for patients 18 years and older.       Calcium persistent  Stable w/o flar           Follow-up appointment:   1 month s/p start of buspar    Discussed use, benefit, and side effects of all prescribed medications. Barriers to medication compliance addressed. All patient questions answered. Pt voiced understanding. When applicable, patient's outside records were reviewed through [facility]. The patient has signed appropriate paperworks/consents.

## 2022-01-26 ENCOUNTER — OFFICE VISIT (OUTPATIENT)
Dept: BARIATRICS/WEIGHT MGMT | Age: 32
End: 2022-01-26
Payer: COMMERCIAL

## 2022-01-26 VITALS
DIASTOLIC BLOOD PRESSURE: 79 MMHG | HEIGHT: 63 IN | WEIGHT: 293 LBS | SYSTOLIC BLOOD PRESSURE: 131 MMHG | BODY MASS INDEX: 51.91 KG/M2 | HEART RATE: 89 BPM

## 2022-01-26 DIAGNOSIS — E66.01 MORBID OBESITY WITH BMI OF 70 AND OVER, ADULT (HCC): Primary | ICD-10-CM

## 2022-01-26 DIAGNOSIS — Z79.4 TYPE 2 DIABETES MELLITUS WITHOUT COMPLICATION, WITH LONG-TERM CURRENT USE OF INSULIN (HCC): ICD-10-CM

## 2022-01-26 DIAGNOSIS — Z01.818 PRE-OPERATIVE CLEARANCE: ICD-10-CM

## 2022-01-26 DIAGNOSIS — E11.9 TYPE 2 DIABETES MELLITUS WITHOUT COMPLICATION, WITH LONG-TERM CURRENT USE OF INSULIN (HCC): ICD-10-CM

## 2022-01-26 DIAGNOSIS — G47.33 OSA (OBSTRUCTIVE SLEEP APNEA): ICD-10-CM

## 2022-01-26 PROCEDURE — 99204 OFFICE O/P NEW MOD 45 MIN: CPT | Performed by: SURGERY

## 2022-01-26 NOTE — PROGRESS NOTES
Stephanie Velasco is a 28 y.o. female with a date of birth of 1990. Vitals:    01/26/22 0727   BP: 131/79   Pulse: 89    BMI: Body mass index is 80.63 kg/m². Obesity Classification: Class III    Weight History: Wt Readings from Last 3 Encounters:   01/26/22 (!) 448 lb (203.2 kg)   01/24/22 (!) 448 lb (203.2 kg)   11/30/21 (!) 442 lb 4.8 oz (200.6 kg)       Patient's lowest adult weight was 410 lbs at age 26. Pt reports has not been under 400 since ~ age 17yrs. Patient's highest adult weight was 460 lbs at age 23. Patient has participated in the following weight loss programs: Atkins Diet, Weight Watcher Anonymous, counseling with RD, diet workshop, LF, LC keto, grapefruit, and kcal restriction. Patient has participated in meal replacement/liquid diets - Slimfast.  Patient has not participated in weight loss medications. Pt reports had some success with [employer] d/t accountability and tracking. Patient is not lactose intolerant. Patient does not have Evangelical/cultural food concerns. Patient does not have food allergies. Patient does tolerate artificial sweeteners. Pt reports poor sleep. TAY, pt does not currently have a sleep device - has an appointment with sleep medicine in February. During week pt eats 4-5x day, but on weekend pt eats 3 meals + 3 eats. Pt's parents doing cooking and shopping at home, does eat fast food frequently. 24 hour recall/food frequency chart:  Wakes 6a   Breakfast: yes. 8a 12-14 mini sausage patties or links / breakfast later on weekends 10:20a [employer] - 2 sausage, 2 egg, 2 cheese biscuit + 2 hash browns   Snack: sometimes - powdered donuts / movie theatre butter popcorn  Lunch: yes. - 1-2p 10 wings with BBQ sauce / skyline - chix barrera ranch salad + chili cheese fries / school cafeteria - salad + soup + banana + entree (white lasagna / chix jessica / chix wrap)  Snack: sometimes - 3:30p salad with cheese + 2 packs Ranch / [employer]  Dinner: yes.  - protein + sizes / 10oz ground beef with spaghetti and shredded cheese / Taty Daniel [place] Burritos - brown rice + triple ground beef + cheese + onions + ranch  Snack: sometimes - 12 double stuffed oreos / pint ice cream  Drinks throughout the day: 120oz water / 3 bottles coke daily / 5-6 cans coke zero daily  Do you drink alcohol? Yes. How often/how much alcohol do you drink: 2 drinks per month. Exercise: [place] & swim 1-2x week    Patient does meet the criteria for binge eating disorder. Patient does have grazing. Patient does have night eating. Patient does have a history of emotional eating or eating out of boredom. Surgery  Patient does feel confident in her ability to make these changes. The patient's expectations of post-surgical eating habits are reasonable. Patient states she does understand the consequences of not complying with post-op food guidelines. Patient states she does understands the long term changes in food intake that will be necessary for all occasions after surgery for the rest of her life. Patient is deemed nutritionally appropriate to proceed. Goals  Weight: 240-300#  Health Improvement: improved mobility, improved breathing, improve DMT2 and BP, reduce medication, overall health    Assessment  Nutritional Needs: RMR=(9.99 x 203.2) + (6.25 x 158.8) - (4.92 x 31 y.o.) -161  = 2713.5 kcal x 1.4 (light / sednetary activity factor)= 3798 kcal - 1000 (for 2 lb weight loss/week)= 2798 kcal.    Plan  Plan/Recommendations: Start presurgical guidelines. Goals:   -Eat 4-5 times daily  -Avoid high fat and high sugar foods  -Include protein with all meals and snacks  -Avoid carbonation and caffeine  -Avoid calorie containing beverages  -Increase physical activity as tolerated    PES Statement:  Overweight/Obesity related to lack of exercise, sedentary lifestyle, unhealthy eating habits, and unsuccessful diet attempts as evidenced by BMI. Body mass index is 80.63 kg/m².     Will follow up as necessary.     Joyce Encinas RD, LD

## 2022-01-26 NOTE — PROGRESS NOTES
[facility]) Physicians   General & Laparoscopic Surgery  [employer]      HPI:    Sha Dent is a very pleasant 28 y.o. obese female ,   Body mass index is 80.63 kg/m². And multiple medical problems who is presenting for weight loss surgery evaluation and consultation by     Patient has been struggling for several years now with obesity. Patient feels the weight is an obstacle to achieve and perform things in daily living as well risk on health. Tries to diet, and exercise but can't keep the weight off. Patient tried other regimens, but with no sustainable weight loss. Patient  is very determined to lose weight and be healthy, and is interested in surgical weight loss to achieve this goal.    Otherwise patient denies any nausea, vomiting, fevers, chills, shortness of breath, chest pain, constipation or urinary symptoms.         Pain Assessment   Denies any abdominal pain     Past Medical History:   Diagnosis Date    Acne     Allergic rhinitis     Anxiety     Asthma     Cellulitis     on back/ on bactrim for/ pcp aware    COVID-19 12/2020    Depression     Diabetes mellitus type 1 ([facility] [address] 6/06    Folic acid deficiency     Hyperlipidemia     Morbid obesity with BMI of 70 and over, adult ([facility] [address]     Obesity     TAY (obstructive sleep apnea) 7/07    PCOS (polycystic ovarian syndrome)     Pre-operative clearance     Pyelonephritis 12/08    left    Vitamin D deficiency      Past Surgical History:   Procedure Laterality Date    CHOLECYSTECTOMY  1/15/2013    MULTIPORT ROBOTIC ASSISTED LAPAROSCOPIC CHOLECYSTECTOMY    CYST REMOVAL      OTHER SURGICAL HISTORY  2009    Cyst/Mass excised from posterior neck    OTHER SURGICAL HISTORY  12/15    excision back lesion-Dr. Francisca Garner    WISDOM TOOTH EXTRACTION       Family History   Problem Relation Age of Onset    Diabetes Mother     Diabetes Father     High Blood Pressure Father     Asthma Father     Hypertension Father    [place] Elevated Lipids Father     Prostate Cancer Father      Social History     Tobacco Use    Smoking status: Never Smoker    Smokeless tobacco: Never Used   Substance Use Topics    Alcohol use: Yes     Alcohol/week: 0.0 standard drinks     Types: 1 Standard drinks or equivalent per week     Comment: social     I counseled the patient on the importance of not smoking and risks of ETOH. Allergies   Allergen Reactions    No Known Drug Allergy      Vitals:    01/26/22 0727   BP: 131/79   Pulse: 89   Weight: (!) 448 lb (203.2 kg)   Height: 5' 2.5\" (1.588 m)       Body mass index is 80.63 kg/m².       Current Outpatient Medications:     busPIRone (BUSPAR) 5 MG tablet, Take 1 tablet by mouth 2 times daily, Disp: 60 tablet, Rfl: 5    cephALEXin (KEFLEX) 500 MG capsule, Take 1 capsule by mouth 3 times daily for 7 days, Disp: 21 capsule, Rfl: 0    Insulin Degludec (TRESIBA FLEXTOUCH) 200 UNIT/ML SOPN, 140 units daily SC, Disp: 8 pen, Rfl: 2    lisinopril (PRINIVIL;ZESTRIL) 10 MG tablet, Take 10 mg by mouth daily, Disp: , Rfl:     escitalopram (LEXAPRO) 20 MG tablet, Take 1 tablet by mouth daily, Disp: 30 tablet, Rfl: 5    Continuous Blood Gluc Sensor (FREESTYLE SHARDA 2 SENSOR) MISC, Every 2 week as needed, Disp: 2 each, Rfl: 3    insulin lispro, 1 Unit Dial, (HUMALOG KWIKPEN) 100 UNIT/ML SOPN, INJECT 20-24  UNITS SUBCUTANEOUSLY BEFORE MEALS 3 TIMES DAILY, Disp: 10 pen, Rfl: 5    Dulaglutide (TRULICITY) 3 CU/0.0RW SOPN, Inject 3 mg into the skin once a week, Disp: 13 pen, Rfl: 2    Norgestim-Eth Estrad Triphasic 0.18/0.215/0.25 MG-35 MCG TABS, TAKE ONE TABLET BY MOUTH DAILY, Disp: 28 tablet, Rfl: 11    albuterol sulfate  (90 Base) MCG/ACT inhaler, Inhale 2 puffs into the lungs every 6 hours as needed for wheezing, Disp: 1 Inhaler, Rfl: 1    montelukast (SINGULAIR) 10 MG tablet, Take 1 tablet by mouth nightly, Disp: 30 tablet, Rfl: 5    Continuous Blood Gluc  (FREESTYLE SHARDA 2 READER) JERICA As and no edema present. No thyromegaly present. Cardiovascular: Normal rate, regular rhythm, normal heart sounds, intact distal pulses and normal pulses. Pulmonary/Chest: Effort normal and breath sounds normal. No stridor. No respiratory distress. Patient  has no wheezes. Patient has no rales. Patient exhibits no tenderness and no crepitus. Abdominal: Soft. Normal appearance and bowel sounds are normal. Patient exhibits no distension, no abdominal bruit, no ascites and no mass. There is no hepatosplenomegaly. There is no tenderness. There is no rigidity, no rebound, no guarding and no CVA tenderness. No hernia. Hernia confirmed negative in the ventral area. Musculoskeletal: Normal range of motion. Patient exhibits no edema or tenderness. Lymphadenopathy:        Head (right side): No submental, no submandibular, no preauricular, no posterior auricular and no occipital adenopathy present. Head (left side): No submental, no submandibular, no preauricular, no posterior auricular and no occipital adenopathy present. Patient  has no cervical adenopathy. Right: No supraclavicular adenopathy present. Left: No supraclavicular adenopathy present. Neurological: Patient is alert and oriented to person, place, and time. Patient has normal strength. Coordination and gait normal. GCS eye subscore is 4. GCS verbal subscore is 5. GCS motor subscore is 6. Skin: Skin is warm and dry. No abrasion and no rash noted. Patient  is not diaphoretic. No cyanosis or erythema. Psychiatric: Patient has a normal mood and affect. speech is normal and behavior is normal. Cognition and memory are normal.             A/P  Ofelia Concepcion is a very pleasant 28 y.o. female with Obesity,  Body mass index is 80.63 kg/m². and multiple obesity related co-morbidities. Ofelia Concepcion is very motivated to lose weight and being more healthy.    We discussed how her weight affects her overall health including:  Ashleigh Doherty was seen today for bariatric, initial visit. Diagnoses and all orders for this visit:    Morbid obesity with BMI of 70 and over, adult ([facility] [address]    Pre-operative clearance    TAY (obstructive sleep apnea)    Type 2 diabetes mellitus without complication, with long-term current use of insulin ([facility] [address]      The patient underwent 30 minutes of dietary counseling. I have reviewed, discussed and agree with the dietary plan. Medical weight loss and different surgical options were discussed in details with patient. Nilda Holley is interested in surgical weight loss. Patient is interested in Laparoscopic Sleeve Gastrectomy, which I believe is an excellent option for the patient. We will proceed with pre-operative work up labs and studies. Will also petition patient's  insurance for approval for this procedure. Patient received dietary handouts and education. Patient advised that its their responsibility to follow up for studies and/or labs ordered today. Also discussed in details the importance of follow up, as well following the recommendations and completing the whole program to improve outcomes when it comes to healthier lifestyle as well weight loss. Patient also advised about risks and benefits being on a strict dietary regimen as well using supplements. Patient agrees and wants to proceed with weight loss planning     Labs ordered. Cardiac Clearance. Sleep Clearance. Psych Evaluation. H-pylori   EGD  Support Group. PCP Letter. Weight History    F/U in 4 weeks. Referred to [facility] with goal of getting BMI < 65. Will f/u in 6 months    Patient advised that its their responsibility to follow up for studies and/or labs ordered today.

## 2022-02-02 ENCOUNTER — TELEPHONE (OUTPATIENT)
Dept: PULMONOLOGY | Age: 32
End: 2022-02-02

## 2022-02-03 ENCOUNTER — VIRTUAL VISIT (OUTPATIENT)
Dept: PULMONOLOGY | Age: 32
End: 2022-02-03
Payer: COMMERCIAL

## 2022-02-03 DIAGNOSIS — Z79.4 CONTROLLED TYPE 2 DIABETES MELLITUS WITH COMPLICATION, WITH LONG-TERM CURRENT USE OF INSULIN (HCC): Chronic | ICD-10-CM

## 2022-02-03 DIAGNOSIS — E66.01 MORBID OBESITY WITH BMI OF 70 AND OVER, ADULT (HCC): Chronic | ICD-10-CM

## 2022-02-03 DIAGNOSIS — G47.33 OBSTRUCTIVE SLEEP APNEA SYNDROME: Primary | ICD-10-CM

## 2022-02-03 DIAGNOSIS — E28.2 PCOS (POLYCYSTIC OVARIAN SYNDROME): Chronic | ICD-10-CM

## 2022-02-03 DIAGNOSIS — E11.8 CONTROLLED TYPE 2 DIABETES MELLITUS WITH COMPLICATION, WITH LONG-TERM CURRENT USE OF INSULIN (HCC): Chronic | ICD-10-CM

## 2022-02-03 DIAGNOSIS — F41.8 DEPRESSION WITH ANXIETY: Chronic | ICD-10-CM

## 2022-02-03 PROCEDURE — 99204 OFFICE O/P NEW MOD 45 MIN: CPT | Performed by: INTERNAL MEDICINE

## 2022-02-03 ASSESSMENT — SLEEP AND FATIGUE QUESTIONNAIRES
HOW LIKELY ARE YOU TO NOD OFF OR FALL ASLEEP WHILE WATCHING TV: 1
HOW LIKELY ARE YOU TO NOD OFF OR FALL ASLEEP WHEN YOU ARE A PASSENGER IN A CAR FOR AN HOUR WITHOUT A BREAK: 3
HOW LIKELY ARE YOU TO NOD OFF OR FALL ASLEEP WHILE SITTING INACTIVE IN A PUBLIC PLACE: 1
HOW LIKELY ARE YOU TO NOD OFF OR FALL ASLEEP WHILE SITTING AND TALKING TO SOMEONE: 1
HOW LIKELY ARE YOU TO NOD OFF OR FALL ASLEEP WHILE SITTING AND READING: 3
HOW LIKELY ARE YOU TO NOD OFF OR FALL ASLEEP IN A CAR, WHILE STOPPED FOR A FEW MINUTES IN TRAFFIC: 0
ESS TOTAL SCORE: 14
HOW LIKELY ARE YOU TO NOD OFF OR FALL ASLEEP WHILE LYING DOWN TO REST IN THE AFTERNOON WHEN CIRCUMSTANCES PERMIT: 3
HOW LIKELY ARE YOU TO NOD OFF OR FALL ASLEEP WHILE SITTING QUIETLY AFTER LUNCH WITHOUT ALCOHOL: 2

## 2022-02-03 ASSESSMENT — ENCOUNTER SYMPTOMS
RHINORRHEA: 0
VOMITING: 0
ABDOMINAL DISTENTION: 0
PHOTOPHOBIA: 0
CHOKING: 0
ALLERGIC/IMMUNOLOGIC NEGATIVE: 1
APNEA: 1
SHORTNESS OF BREATH: 1
NAUSEA: 0
EYE PAIN: 0
ABDOMINAL PAIN: 0
CHEST TIGHTNESS: 0

## 2022-02-03 NOTE — PROGRESS NOTES
Carmen Mirza MD, [facility], [facility]  [address]co Duggan [address]  [address] [facility], [facility] Reshma Kraft [address] [phone]   [facility] [address]  [place]. [address] [place] [address]. [address] [phone]     Video Visit- Consult    Kelly Hutchinson, was evaluated through a synchronous (real-time) audio-video  encounter. The patient (or guardian if applicable) is aware that this is a billable  service, which includes applicable co-pays. This Virtual Visit was conducted with  patient's (and/or legal guardian's) consent. The visit was conducted pursuant to  the emergency declaration under the [facility], [address] waiver authority and the [employer] Act. Patient identification was verified,  and a caregiver was present when appropriate. The patient was located in a  state where the provider was licensed to provide care. Assessment:      Visit Diagnoses and Associated Orders     Obstructive sleep apnea syndrome   (New Problem)  -  Primary    Needs work up    Sleep Study with PAP Titration [51100 Custom]   - Future Order         Controlled type 2 diabetes mellitus with complication, with long-term current use of insulin (HCC)   (Stable)           Depression with anxiety   (Stable)           PCOS (polycystic ovarian syndrome)   (Stable)           Morbid obesity with BMI of 70 and over, adult ([facility] [address]   (Not Stable)      Sleep Study with PAP Titration [25222 Custom]   - Future Order                Plan:      One or more undiagnosed new problem with uncertain prognosis till final diagnosis is made. Reviewed TAY: pathophysiology, diagnosis, complications and treatment. Instructed her not to drive if drowsy. Continue medications per her PCP and other physicians. Limit caffeine use after 3pm. Given severity of his Sx and medical Hx as well has very high probability for TAY will do split-night to expedite therapy for his TAY. 8 wk f/u after titration. The chronic medical conditions listed are directly related to the primary diagnosis listed above. The management of the primary diagnosis affects the secondary diagnosis and vice versa. Given BMI over 70 is not a candidate for HST. This information was analyzed to assess complexity and medical decision making in regards to further testing and management. Continue meds for: DM, depression/CARITO, and PCOS. Pt would medically benefit from wt loss for TAY (diet, exercise, surgical). Orders Placed This Encounter   Procedures    Sleep Study with PAP Titration          Subjective:     Patient ID: Wei Enriquez is a 28 y.o. female. Chief Complaint   Patient presents with    Sleep Apnea       HPI:      Wei Enriquez is a 28 y.o. female referred by Saravanan Toscano MD for a sleep evaluation. She complains of: snoring, witnessed apneas, excessive daytime sleepiness , non-restorative sleep, waking choking/gasping and tossing and turning at night. She denies: cataplexy and hypnagogic hallucinations. Symptoms began several years ago, gradually worsening since that time. Diagnosed with sleep apnea over 15 years ago when she was a teenager. No old records are available from that time. Patient was tried on CPAP but struggled to wear it due to claustrophobia and she would wake up with the mask on her face so she has not been on therapy ever since. Chronic stable medical conditions: DM, depression/CARITO, and PCOS  Chronic unstable medical conditions: obesity    DOT/CDL - No  FAA/'s license -No    Previous Report(s) Reviewed: historical medical records, office notes, and referral letter(s). Pertinent data has been documented.     [place] - Total score: 14    Social History     Socioeconomic History    Marital status: Single     Spouse name: Not on file    Number of children: Not on file    Years of education: Not on file    Highest education level: Not on file   Occupational History    Not on file   Tobacco Use    Smoking status: Never Smoker    Smokeless tobacco: Never Used   Vaping Use    Vaping Use: Never used   Substance and Sexual Activity    Alcohol use: Yes     Alcohol/week: 0.0 standard drinks     Types: 1 Standard drinks or equivalent per week     Comment: social    Drug use: No    Sexual activity: Never   Other Topics Concern    Not on file   Social History Narrative    Not on file     Social Determinants of Health     Financial Resource Strain: Low Risk     Difficulty of Paying Living Expenses: Not hard at all   Food Insecurity: No Food Insecurity    Worried About [facility] in the Last Year: Never true    [facility] in the Last Year: Never true   Transportation Needs: No Transportation Needs    Lack of Transportation (Medical): No    Lack of Transportation (Non-Medical):  No   Physical Activity:     Days of Exercise per Week: Not on file    Minutes of Exercise per Session: Not on file   Stress:     Feeling of Stress : Not on file   Social Connections:     Frequency of Communication with Friends and Family: Not on file    Frequency of Social Gatherings with Friends and Family: Not on file    Attends Taoism Services: Not on file    Active Member of [address] [place] [address] or Organizations: Not on file    Attends Club or Organization Meetings: Not on file    Marital Status: Not on file   Intimate Partner Violence:     Fear of Current or Ex-Partner: Not on file    Emotionally Abused: Not on file    Physically Abused: Not on file    Sexually Abused: Not on file   Housing Stability:     Unable to Pay for Housing in the Last Year: Not on file    Number of [place] in the Last Year: Not on file    Unstable Housing in the Last Year: Not on file        Current Outpatient Medications   Medication Instructions    albuterol sulfate  (90 Base) MCG/ACT inhaler 2 puffs, Inhalation, EVERY 6 HOURS PRN    busPIRone (BUSPAR) 5 mg, Oral, 2 TIMES DAILY    Continuous Blood Gluc  (FREESTYLE SHARDA 2 READER) JERICA As needed    Continuous Blood Gluc Sensor (FREESTYLE SHARDA 2 SENSOR) MISC Every 2 week as needed    cyclobenzaprine (FLEXERIL) 10 MG tablet Take 1 po q 8 hours prn low back pain/ tension headaches.     escitalopram (LEXAPRO) 20 mg, Oral, DAILY    Insulin Degludec (TRESIBA FLEXTOUCH) 200 UNIT/ML SOPN 140 units daily SC    insulin lispro, 1 Unit Dial, (HUMALOG KWIKPEN) 100 UNIT/ML SOPN INJECT 20-24  UNITS SUBCUTANEOUSLY BEFORE MEALS 3 TIMES DAILY    Insulin Pen Needle (B-D UF III MINI PEN NEEDLES) 31G X 5 MM MISC 4 times a day    lisinopril (PRINIVIL;ZESTRIL) 10 mg, Oral, DAILY    montelukast (SINGULAIR) 10 mg, Oral, NIGHTLY    Norgestim-Eth Estrad Triphasic 0.18/0.215/0.25 MG-35 MCG TABS TAKE ONE TABLET BY MOUTH DAILY    ondansetron (ZOFRAN) 8 mg, Oral, EVERY 8 HOURS PRN    Trulicity 3 mg, SubCUTAneous, WEEKLY       Allergies as of 02/03/2022 - Fully Reviewed 02/03/2022   Allergen Reaction Noted    No known drug allergy  01/05/2011       Patient Active Problem List   Diagnosis    Asthma    Polycystic ovarian disease    Obstructive sleep apnea    Pure hypercholesterolemia    Morbid obesity (HCC)    Allergic rhinitis    Acne    Diabetes mellitus (HCC)    PCOS (polycystic ovarian syndrome)    TAY (obstructive sleep apnea)    Obesity    Folic acid deficiency    Headache    Skin lesion    Major depressive disorder, single episode, mild (HCC)    Mass of subcutaneous tissue of back    Vitamin D deficiency    Depression with anxiety    Controlled type 2 diabetes mellitus with complication, with long-term current use of insulin (HCC)    Pre-operative clearance    Morbid obesity with BMI of 70 and over, adult [facility])       Past Medical History:   Diagnosis Date    Acne     Allergic rhinitis     Anxiety     Asthma     Cellulitis     on back/ on bactrim for/ pcp aware    COVID-19 12/2020    Depression     Diabetes mellitus type 1 ([address] 4/41    Folic acid deficiency     Hyperlipidemia     Morbid obesity with BMI of 70 and over, adult ([facility] [address]     Obesity     Obstructive sleep apnea 1/5/2011    TAY (obstructive sleep apnea) 7/07    PCOS (polycystic ovarian syndrome)     Pre-operative clearance     Pyelonephritis 12/08    left    Vitamin D deficiency        Past Surgical History:   Procedure Laterality Date    CHOLECYSTECTOMY  1/15/2013    MULTIPORT ROBOTIC ASSISTED LAPAROSCOPIC CHOLECYSTECTOMY    CYST REMOVAL      OTHER SURGICAL HISTORY  2009    Cyst/Mass excised from posterior neck    OTHER SURGICAL HISTORY  12/15    excision back lesion-Dr. Steele Dionna    WISDOM TOOTH EXTRACTION         Family History   Problem Relation Age of Onset    Diabetes Mother     Diabetes Father     High Blood Pressure Father     Asthma Father     Hypertension Father     Elevated Lipids Father     Prostate Cancer Father     Sleep Apnea Father        Review of Systems   Constitutional: Positive for fatigue and unexpected weight change. Negative for activity change and appetite change. HENT: Positive for congestion. Negative for nosebleeds, postnasal drip, rhinorrhea and sneezing. Eyes: Negative for photophobia, pain and visual disturbance. Respiratory: Positive for apnea and shortness of breath. Negative for choking and chest tightness. Cardiovascular: Negative. Gastrointestinal: Negative for abdominal distention, abdominal pain, nausea and vomiting. Endocrine: Negative for cold intolerance and heat intolerance. Genitourinary: Positive for frequency. Negative for difficulty urinating, dysuria and urgency. Musculoskeletal: Negative. Negative for neck pain and neck stiffness. Skin: Negative. Allergic/Immunologic: Negative. Neurological: Negative for tremors, seizures, syncope and weakness. Hematological: Negative for adenopathy. Does not bruise/bleed easily. Psychiatric/Behavioral: Positive for sleep disturbance.  Negative for agitation, behavioral problems and confusion. Objective:     Vitals:  Patient reported Height and Weight Calculated BMI   Patient-Reported Vitals 12/1/2020   Patient-Reported Weight 416lb   Patient-Reported Height 5ft2.5in   Patient-Reported Pulse 102   Patient-Reported Temperature 96.1   Patient-Reported SpO2 98       74.9     Due to COVID-19 this was a virtual visit and physical exam was deferred.     Electronically signed by Baltazar Petersen MD on2/3/2022 at 2:11 PM

## 2022-02-03 NOTE — LETTER
[facility] Sleep Medicine  [place] [address] [place] [address]  Phone: [phone]  Fax: [phone]           Agata Clement MD      February 3, 2022     Patient: Meredith Huang   MR Number: [id number]   YOB: 1990   Date of Visit: 2/3/2022       Dear Dr. Stephanie Heredia: Thank you for referring Meredith Huang to me for evaluation/treatment. Below are the relevant portions of my assessment and plan of care. Visit Diagnoses and Associated Orders     Obstructive sleep apnea syndrome   (New Problem)  -  Primary    Needs work up    Sleep Study with PAP Titration [06021 Custom]   - Future Order         Controlled type 2 diabetes mellitus with complication, with long-term current use of insulin (HCC)   (Stable)           Depression with anxiety   (Stable)           PCOS (polycystic ovarian syndrome)   (Stable)           Morbid obesity with BMI of 70 and over, adult ([facility] [address]   (Not Stable)      Sleep Study with PAP Titration [[id number] Custom]   - Future Order               One or more undiagnosed new problem with uncertain prognosis till final diagnosis is made. Reviewed TAY: pathophysiology, diagnosis, complications and treatment. Instructed her not to drive if drowsy. Continue medications per her PCP and other physicians. Limit caffeine use after 3pm. Given severity of his Sx and medical Hx as well has very high probability for TAY will do split-night to expedite therapy for his TAY. 8 wk f/u after titration. The chronic medical conditions listed are directly related to the primary diagnosis listed above. The management of the primary diagnosis affects the secondary diagnosis and vice versa. Given BMI over 70 is not a candidate for HST. This information was analyzed to assess complexity and medical decision making in regards to further testing and management. Continue meds for: DM, depression/CARITO, and PCOS.  Pt would medically benefit from wt loss for TAY (diet, exercise, surgical). Orders Placed This Encounter   Procedures    Sleep Study with PAP Titration       If you have questions, please do not hesitate to call me. I look forward to following Reshma Thompson along with you.     Sincerely,        Alejandra Narvaez MD    CC providers:  Jerald Lala MD  [address] [place] [id number]  [address], [address]  [place] [address] [facility]

## 2022-02-08 ENCOUNTER — TELEPHONE (OUTPATIENT)
Dept: SLEEP CENTER | Age: 32
End: 2022-02-08

## 2022-02-08 NOTE — TELEPHONE ENCOUNTER
Called to schedule a split night per Primitivo Tobar  for the pt to return my call     [facility] [employer]  [facility]

## 2022-02-12 ENCOUNTER — PATIENT MESSAGE (OUTPATIENT)
Dept: FAMILY MEDICINE CLINIC | Age: 32
End: 2022-02-12

## 2022-02-12 ENCOUNTER — TELEMEDICINE (OUTPATIENT)
Dept: BARIATRICS/WEIGHT MGMT | Age: 32
End: 2022-02-12
Payer: COMMERCIAL

## 2022-02-12 DIAGNOSIS — Z71.3 DIETARY COUNSELING AND SURVEILLANCE: ICD-10-CM

## 2022-02-12 DIAGNOSIS — E66.01 MORBID OBESITY WITH BMI OF 70 AND OVER, ADULT (HCC): Primary | ICD-10-CM

## 2022-02-12 DIAGNOSIS — Z79.4 TYPE 2 DIABETES MELLITUS WITHOUT COMPLICATION, WITH LONG-TERM CURRENT USE OF INSULIN (HCC): ICD-10-CM

## 2022-02-12 DIAGNOSIS — E11.9 TYPE 2 DIABETES MELLITUS WITHOUT COMPLICATION, WITH LONG-TERM CURRENT USE OF INSULIN (HCC): ICD-10-CM

## 2022-02-12 PROCEDURE — 99204 OFFICE O/P NEW MOD 45 MIN: CPT | Performed by: FAMILY MEDICINE

## 2022-02-12 ASSESSMENT — ENCOUNTER SYMPTOMS
CHEST TIGHTNESS: 0
NAUSEA: 0
EYE PAIN: 0
VOMITING: 0
ABDOMINAL DISTENTION: 0
PHOTOPHOBIA: 0
COUGH: 0
CHOKING: 0
SHORTNESS OF BREATH: 0
APNEA: 0
ABDOMINAL PAIN: 0
DIARRHEA: 0
CONSTIPATION: 0
BLOOD IN STOOL: 0
WHEEZING: 0

## 2022-02-12 NOTE — PROGRESS NOTES
Patient: Mckenzie Griffith     Encounter Date: 2/12/2022    YOB: 1990               Age: 28 y.o. Patient identification was verified at the start of the visit. Patient-Reported Vitals 2/12/2022   Patient-Reported Weight 433.8   Patient-Reported Height 53   Patient-Reported Pulse -   Patient-Reported Temperature 95.9   Patient-Reported SpO2 84/98         BP Readings from Last 1 Encounters:   01/26/22 131/79       BMI Readings from Last 1 Encounters:   01/26/22 80.63 kg/m²       Pulse Readings from Last 1 Encounters:   01/26/22 89                                              Wt Readings from Last 3 Encounters:   01/26/22 (!) 448 lb (203.2 kg)   01/24/22 (!) 448 lb (203.2 kg)   11/30/21 (!) 442 lb 4.8 oz (200.6 kg)     Chief Complaint   Patient presents with    Bariatric, Initial Visit     MWM- NP       HPI:    28 y.o. female presents to establish care via video visit. She was referred by Dr. Chanelle Mercedes for medical weight managment to help lower her BMI <65 prior to proceeding with sleeve gastrectomy. The patient's medical history is significant for class III obesity. The patient has a long-standing history of obesity which started gradually. The problem is severe. The patient has been gaining weight. Risk factors include annual weight gain of >2 lbs (1 kg)/ year and sedentary lifestyle. Aggravating factors include poor diet and lack of physical activity. The patient has tried various diet/exercise plans which have been ineffective in the long-run. She is motivated to start losing weight to help imprive her overall health. TAY since age 14   Was on CPAP  Currently not on tx  Has upcoming sleep study   Diabetes managed by endo- Dr. Talbot Lung   Last HbA1c 8.1 (11/21)  Compliant with meds       When did you become overweight? [x] Childhood   [] Teens   [] Adulthood   [] Pregnancy   [] Menopause    Highest adult weight: ~450 pounds     Triggers for weight gain?    [] Stress   [] Illness   [] Medications   [] Travel  []Injury     [] Nightshift work   [] Insomnia  [x] No specific triggers   [] Other    Food triggers:   [x] Stress   [x] Boredom   [x] Fast food   [x] Eating out   [] Seeking reward   [] Social     Have you ever taken medications to help you lose weight? [] Yes  [x] No    Have you ever been on a meal replacement program?  [] Yes  [x] No    How many hours of sleep do you get each night?  [] <6 hours  [x] 6-8 hours  [] >8 hours    Do you have sleep apnea? [x] Yes  [] No   [] Unknown       The patient denies any significant cardiac or psychiatric disease. The patient denies a history thyroid disease. The patient denies a history of glaucoma. The patient denies a history of seizure disorders/epiliepsy. Dietitian's assessment reviewed and addressed with patient. Reviewed:  [x] Nutrition and the importance of regular protein intake  [x] Hidden CHO/carbohydrate sources  [x] Alcohol use  [x] Tobacco use  [x] Drug use- Denies   [x] Importance of exercise and reducing sedentary time        Controlled Substance Monitoring:     RX Monitoring 11/15/2017   Attestation The Prescription Monitoring Report for this patient was reviewed today. Periodic Controlled Substance Monitoring Possible medication side effects, risk of tolerance and/or dependence, and alternative treatments discussed. ;No signs of potential drug abuse or diversion identified.         Allergies   Allergen Reactions    No Known Drug Allergy          Current Outpatient Medications:     busPIRone (BUSPAR) 5 MG tablet, Take 1 tablet by mouth 2 times daily, Disp: 60 tablet, Rfl: 5    Insulin Degludec (TRESIBA FLEXTOUCH) 200 UNIT/ML SOPN, 140 units daily SC, Disp: 8 pen, Rfl: 2    lisinopril (PRINIVIL;ZESTRIL) 10 MG tablet, Take 10 mg by mouth daily, Disp: , Rfl:     escitalopram (LEXAPRO) 20 MG tablet, Take 1 tablet by mouth daily, Disp: 30 tablet, Rfl: 5    Continuous Blood Gluc Sensor (FREESTYLE SHARDA 2 SENSOR) [facility], Every 2 week as needed, Disp: 2 each, Rfl: 3    insulin lispro, 1 Unit Dial, (HUMALOG KWIKPEN) 100 UNIT/ML SOPN, INJECT 20-24  UNITS SUBCUTANEOUSLY BEFORE MEALS 3 TIMES DAILY, Disp: 10 pen, Rfl: 5    Dulaglutide (TRULICITY) 3 BD/0.8CQ SOPN, Inject 3 mg into the skin once a week, Disp: 13 pen, Rfl: 2    Norgestim-Eth Estrad Triphasic 0.18/0.215/0.25 MG-35 MCG TABS, TAKE ONE TABLET BY MOUTH DAILY, Disp: 28 tablet, Rfl: 11    albuterol sulfate  (90 Base) MCG/ACT inhaler, Inhale 2 puffs into the lungs every 6 hours as needed for wheezing, Disp: 1 Inhaler, Rfl: 1    montelukast (SINGULAIR) 10 MG tablet, Take 1 tablet by mouth nightly, Disp: 30 tablet, Rfl: 5    Continuous Blood Gluc  (FREESTYLE SHARDA 2 READER) [place], As needed, Disp: 1 each, Rfl: 0    ondansetron (ZOFRAN) 8 MG tablet, Take 1 tablet by mouth every 8 hours as needed for Nausea, Disp: 24 tablet, Rfl: 1    cyclobenzaprine (FLEXERIL) 10 MG tablet, Take 1 po q 8 hours prn low back pain/ tension headaches., Disp: 45 tablet, Rfl: 2    Insulin Pen Needle (B-D UF III MINI PEN NEEDLES) 31G X 5 MM MISC, 4 times a day, Disp: 150 each, Rfl: 6    Patient Active Problem List   Diagnosis    Asthma    Polycystic ovarian disease    Obstructive sleep apnea    Pure hypercholesterolemia    Morbid obesity (HCC)    Allergic rhinitis    Acne    Diabetes mellitus (HCC)    PCOS (polycystic ovarian syndrome)    TAY (obstructive sleep apnea)    Obesity    Folic acid deficiency    Headache    Skin lesion    Major depressive disorder, single episode, mild (HCC)    Mass of subcutaneous tissue of back    Vitamin D deficiency    Depression with anxiety    Controlled type 2 diabetes mellitus with complication, with long-term current use of insulin (HCC)    Pre-operative clearance    Morbid obesity with BMI of 70 and over, adult [facility])       Past Medical History:   Diagnosis Date    Acne     Allergic rhinitis     Anxiety     Asthma     Cellulitis     on back/ on bactrim for/ pcp aware    COVID-19 12/2020    Depression     Diabetes mellitus type 1 ([facility] [address] 6/76    Folic acid deficiency     Hyperlipidemia     Morbid obesity with BMI of 70 and over, adult ([facility] [address]     Obesity     Obstructive sleep apnea 1/5/2011    TAY (obstructive sleep apnea) 7/07    PCOS (polycystic ovarian syndrome)     Pre-operative clearance     Pyelonephritis 12/08    left    Vitamin D deficiency        Past Surgical History:   Procedure Laterality Date    CHOLECYSTECTOMY  1/15/2013    MULTIPORT ROBOTIC ASSISTED LAPAROSCOPIC CHOLECYSTECTOMY    CYST REMOVAL      OTHER SURGICAL HISTORY  2009    Cyst/Mass excised from posterior neck    OTHER SURGICAL HISTORY  12/15    excision back lesion-Dr. Wai Brothers    WISDOM TOOTH EXTRACTION         Family History   Problem Relation Age of Onset    Diabetes Mother     Diabetes Father     High Blood Pressure Father     Asthma Father     Hypertension Father     Elevated Lipids Father     Prostate Cancer Father     Sleep Apnea Father        Review of Systems   Constitutional: Negative for fatigue. Eyes: Negative for photophobia, pain and visual disturbance. Respiratory: Negative for apnea, cough, choking, chest tightness, shortness of breath and wheezing. Cardiovascular: Negative for chest pain, palpitations and leg swelling. Gastrointestinal: Negative for abdominal distention, abdominal pain, blood in stool, constipation, diarrhea, nausea and vomiting. Endocrine: Negative for cold intolerance and heat intolerance. Musculoskeletal: Negative for arthralgias and myalgias. Skin: Negative for rash. Neurological: Negative for dizziness, tremors, syncope, weakness, numbness and headaches. Psychiatric/Behavioral: Negative for agitation, confusion, decreased concentration, dysphoric mood, hallucinations, sleep disturbance and suicidal ideas. The patient is not nervous/anxious and is not hyperactive.         Physical Exam  Constitutional:       Appearance: She is well-developed. HENT:      Head: Normocephalic. Eyes:      Conjunctiva/sclera: Conjunctivae normal.   Abdominal:      General: Abdomen is protuberant. Musculoskeletal:         General: No swelling. Neurological:      Mental Status: She is alert and oriented to person, place, and time. Psychiatric:         Mood and Affect: Mood normal.         Behavior: Behavior normal.         Thought Content: Thought content normal.         Judgment: Judgment normal.         Orders Only on 11/30/2021   Component Date Value Ref Range Status    Vit D, 25-Hydroxy 11/30/2021 25.1* >=30 ng/mL Final    Comment: <=20 ng/mL. ........... Jenna Rued Deficient  21-29 ng/mL. ......... Jenna Rued Insufficient  >=30 ng/mL. ........ Jenna Rued Sufficient      Microalbumin, Random Urine 11/30/2021 <1.20  <2.0 mg/dL Final    Creatinine, Ur 11/30/2021 165.6  28.0 - 259.0 mg/dL Final    Microalbumin Creatinine Ratio 11/30/2021 see below  0.0 - 30.0 mg/g Final    Comment: Ratio cannot be calculated since microalbumin level is below  the lower detection limit.       TSH 11/30/2021 3.04  0.27 - 4.20 uIU/mL Final    Hemoglobin A1C 11/30/2021 8.1  See comment % Final    Comment: Comment:  Diagnosis of Diabetes: > or = 6.5%  Increased risk of diabetes (Prediabetes): 5.7-6.4%  Glycemic Control: Nonpregnant Adults: <7.0%                    Pregnant: <6.0%        eAG 11/30/2021 185.8  mg/dL Final    Cholesterol, Total 11/30/2021 192  0 - 199 mg/dL Final    Triglycerides 11/30/2021 101  0 - 150 mg/dL Final    HDL 11/30/2021 62* 40 - 60 mg/dL Final    LDL Calculated 11/30/2021 110* <100 mg/dL Final    VLDL Cholesterol Calculated 11/30/2021 20  Not Established mg/dL Final    Sodium 11/30/2021 136  136 - 145 mmol/L Final    Potassium 11/30/2021 4.3  3.5 - 5.1 mmol/L Final    Chloride 11/30/2021 96* 99 - 110 mmol/L Final    CO2 11/30/2021 26  21 - 32 mmol/L Final    Anion Gap 11/30/2021 14  3 - 16 Final    Glucose 11/30/2021 handouts and education material provided and reviewed in detail with the patient. All questions answered. Encouraged patient to keep a food journal and to bring it to her next visit. Discussed available treatment options in addition to lifestyle changes including medications or OPTIFAST. She is most interested in anti-obesity medications. Qsymia may be recommended at the next visit depending on her progress and lab results. she understands that medications are most effective as part of a comprehensive treatment plan that includes nutrition, physical activity, and behavior modification. The patient also understands that she will need close follow-ups every 2-4 weeks if she starts treatment. Depending on the patient's success with these changes, she may also be a good candidate for bariatric surgery down the line. Follow-up in 2 weeks to discuss lab results and treatment plan. Patient advised that it is her responsibility to follow up on any ordered tests/lab results. Patient understands and agrees with the plan. - Vitamin B12 & Folate; Future  - Vitamin D 25 Hydroxy; Future  - EKG 12 Lead; Future    2. Dietary counseling and surveillance  1800-Garrett/low carb meal plan. 3. Type 2 diabetes mellitus without complication, with long-term current use of insulin (HCC)  Stable. Managed by endo. Reinforced low carb/garrett meal plan. Monitor blood sugar levels closely when making dietary changes. Report any episodes of hypoglycemia.           Nutrition:  [] LCHF/Ketogenic [x] Low carb/low-calorie diet [] Low-calorie diet     []Maintenance        FITTE:   [x] Cardio [] Resistance/stength exercises   [x] ACSM recommendations (150 minutes/week)           Behavior:   [x] Motivational interviewing performed    [x] Referral for counseling  [x] Discussed strategies to overcome habits/challenges for focus      [x] Stress management   [x] Stimulus control  [x] Sleep hygiene      Orders Placed This Encounter   Procedures    Vitamin B12 & Folate     Standing Status:   Future     Standing Expiration Date:   2/12/2023    Vitamin D 25 Hydroxy     Standing Status:   Future     Standing Expiration Date:   2/12/2023    EKG 12 Lead     Standing Status:   Future     Standing Expiration Date:   3/12/2022     Order Specific Question:   Reason for Exam?     Answer: Other       No follow-ups on file. Gordon Sharma is a 28 y.o. female being evaluated by a Virtual Visit (video visit) encounter to address concerns as mentioned above. A caregiver was present when appropriate. Due to this being a TeleHealth encounter (During TSPRP-57 public health emergency), evaluation of the following organ systems was limited: Vitals/Constitutional/EENT/Resp/CV/GI//MS/Neuro/Skin/Heme-Lymph-Imm. Pursuant to the emergency declaration under the [address] [place] [address] authority and the [employer] Act, this Virtual Visit was conducted with patient's (and/or legal guardian's) consent, to reduce the patient's risk of exposure to COVID-19 and provide necessary medical care. The patient (and/or legal guardian) has also been advised to contact this office for worsening conditions or problems, and seek emergency medical treatment and/or call 911 if deemed necessary. Services were provided through a video synchronous discussion virtually to substitute for in-person clinic visit. Patient and provider were located at their individual homes. --Denise Ortega MD on 2/12/2022 at 2:30 PM    An electronic signature was used to authenticate this note.

## 2022-02-14 ENCOUNTER — TELEPHONE (OUTPATIENT)
Dept: BARIATRICS/WEIGHT MGMT | Age: 32
End: 2022-02-14

## 2022-02-14 RX ORDER — LISINOPRIL 10 MG/1
10 TABLET ORAL DAILY
Qty: 30 TABLET | Refills: 5 | Status: SHIPPED | OUTPATIENT
Start: 2022-02-14 | End: 2022-09-18

## 2022-02-14 NOTE — TELEPHONE ENCOUNTER
Requested Prescriptions     Pending Prescriptions Disp Refills    lisinopril (PRINIVIL;ZESTRIL) 10 MG tablet 30 tablet      Sig: Take 1 tablet by mouth daily       LOV 1/24/2022  No f/u  Labs 11/30/21

## 2022-02-14 NOTE — TELEPHONE ENCOUNTER
Called pt to review 1800 kcal LCMP and provided copy via email. Pt reports she is trying to eat 3 meals and 2 snacks/day, decreasing coke, and incorporating more fruits and veggies. Tracking with carb manager, states she can adjust goals to match MP. Pt verbalized understanding of guidelines and has no further questions at this time.

## 2022-02-14 NOTE — TELEPHONE ENCOUNTER
----- Message from Malena Wise MD sent at 2022  2:25 PM EST -----  Regardin-Garrett/LC Meal Plan  Please call/email 1800-Garrett/LC meal plan.  Thank you

## 2022-02-14 NOTE — TELEPHONE ENCOUNTER
From: Meredith Huang  To: Dr. Guerrero Nobles: 2/12/2022 1:52 PM EST  Subject: lisinopril 10 MG tablet     Hi Dr Stephanie Heredia,    I mary out of my lisinopril 10 MG tablet. I had enough to last me for about a year and it finally ran its course. Would you be able to prescribe me more please? I don't need a year supply but 30 days would be great.      Thanks,   Isabella Velasquez

## 2022-02-22 ENCOUNTER — CLINICAL DOCUMENTATION (OUTPATIENT)
Dept: PSYCHOLOGY | Age: 32
End: 2022-02-22

## 2022-02-23 ENCOUNTER — HOSPITAL ENCOUNTER (OUTPATIENT)
Age: 32
Discharge: HOME | End: 2022-02-23
Payer: COMMERCIAL

## 2022-02-23 DIAGNOSIS — E66.01 MORBID OBESITY WITH BMI OF 70 AND OVER, ADULT (HCC): ICD-10-CM

## 2022-02-23 LAB
EKG ATRIAL RATE: 75 BPM
EKG DIAGNOSIS: NORMAL
EKG P AXIS: 19 DEGREES
EKG P-R INTERVAL: 148 MS
EKG Q-T INTERVAL: 392 MS
EKG QRS DURATION: 82 MS
EKG QTC CALCULATION (BAZETT): 437 MS
EKG R AXIS: 34 DEGREES
EKG T AXIS: 21 DEGREES
EKG VENTRICULAR RATE: 75 BPM
FOLATE: 4.85 NG/ML (ref 4.78–24.2)
VITAMIN B-12: 553 PG/ML (ref 211–911)
VITAMIN D 25-HYDROXY: 20.5 NG/ML

## 2022-02-23 PROCEDURE — 82306 VITAMIN D 25 HYDROXY: CPT

## 2022-02-23 PROCEDURE — 82746 ASSAY OF FOLIC ACID SERUM: CPT

## 2022-02-23 PROCEDURE — 93010 ELECTROCARDIOGRAM REPORT: CPT | Performed by: INTERNAL MEDICINE

## 2022-02-23 PROCEDURE — 93005 ELECTROCARDIOGRAM TRACING: CPT

## 2022-02-23 PROCEDURE — 82607 VITAMIN B-12: CPT

## 2022-02-23 PROCEDURE — 36415 COLL VENOUS BLD VENIPUNCTURE: CPT

## 2022-02-24 ENCOUNTER — HOSPITAL ENCOUNTER (OUTPATIENT)
Dept: SLEEP CENTER | Age: 32
Discharge: HOME | End: 2022-02-24
Payer: COMMERCIAL

## 2022-02-24 DIAGNOSIS — E66.01 MORBID OBESITY WITH BMI OF 70 AND OVER, ADULT (HCC): Chronic | ICD-10-CM

## 2022-02-24 DIAGNOSIS — G47.33 OBSTRUCTIVE SLEEP APNEA SYNDROME: ICD-10-CM

## 2022-02-24 PROCEDURE — 95810 POLYSOM 6/> YRS 4/> PARAM: CPT

## 2022-02-26 ENCOUNTER — TELEMEDICINE (OUTPATIENT)
Dept: BARIATRICS/WEIGHT MGMT | Age: 32
End: 2022-02-26
Payer: COMMERCIAL

## 2022-02-26 DIAGNOSIS — Z71.3 DIETARY COUNSELING AND SURVEILLANCE: ICD-10-CM

## 2022-02-26 DIAGNOSIS — E55.9 VITAMIN D DEFICIENCY: ICD-10-CM

## 2022-02-26 DIAGNOSIS — E66.01 MORBID OBESITY WITH BMI OF 70 AND OVER, ADULT (HCC): Primary | ICD-10-CM

## 2022-02-26 PROCEDURE — 99214 OFFICE O/P EST MOD 30 MIN: CPT | Performed by: FAMILY MEDICINE

## 2022-02-26 RX ORDER — PHENTERMINE AND TOPIRAMATE 3.75; 23 MG/1; MG/1
1 CAPSULE, EXTENDED RELEASE ORAL DAILY
Qty: 14 CAPSULE | Refills: 0 | Status: SHIPPED | OUTPATIENT
Start: 2022-02-26 | End: 2022-03-12

## 2022-02-26 RX ORDER — ERGOCALCIFEROL 1.25 MG/1
50000 CAPSULE ORAL WEEKLY
Qty: 8 CAPSULE | Refills: 0 | Status: SHIPPED | OUTPATIENT
Start: 2022-02-26

## 2022-02-26 ASSESSMENT — ENCOUNTER SYMPTOMS
EYE PAIN: 0
NAUSEA: 0
CONSTIPATION: 0
WHEEZING: 0
CHEST TIGHTNESS: 0
ABDOMINAL DISTENTION: 0
BLOOD IN STOOL: 0
APNEA: 0
CHOKING: 0
PHOTOPHOBIA: 0
SHORTNESS OF BREATH: 0
ABDOMINAL PAIN: 0
VOMITING: 0
DIARRHEA: 0
COUGH: 0

## 2022-02-26 NOTE — PROGRESS NOTES
Patient: Cindy Tran                      Encounter Date: 2/26/2022    YOB: 1990                Age: 28 y.o. Chief Complaint   Patient presents with    Weight Management     F/u MWM         Patient identification was verified at the start of the visit. Patient-Reported Vitals 2/12/2022   Patient-Reported Weight 433.8   Patient-Reported Height 53   Patient-Reported Pulse -   Patient-Reported Temperature 95.9   Patient-Reported SpO2 84/98         BP Readings from Last 1 Encounters:   01/26/22 131/79       BMI Readings from Last 1 Encounters:   01/26/22 80.63 kg/m²       Pulse Readings from Last 1 Encounters:   01/26/22 89           Wt Readings from Last 3 Encounters:   01/26/22 (!) 448 lb (203.2 kg)   01/24/22 (!) 448 lb (203.2 kg)   11/30/21 (!) 442 lb 4.8 oz (200.6 kg)       Self-reported weight: 425.8 pounds (per home scale)     HPI: 28 y.o. female with a long-standing history of obesity presents today for virtual video follow-up. she has lost 8 pounds since her last visit. Current treatment includes low carb/goyo diet. Tolerating it well. Food recall reviewed with the patient. Making better dietary choices. Motivated to continue losing weight. Interested in aom.      Labs and EKG completed and reviewed     Vit D 20.5      Allergies   Allergen Reactions    No Known Drug Allergy          Current Outpatient Medications:     vitamin D (ERGOCALCIFEROL) 1.25 MG (29439 UT) CAPS capsule, Take 1 capsule by mouth once a week, Disp: 8 capsule, Rfl: 0    lisinopril (PRINIVIL;ZESTRIL) 10 MG tablet, Take 1 tablet by mouth daily, Disp: 30 tablet, Rfl: 5    Insulin Degludec (TRESIBA FLEXTOUCH) 200 UNIT/ML SOPN, 140 units daily SC, Disp: 8 pen, Rfl: 2    escitalopram (LEXAPRO) 20 MG tablet, Take 1 tablet by mouth daily, Disp: 30 tablet, Rfl: 5    Continuous Blood Gluc Sensor (FREESTYLE SHARDA 2 SENSOR) [facility], Every 2 week as needed, Disp: 2 each, Rfl: 3    insulin lispro, 1 Unit Dial, (HUMALOG KWIKPEN) 100 UNIT/ML SOPN, INJECT 20-24  UNITS SUBCUTANEOUSLY BEFORE MEALS 3 TIMES DAILY, Disp: 10 pen, Rfl: 5    Dulaglutide (TRULICITY) 3 RF/7.3AI SOPN, Inject 3 mg into the skin once a week, Disp: 13 pen, Rfl: 2    Norgestim-Eth Estrad Triphasic 0.18/0.215/0.25 MG-35 MCG TABS, TAKE ONE TABLET BY MOUTH DAILY, Disp: 28 tablet, Rfl: 11    albuterol sulfate  (90 Base) MCG/ACT inhaler, Inhale 2 puffs into the lungs every 6 hours as needed for wheezing, Disp: 1 Inhaler, Rfl: 1    montelukast (SINGULAIR) 10 MG tablet, Take 1 tablet by mouth nightly, Disp: 30 tablet, Rfl: 5    Continuous Blood Gluc  (FREESTYLE SHARDA 2 READER) JERICA, As needed, Disp: 1 each, Rfl: 0    ondansetron (ZOFRAN) 8 MG tablet, Take 1 tablet by mouth every 8 hours as needed for Nausea, Disp: 24 tablet, Rfl: 1    cyclobenzaprine (FLEXERIL) 10 MG tablet, Take 1 po q 8 hours prn low back pain/ tension headaches., Disp: 45 tablet, Rfl: 2    Insulin Pen Needle (B-D UF III MINI PEN NEEDLES) 31G X 5 MM MISC, 4 times a day, Disp: 150 each, Rfl: 6    Patient Active Problem List   Diagnosis    Asthma    Polycystic ovarian disease    Obstructive sleep apnea    Pure hypercholesterolemia    Morbid obesity (HCC)    Allergic rhinitis    Acne    Diabetes mellitus (HCC)    PCOS (polycystic ovarian syndrome)    TAY (obstructive sleep apnea)    Obesity    Folic acid deficiency    Headache    Skin lesion    Major depressive disorder, single episode, mild (HCC)    Mass of subcutaneous tissue of back    Vitamin D deficiency    Depression with anxiety    Controlled type 2 diabetes mellitus with complication, with long-term current use of insulin (HCC)    Morbid obesity with BMI of 70 and over, adult ([facility] [address]       Review of Systems   Constitutional: Negative for fatigue. Eyes: Negative for photophobia, pain and visual disturbance. Respiratory: Negative for apnea, cough, choking, chest tightness, shortness of breath and wheezing. ng/mL......... Meryle Sandman Sufficient      Vitamin B-12 02/23/2022 553  211 - 911 pg/mL Final    Folate 02/23/2022 4.85  4.78 - 24.20 ng/mL Final    Comment: Effective 11-15-16 10:00am EST  Please note reference ranges have  changed for Folate. Assessment and Plan:  1. Morbid obesity with BMI of 70 and over, adult [facility])  The patient is an appropriate candidate for weight loss. Losing weight will help the patient avoid/improve obesity related comorbidities. Discussed risks, benefits and alternatives of Qsymia. Patient meets BMI criteria, agrees to confirm negative pregnancy status monthly, denies any significant coronary artery disease, glaucoma or hyperthyroidism. she denies MAOI use within the past 14 days and has no known hypersensitivity to the sympathomimetic amines, kidney or liver impairment. Start Qsymia 3.75 mg/23 mg once daily in the morning for two weeks and then follow-up in two weeks to determine further dosing. Explained to patient that I will monitor her weight loss every 12 weeks and if she has not lost at least 5% of her body weight, that I will either increase the medication or discontinue it. Counseled patient on proper use and potential side effects. Explained to patient that the maximum dose of this medication will need to be tapered when she is ready to discontinue it. she understands that abrupt cessation of this dose may cause adverse reactions including seizures. she understands that it is her  responsibility to make sure that she does not run out of medications and to follow up to her appointments every 24 weeks as recommended. Heavily counseled on the importance of therapeutic lifestyle changes through diet and exercise. Discussed possible side effects of palpitations, irritability, paresthesias, dizziness, dysgeusia, insomnia, constipation and dry mouth. Patient is responsible for keeping her monthly appointments.  Failure to comply with her monthly visits will result in discontinuing Qsymia. Patient advised to report any side effects. 2. Dietary counseling and surveillance  Low carb/goyo meal plan. 3. Vitamin D deficiency  Start Vit D 50,000 IU weekly x 8 weeks. Nutrition Plan: [] LCHF/Ketogenic   [x] Modified low-calorie diet (low carb/low-goyo)               [] Low-calorie diet    [] Maintenance       []Other        Exercise: [x] Cardio     [x] Resistance/strength training                       [x] ACSM recommendations (150 minutes/week in active weight loss)               Behavior: [x] Motivational interviewing performed    [] Referralfor counseling                         [x] Discussed strategies to overcome habits/challenges for focus         [] Stress management   [x] Stimulus control         [] Sleep hygiene      Reviewed:  [x] Nutrition and the importance of regular protein intake  [x] Hidden carbohydrate sources  [x] Alcohol use  [x] Tobacco use   [x] Drug use- Denies  [x] Importance of exercise and reducingsedentary time  [x] Treatment consent- Patient understands and agrees with the treatment plan   [x] Proper use of medication and side effects  [x] OARRS report  [x] Labs  [x] EKG    Controlled Substance Monitoring:    RX Monitoring 11/15/2017   Attestation The Prescription Monitoring Report for this patient was reviewed today. Periodic Controlled Substance Monitoring Possible medication side effects, risk of tolerance and/or dependence, and alternative treatments discussed. ;No signs of potential drug abuse or diversion identified. Patient denies any history of cardiovascular disease (e.g., CAD, stroke, arrhythmias, CHF, uncontrolled HTN), seizure disorder, MAOI use within the last 2 weeks, hyperthyroidism, glaucoma, agitated states, history of drug abuse, pregnancy, nursing, known hypersensitivity to the prescribing meds, history of pancreatitis, or personal or family history of thyroid medullary cancer.       Treatment start date: 3/1/22  12 weeks: 6/1/22  Starting weight: 425 pounds   Goal: At least 5% (21 pounds)    Key dietary points:    - Meats (preferably organic or grass fed) are great sources of protein and have no carbohydrates. - Recommend coconut, olive, avocado, or almond oils. - When buying dairy, choose regular or full fat options. - Choose vegetables that grow above ground as they are generally lower in carbohydrates and higher in fiber.  - Avoid starches such as bread, rice, potatoes, pasta and all sources of simple sugars (desserts, soda, breakfast cereals). - Choose beverages that are calorie and sugar free. Reminder regarding weight loss medications: You must be seen in office every 2-4 weeks to haveyour prescriptions refilled. If you are off of your medication for longer than 7 days, you will not be able to restart the medication for at least 6 months. Always call our office to report any side effects. Females, it is your responsibility to obtain negative pregnancy tests each month. No orders of the defined types were placed in this encounter. No follow-ups on file. Miroslava Mccormick is a 28 y.o. female being evaluated by a Virtual Visit (video visit) encounter to address concerns as mentioned above. A caregiver was present when appropriate. Due to this being a TeleHealth encounter (During [facility] public health emergency), evaluation of the following organ systems was limited: Vitals/Constitutional/EENT/Resp/CV/GI//MS/Neuro/Skin/Heme-Lymph-Imm. Pursuant to the emergency declaration under the [address] [place] [address], [address] [place] [address] authority and the [employer] Act, this Virtual Visit was conducted with patient's (and/or legal guardian's) consent, to reduce the patient's risk of exposure to COVID-19 and provide necessary medical care.   The patient (and/or legal guardian) has also been advised to contact this office for worsening conditions or problems, and seek emergency medical treatment and/or call 911 if deemed necessary.

## 2022-02-27 DIAGNOSIS — G47.33 OBSTRUCTIVE SLEEP APNEA SYNDROME: Primary | ICD-10-CM

## 2022-02-27 PROCEDURE — 95810 POLYSOM 6/> YRS 4/> PARAM: CPT | Performed by: INTERNAL MEDICINE

## 2022-03-03 ENCOUNTER — OFFICE VISIT (OUTPATIENT)
Dept: GYNECOLOGY | Age: 32
End: 2022-03-03
Payer: COMMERCIAL

## 2022-03-03 ENCOUNTER — OFFICE VISIT (OUTPATIENT)
Dept: ENDOCRINOLOGY | Age: 32
End: 2022-03-03
Payer: COMMERCIAL

## 2022-03-03 VITALS
BODY MASS INDEX: 51.91 KG/M2 | WEIGHT: 293 LBS | HEIGHT: 63 IN | SYSTOLIC BLOOD PRESSURE: 130 MMHG | DIASTOLIC BLOOD PRESSURE: 88 MMHG | RESPIRATION RATE: 17 BRPM | HEART RATE: 78 BPM

## 2022-03-03 VITALS
HEIGHT: 63 IN | DIASTOLIC BLOOD PRESSURE: 86 MMHG | SYSTOLIC BLOOD PRESSURE: 128 MMHG | WEIGHT: 293 LBS | HEART RATE: 88 BPM | OXYGEN SATURATION: 98 % | BODY MASS INDEX: 51.91 KG/M2

## 2022-03-03 DIAGNOSIS — E28.2 POLYCYSTIC OVARIAN DISEASE: ICD-10-CM

## 2022-03-03 DIAGNOSIS — E66.01 CLASS 3 SEVERE OBESITY WITH BODY MASS INDEX (BMI) GREATER THAN OR EQUAL TO 70 IN ADULT, UNSPECIFIED OBESITY TYPE, UNSPECIFIED WHETHER SERIOUS COMORBIDITY PRESENT (HCC): ICD-10-CM

## 2022-03-03 DIAGNOSIS — Z01.419 WELL WOMAN EXAM WITH ROUTINE GYNECOLOGICAL EXAM: Primary | ICD-10-CM

## 2022-03-03 DIAGNOSIS — E11.65 UNCONTROLLED TYPE 2 DIABETES MELLITUS WITH HYPERGLYCEMIA (HCC): Primary | ICD-10-CM

## 2022-03-03 LAB — HBA1C MFR BLD: 8 %

## 2022-03-03 PROCEDURE — 83036 HEMOGLOBIN GLYCOSYLATED A1C: CPT | Performed by: INTERNAL MEDICINE

## 2022-03-03 PROCEDURE — 99395 PREV VISIT EST AGE 18-39: CPT | Performed by: OBSTETRICS & GYNECOLOGY

## 2022-03-03 PROCEDURE — 99214 OFFICE O/P EST MOD 30 MIN: CPT | Performed by: INTERNAL MEDICINE

## 2022-03-03 PROCEDURE — 95251 CONT GLUC MNTR ANALYSIS I&R: CPT | Performed by: INTERNAL MEDICINE

## 2022-03-03 RX ORDER — EMPAGLIFLOZIN 10 MG/1
1 TABLET, FILM COATED ORAL DAILY
Qty: 90 TABLET | Refills: 1 | Status: SHIPPED | OUTPATIENT
Start: 2022-03-03 | End: 2022-08-19 | Stop reason: SDUPTHER

## 2022-03-03 RX ORDER — NORGESTIMATE AND ETHINYL ESTRADIOL 7DAYSX3 28
1 KIT ORAL DAILY
Qty: 84 TABLET | Refills: 3 | Status: SHIPPED | OUTPATIENT
Start: 2022-03-03

## 2022-03-03 RX ORDER — BUSPIRONE HYDROCHLORIDE 5 MG/1
5 TABLET ORAL 2 TIMES DAILY
COMMUNITY
End: 2022-07-14 | Stop reason: SDUPTHER

## 2022-03-03 ASSESSMENT — ENCOUNTER SYMPTOMS
GASTROINTESTINAL NEGATIVE: 1
EYES NEGATIVE: 1
RESPIRATORY NEGATIVE: 1

## 2022-03-03 NOTE — PROGRESS NOTES
Seen as f/u patient for diabetes    Interim;       Using CGM      Some night lows  Diagnosed with Type 2 diabetes mellitus at age 16 years  Known diabetic complications: none   Uncontrolled  Insulin started a few year after diagnosis  On metformin initially at time of diagnosis    Current diabetic medications     Tresiba 459 units   Trulicity 3mg  Novolog 20 units AC TID     SSI 2 for 50 >150    Did not tolerate metformin    Last A1c  8%<---- 7.8%<----- 9.6%<----- 11.8%<-----9.6%<-----9.4%<-----10.1%<------- 11.7 on 7/18 <--- 11.3 <--- 10.9    Prior visit with dietician: Yes   Current diet: on average, 3 meals per day   Current exercise: walking   Current monitoring regimen: home blood tests - not checking for a month    Has brought blood glucose log/meter: yes   Home blood sugar records: CGM    average 160    No significant lows    Any episodes of hypoglycemia? --    No Hx of CAD , PVD, CVA    Hyperlipidemia:   Not on medication  uncontrolled   on 7/18   on 1/19   on 8/20   on 11/21    Last eye exam: 2020  Last foot exam: 1/19  Last microalbumin to creatinine ratio:11/21    She has a h/o PCOS, on OCP    H/o obesity. Has been gaining weight.  She is on low CHO diet  Does reports ate more, increased appetite    She has vit D deficiency  Last 7 on 4.19    Takes vit D 50,000 IU1/week  Restarted 4/21  Level was 18      SH: Mom is our clinic patient, maureen carrillo    Past Medical History:   Diagnosis Date    Acne     Allergic rhinitis     Anxiety     Asthma     Cellulitis     on back/ on bactrim for/ pcp aware    COVID-19 12/2020    Depression     Diabetes mellitus type 1 ([address] 5/35    Folic acid deficiency     Hyperlipidemia     Morbid obesity with BMI of 70 and over, adult ([facility] [address]     Obesity     Obstructive sleep apnea 1/5/2011    TAY (obstructive sleep apnea) 7/07    PCOS (polycystic ovarian syndrome)     Pre-operative clearance     Pyelonephritis 12/08    left    Vitamin D deficiency      Past Surgical History:   Procedure Laterality Date    CHOLECYSTECTOMY  1/15/2013    MULTIPORT ROBOTIC ASSISTED LAPAROSCOPIC CHOLECYSTECTOMY    CYST REMOVAL      OTHER SURGICAL HISTORY  2009    Cyst/Mass excised from posterior neck    OTHER SURGICAL HISTORY  12/15    excision back lesion-Dr. Rebeka Bangura    WISDOM TOOTH EXTRACTION       Current Outpatient Medications   Medication Sig Dispense Refill    Norgestim-Eth Estrad Triphasic 0.18/0.215/0.25 MG-35 MCG TABS Take 1 tablet by mouth daily 84 tablet 3    vitamin D (ERGOCALCIFEROL) 1.25 MG (93787 UT) CAPS capsule Take 1 capsule by mouth once a week 8 capsule 0    Phentermine-Topiramate (QSYMIA) 3.75-23 MG CP24 Take 1 capsule by mouth daily for 14 days. 14 capsule 0    lisinopril (PRINIVIL;ZESTRIL) 10 MG tablet Take 1 tablet by mouth daily 30 tablet 5    Insulin Degludec (TRESIBA FLEXTOUCH) 200 UNIT/ML SOPN 140 units daily SC 8 pen 2    escitalopram (LEXAPRO) 20 MG tablet Take 1 tablet by mouth daily 30 tablet 5    Continuous Blood Gluc Sensor (FREESTYLE SHARDA 2 SENSOR) MISC Every 2 week as needed 2 each 3    insulin lispro, 1 Unit Dial, (HUMALOG KWIKPEN) 100 UNIT/ML SOPN INJECT 20-24  UNITS SUBCUTANEOUSLY BEFORE MEALS 3 TIMES DAILY 10 pen 5    Dulaglutide (TRULICITY) 3 TX/5.6DU SOPN Inject 3 mg into the skin once a week 13 pen 2    albuterol sulfate  (90 Base) MCG/ACT inhaler Inhale 2 puffs into the lungs every 6 hours as needed for wheezing 1 Inhaler 1    montelukast (SINGULAIR) 10 MG tablet Take 1 tablet by mouth nightly 30 tablet 5    Continuous Blood Gluc  (FREESTYLE SHARDA 2 READER) JERICA As needed 1 each 0    ondansetron (ZOFRAN) 8 MG tablet Take 1 tablet by mouth every 8 hours as needed for Nausea 24 tablet 1    cyclobenzaprine (FLEXERIL) 10 MG tablet Take 1 po q 8 hours prn low back pain/ tension headaches.  45 tablet 2    Insulin Pen Needle (B-D UF III MINI PEN NEEDLES) 31G X 5 MM MISC 4 times a day 150 each 6     No current facility-administered medications for this visit. Review of Systems  Please see scanned document dated and signed      Objective:         Constitutional: Well-developed, appears stated age, cooperative, in no acute distress  H/E/N/M/T:atraumatic, normocephalic, external ears, nose, lips normal without lesions  Eyes: Lids, lashes, conjunctivae and sclerae normal, No proptosis, no redness  Neck: supple, symmetrical, no swelling  Skin: No obvious rashes or lesions present. Skin and hair texture normal  Psychiatric: Judgement and Insight:  judgement and insight appear normal  Neuro: Normal without focal findings, speech is normal normal, speech is spontaneous  Chest: No labored breathing, no chest deformity, no stridor  Musculoskeletal: No joint deformity, swelling    Foot exam: No ulcer, monofilament detected    Lab Reviewed   No components found for: CHLPL  Lab Results   Component Value Date    TRIG 101 11/30/2021    TRIG 97 01/23/2019    TRIG 98 07/06/2018     Lab Results   Component Value Date    HDL 62 (H) 11/30/2021    HDL 51 01/23/2019    HDL 50 07/06/2018     Lab Results   Component Value Date    LDLCALC 110 (H) 11/30/2021    LDLCALC 156 (H) 01/23/2019    LDLCALC 129 (H) 07/06/2018     Lab Results   Component Value Date    LABVLDL 20 11/30/2021    LABVLDL 19 01/23/2019    LABVLDL 20 07/06/2018     Lab Results   Component Value Date    LABA1C 8.1 11/30/2021       Assessment:     French Villaseñor is a 28 y.o. female with :    1.T2DM: Longstanding, uncontrolled, insulin resistant. On high dose of insulin. Discussed weight loss, diet changes, she is on GLP-1 agonist.  Increase prandial insulin. A1c better  Using CGM. May need U-500 nut weight gain is an issue   Control improved recently as more adherent, made diet changes  No plan for pregnancy. Discussed risk with uncontrolled DM  Add SGLT-2 inhibitor  2. HLD : LDL above target, recommend statin  3. Obesity: Discussed weight loss, advise 1500 Kcal diet. Discussed bariatric surgery, she is seeing Dr. Alayna Pearson, on Qsymia  TSH  Check cortisol 24 hour urine  4. PCOS: On OCP  5. Vit D deficiency: Repeat improved    Plan:      Tresiba 140 units   Novolog  24   SSI 2 for 50 >636   Trulicity 3mg   Advise to check blood sugar 4 times a day   Patient to send blood sugar log for titration. Advise to exercise regularly. Advise to low simple carbohydrate and protein with each  meal diet. Diabetes Care: recommend yearly eye exam, foot exam and urine microalbumin to   creatinine ratio. Patient is up-to-date.

## 2022-03-03 NOTE — PATIENT INSTRUCTIONS
Start urine collection at 8 am   First urine goes to bathroom, since it is from the previous night. After that, all the urine should be collected into the Jug. Keep the Jug on Ice or in a Fridge.    Next morning complete urine collection at 8 am and take urine to the lab,

## 2022-03-04 NOTE — PROGRESS NOTES
Subjective:      Patient ID: Rhonda Geiger is a 28 y.o. female. Patient is here for annual. Patient sometimes skips period on ocps. Hx pcos. Review of Systems   Constitutional: Negative. HENT: Negative. Eyes: Negative. Respiratory: Negative. Cardiovascular: Negative. Gastrointestinal: Negative. Genitourinary: Negative. Musculoskeletal: Negative. Skin: Negative. Neurological: Negative. Psychiatric/Behavioral: Negative. Date of Birth 1990  Past Medical History:   Diagnosis Date    Acne     Allergic rhinitis     Anxiety     Asthma     Cellulitis     on back/ on bactrim for/ pcp aware    COVID-19 2020    Depression     Diabetes mellitus type 1 [facility] [address] [facility]     Folic acid deficiency     Hyperlipidemia     Morbid obesity with BMI of 70 and over, adult ([facility] [address]     Obesity     Obstructive sleep apnea 2011    TAY (obstructive sleep apnea)     PCOS (polycystic ovarian syndrome)     Pre-operative clearance     Pyelonephritis     left    Vitamin D deficiency      Past Surgical History:   Procedure Laterality Date    CHOLECYSTECTOMY  1/15/2013    MULTIPORT ROBOTIC ASSISTED LAPAROSCOPIC CHOLECYSTECTOMY    CYST REMOVAL      OTHER SURGICAL HISTORY      Cyst/Mass excised from posterior neck    OTHER SURGICAL HISTORY  12/15    excision back lesion-Dr. Jordana Olvera    WISDOM TOOTH EXTRACTION       OB History    Para Term  AB Living   0 0 0 0 0 0   SAB IAB Ectopic Molar Multiple Live Births   0 0 0 0 0 0     Social History     Socioeconomic History    Marital status: Single     Spouse name: Not on file    Number of children: Not on file    Years of education: Not on file    Highest education level: Not on file   Occupational History    Not on file   Tobacco Use    Smoking status: Never Smoker    Smokeless tobacco: Never Used   Vaping Use    Vaping Use: Never used   Substance and Sexual Activity    Alcohol use:  Yes     Alcohol/week: 0.0 standard drinks     Types: 1 Standard drinks or equivalent per week     Comment: social    Drug use: No    Sexual activity: Never   Other Topics Concern    Not on file   Social History Narrative    Not on file     Social Determinants of Health     Financial Resource Strain: Low Risk     Difficulty of Paying Living Expenses: Not hard at all   Food Insecurity: No Food Insecurity    Worried About Running Out of Food in the Last Year: Never true    [address] in the Last Year: Never true   Transportation Needs: No Transportation Needs    Lack of Transportation (Medical): No    Lack of Transportation (Non-Medical):  No   Physical Activity:     Days of Exercise per Week: Not on file    Minutes of Exercise per Session: Not on file   Stress:     Feeling of Stress : Not on file   Social Connections:     Frequency of Communication with Friends and Family: Not on file    Frequency of Social Gatherings with Friends and Family: Not on file    Attends Taoist Services: Not on file    Active Member of [address] [place] [address] or Organizations: Not on file    Attends Club or Organization Meetings: Not on file    Marital Status: Not on file   Intimate Partner Violence:     Fear of Current or Ex-Partner: Not on file    Emotionally Abused: Not on file    Physically Abused: Not on file    Sexually Abused: Not on file   Housing Stability:     Unable to Pay for Housing in the Last Year: Not on file    Number of [place] in the Last Year: Not on file    Unstable Housing in the Last Year: Not on file     No Known Allergies  Outpatient Medications Marked as Taking for the 3/3/22 encounter (Office Visit) with Mukund Simeon MD   Medication Sig Dispense Refill    Norgestim-Eth Estrad Triphasic 0.18/0.215/0.25 MG-35 MCG TABS Take 1 tablet by mouth daily 84 tablet 3    vitamin D (ERGOCALCIFEROL) 1.25 MG (27472 UT) CAPS capsule Take 1 capsule by mouth once a week 8 capsule 0    Phentermine-Topiramate (QSYMIA) 3.75-23 MG CP24 Take 1 capsule by mouth daily for 14 days. 14 capsule 0    lisinopril (PRINIVIL;ZESTRIL) 10 MG tablet Take 1 tablet by mouth daily 30 tablet 5    Insulin Degludec (TRESIBA FLEXTOUCH) 200 UNIT/ML SOPN 140 units daily SC 8 pen 2    escitalopram (LEXAPRO) 20 MG tablet Take 1 tablet by mouth daily 30 tablet 5    Continuous Blood Gluc Sensor (FREESTYLE SHARDA 2 SENSOR) MISC Every 2 week as needed 2 each 3    insulin lispro, 1 Unit Dial, (HUMALOG KWIKPEN) 100 UNIT/ML SOPN INJECT 20-24  UNITS SUBCUTANEOUSLY BEFORE MEALS 3 TIMES DAILY 10 pen 5    Dulaglutide (TRULICITY) 3 CN/1.0SF SOPN Inject 3 mg into the skin once a week 13 pen 2    albuterol sulfate  (90 Base) MCG/ACT inhaler Inhale 2 puffs into the lungs every 6 hours as needed for wheezing 1 Inhaler 1    montelukast (SINGULAIR) 10 MG tablet Take 1 tablet by mouth nightly 30 tablet 5    Continuous Blood Gluc  (FREESTYLE SHARDA 2 READER) JERICA As needed 1 each 0    ondansetron (ZOFRAN) 8 MG tablet Take 1 tablet by mouth every 8 hours as needed for Nausea 24 tablet 1    cyclobenzaprine (FLEXERIL) 10 MG tablet Take 1 po q 8 hours prn low back pain/ tension headaches. 45 tablet 2    Insulin Pen Needle (B-D UF III MINI PEN NEEDLES) 31G X 5 MM MISC 4 times a day 150 each 6     Family History   Problem Relation Age of Onset    Diabetes Mother     Diabetes Father     High Blood Pressure Father     Asthma Father     Hypertension Father     Elevated Lipids Father     Prostate Cancer Father     Sleep Apnea Father      /88 (Site: Right Lower Arm, Position: Sitting, Cuff Size: Medium Adult)   Pulse 78   Resp 17   Ht 5' 3\" (1.6 m)   Wt (!) 443 lb 6 oz (201.1 kg)   LMP 12/31/2021   BMI 78.54 kg/m²       Objective:   Physical Exam  Constitutional:       Appearance: Normal appearance. She is well-developed and normal weight. HENT:      Head: Normocephalic.       Nose: Nose normal.      Mouth/Throat:      Mouth: Mucous membranes are moist.      Pharynx: Oropharynx is clear. Eyes:      Pupils: Pupils are equal, round, and reactive to light. Neck:      Thyroid: No thyromegaly. Cardiovascular:      Rate and Rhythm: Normal rate and regular rhythm. Pulses: Normal pulses. Heart sounds: Normal heart sounds. No murmur heard. No friction rub. No gallop. Pulmonary:      Effort: Pulmonary effort is normal. No respiratory distress. Breath sounds: Normal breath sounds. No stridor. No wheezing, rhonchi or rales. Chest:      Chest wall: No tenderness. Breasts:      Right: Normal. No swelling, bleeding, inverted nipple, mass, nipple discharge, skin change or tenderness. Left: Normal. No swelling, bleeding, inverted nipple, mass, nipple discharge, skin change or tenderness. Abdominal:      General: Bowel sounds are normal. There is no distension. Palpations: Abdomen is soft. There is no mass. Tenderness: There is no abdominal tenderness. There is no guarding or rebound. Hernia: No hernia is present. There is no hernia in the left inguinal area. Genitourinary:     General: Normal vulva. Exam position: Lithotomy position. Pubic Area: No rash. Labia:         Right: No rash, tenderness, lesion or injury. Left: No rash, tenderness, lesion or injury. Urethra: No prolapse, urethral pain, urethral swelling or urethral lesion. Vagina: No signs of injury and foreign body. No vaginal discharge, erythema, tenderness, bleeding, lesions or prolapsed vaginal walls. Cervix: No cervical motion tenderness, discharge, friability, lesion, erythema, cervical bleeding or eversion. Uterus: Not deviated, not enlarged, not fixed and not tender. Adnexa:         Right: No mass, tenderness or fullness. Left: No mass, tenderness or fullness. Rectum: No anal fissure or external hemorrhoid.       Comments: Normal urethral meatus, normal urethra, nl bladder  Musculoskeletal:         General: No tenderness. Normal range of motion. Cervical back: Normal range of motion and neck supple. No rigidity. Lymphadenopathy:      Cervical: No cervical adenopathy. Lower Body: No right inguinal adenopathy. No left inguinal adenopathy. Skin:     General: Skin is warm and dry. Coloration: Skin is not pale. Findings: No erythema or rash. Neurological:      General: No focal deficit present. Mental Status: She is alert and oriented to person, place, and time. Mental status is at baseline. Deep Tendon Reflexes: Reflexes are normal and symmetric. Psychiatric:         Mood and Affect: Mood normal.         Behavior: Behavior normal.         Thought Content: Thought content normal.         Judgment: Judgment normal.         Assessment:      1. Annual  2. pcos      Plan:      1. Pap, calcium, exercise  2. Occasionally skipping cycles on ocps. Told patient that this can happen. Refill ocps.          Alisson Ordonez MD

## 2022-03-08 ENCOUNTER — TELEPHONE (OUTPATIENT)
Dept: PULMONOLOGY | Age: 32
End: 2022-03-08

## 2022-03-10 ENCOUNTER — TELEMEDICINE (OUTPATIENT)
Dept: BARIATRICS/WEIGHT MGMT | Age: 32
End: 2022-03-10
Payer: COMMERCIAL

## 2022-03-10 VITALS — WEIGHT: 293 LBS | BODY MASS INDEX: 51.91 KG/M2 | HEIGHT: 63 IN

## 2022-03-10 DIAGNOSIS — E66.01 MORBID OBESITY WITH BMI OF 70 AND OVER, ADULT (HCC): Primary | ICD-10-CM

## 2022-03-10 DIAGNOSIS — Z71.3 DIETARY COUNSELING AND SURVEILLANCE: ICD-10-CM

## 2022-03-10 PROCEDURE — 99213 OFFICE O/P EST LOW 20 MIN: CPT | Performed by: FAMILY MEDICINE

## 2022-03-10 RX ORDER — PHENTERMINE AND TOPIRAMATE 7.5; 46 MG/1; MG/1
1 CAPSULE, EXTENDED RELEASE ORAL DAILY
Qty: 30 CAPSULE | Refills: 0 | Status: SHIPPED | OUTPATIENT
Start: 2022-03-10 | End: 2022-04-09

## 2022-03-10 ASSESSMENT — ENCOUNTER SYMPTOMS
CHEST TIGHTNESS: 0
NAUSEA: 0
CHOKING: 0
PHOTOPHOBIA: 0
ABDOMINAL PAIN: 0
CONSTIPATION: 0
COUGH: 0
SHORTNESS OF BREATH: 0
ABDOMINAL DISTENTION: 0
WHEEZING: 0
DIARRHEA: 0
VOMITING: 0
APNEA: 0
BLOOD IN STOOL: 0
EYE PAIN: 0

## 2022-03-10 NOTE — PROGRESS NOTES
Patient: Moise Ahn                      Encounter Date: 3/10/2022    YOB: 1990                Age: 28 y.o. Chief Complaint   Patient presents with    Weight Management     F/u MWM- Qsymia         Patient identification was verified at the start of the visit. Patient-Reported Vitals 2/12/2022   Patient-Reported Weight 433.8   Patient-Reported Height 53   Patient-Reported Pulse -   Patient-Reported Temperature 95.9   Patient-Reported SpO2 84/98         BP Readings from Last 1 Encounters:   03/03/22 128/86       BMI Readings from Last 1 Encounters:   03/10/22 76.99 kg/m²       Pulse Readings from Last 1 Encounters:   03/03/22 88     Wt Readings from Last 3 Encounters:   03/10/22 (!) 434 lb 9.6 oz (197.1 kg)   03/03/22 (!) 440 lb 6.4 oz (199.8 kg)   03/03/22 (!) 443 lb 6 oz (201.1 kg)       HPI: 28 y.o. female with a long-standing history of obesity presents today for virtual video follow-up. she has lost 6 pounds since her last visit. Current treatment includes Qsymia 3.75-23 mg daily and low carb/goyo diet. No side effects. Food recall reviewed with the patient. Making better dietary choices. Motivated to continue losing weight. Goal: BMI <65 prior to proceeding with sleeve gastrectomy (Dr. Aniyah Duran)    Medication(s): Appetite well controlled? [x]Yes      []No    Focus:     [x]Good     []Fair     []Poor    Side effects? No        Any recent change in medication(s)? No    Exercise: []Cardio     []Resistance/strength training     [x]Other: walking   No Known Allergies      Current Outpatient Medications:     Phentermine-Topiramate (QSYMIA) 7.5-46 MG CP24, Take 1 capsule by mouth daily for 30 days. , Disp: 30 capsule, Rfl: 0    Norgestim-Eth Estrad Triphasic 0.18/0.215/0.25 MG-35 MCG TABS, Take 1 tablet by mouth daily, Disp: 84 tablet, Rfl: 3    busPIRone (BUSPAR) 5 MG tablet, Take 5 mg by mouth 2 times daily, Disp: , Rfl:     empagliflozin (JARDIANCE) 10 MG tablet, Take 1 tablet by mouth  Obesity    Folic acid deficiency    Headache    Skin lesion    Major depressive disorder, single episode, mild (HCC)    Mass of subcutaneous tissue of back    Vitamin D deficiency    Depression with anxiety    Controlled type 2 diabetes mellitus with complication, with long-term current use of insulin (HCC)    Morbid obesity with BMI of 70 and over, adult [facility])       Review of Systems   Constitutional: Negative for fatigue. Eyes: Negative for photophobia, pain and visual disturbance. Respiratory: Negative for apnea, cough, choking, chest tightness, shortness of breath and wheezing. Cardiovascular: Negative for chest pain, palpitations and leg swelling. Gastrointestinal: Negative for abdominal distention, abdominal pain, blood in stool, constipation, diarrhea, nausea and vomiting. Endocrine: Negative for cold intolerance and heat intolerance. Musculoskeletal: Negative for arthralgias and myalgias. Skin: Negative for rash. Neurological: Negative for dizziness, tremors, syncope, weakness, numbness and headaches. Psychiatric/Behavioral: Negative for agitation, confusion, decreased concentration, dysphoric mood, hallucinations, sleep disturbance and suicidal ideas. The patient is not nervous/anxious and is not hyperactive. Physical Exam  Constitutional:       Appearance: She is well-developed. HENT:      Head: Normocephalic. Eyes:      Conjunctiva/sclera: Conjunctivae normal.   Abdominal:      General: Abdomen is protuberant. Musculoskeletal:         General: No swelling. Neurological:      Mental Status: She is alert and oriented to person, place, and time. Psychiatric:         Mood and Affect: Mood normal.         Behavior: Behavior normal.         Thought Content: Thought content normal.         Judgment: Judgment normal.         Office Visit on 03/03/2022   Component Date Value Ref Range Status    Hemoglobin A1C 03/03/2022 8.0  % Final         Assessment and Plan:  1. Morbid obesity with BMI of 70 and over, adult [facility])  Improving. Once again, discussed risks and benefits of Qsymia. Patient meets BMI criteria, confirms negative pregnancy status monthly (when applicable), denies glaucoma, kidney or liver impairment, hyperthyroidism, MAOI use within the past 14 days and has no known hypersensitivity to the sympathomimetic amines. Increase Qsymia to 7.5-46 mg daily. Counseled on proper use and potential side effects. Explained to patient this medication will need to be tapered when she is ready to discontinue it. she understands that abrupt cessation of this dose may cause adverse reactions including seizures. she understands that it is her responsibility to make sure that she does not run out of medications and to follow up to her appointments every 24 weeks as recommended. Heavily counseled on the importance of therapeutic lifestyle changes through diet and exercise. - Phentermine-Topiramate (QSYMIA) 7.5-46 MG CP24; Take 1 capsule by mouth daily for 30 days. Dispense: 30 capsule; Refill: 0    2. Dietary counseling and surveillance  Low carb/goyo meal plan.           Nutrition Plan: [] LCHF/Ketogenic   [x] Modified low-calorie diet (low carb/low-goyo)               [] Low-calorie diet    [] Maintenance       []Other        Exercise: [x] Cardio     [] Resistance/strength training                       [x] ACSM recommendations (150 minutes/week in active weight loss)               Behavior: [x] Motivational interviewing performed    [] Referral for counseling                         [x] Discussed strategies to overcome habits/challenges for focus         [] Stress management   [x] Stimulus control         [] Sleep hygiene      Reviewed:  [x] Nutrition and the importance of regular protein intake  [x] Hidden carbohydrate sources  [x] Alcohol use  [x] Tobacco use   [x] Drug use- Denies  [x] Importance of exercise and reducingsedentary time  [x] Treatment consent- Patient understands and agrees with the treatment plan   [x] Proper use of medication and side effects  [x] OARRS report      Controlled Substance Monitoring:    RX Monitoring 11/15/2017   Attestation The Prescription Monitoring Report for this patient was reviewed today. Periodic Controlled Substance Monitoring Possible medication side effects, risk of tolerance and/or dependence, and alternative treatments discussed. ;No signs of potential drug abuse or diversion identified. Patient denies any history of cardiovascular disease (e.g., CAD, stroke, arrhythmias, CHF, uncontrolled HTN), seizure disorder, MAOI use within the last 2 weeks, hyperthyroidism, glaucoma, agitated states, history of drug abuse, pregnancy, nursing, known hypersensitivity to the prescribing meds, history of pancreatitis, or personal or family history of thyroid medullary cancer. Treatment start date: 3/1/22  12 weeks: 6/1/22  Starting weight: 440 pounds   Goal: At least 5% (21 pounds)  Total weight loss: 6 pounds      Key dietary points:    - Meats (preferably organic or grass fed) are great sources of protein and have no carbohydrates. - Recommend coconut, olive, avocado, or almond oils. - When buying dairy, choose regular or full fat options. - Choose vegetables that grow above ground as they are generally lower in carbohydrates and higher in fiber.  - Avoid starches such as bread, rice, potatoes, pasta and all sources of simple sugars (desserts, soda, breakfast cereals). - Choose beverages that are calorie and sugar free. Reminder regarding weight loss medications: You must be seen in office every 2-4 weeks to haveyour prescriptions refilled. If you are off of your medication for longer than 7 days, you will not be able to restart the medication for at least 6 months. Always call our office to report any side effects. Females, it is your responsibility to obtain negative pregnancy tests each month.     No orders of the defined types were placed in this encounter. No follow-ups on file. Flori Gallo is a 28 y.o. female being evaluated by a Virtual Visit (video visit) encounter to address concerns as mentioned above. A caregiver was present when appropriate. Due to this being a TeleHealth encounter (During QZBWZ-44 public health emergency), evaluation of the following organ systems was limited: Vitals/Constitutional/EENT/Resp/CV/GI//MS/Neuro/Skin/Heme-Lymph-Imm. Pursuant to the emergency declaration under the [address] [place] [address], [address] [place] [address] authority and the [employer] and [employer] Act, this Virtual Visit was conducted with patient's (and/or legal guardian's) consent, to reduce the patient's risk of exposure to COVID-19 and provide necessary medical care. The patient (and/or legal guardian) has also been advised to contact this office for worsening conditions or problems, and seek emergency medical treatment and/or call 911 if deemed necessary.

## 2022-03-22 ENCOUNTER — TELEMEDICINE (OUTPATIENT)
Dept: PSYCHOLOGY | Age: 32
End: 2022-03-22
Payer: COMMERCIAL

## 2022-03-22 DIAGNOSIS — F33.0 MILD EPISODE OF RECURRENT MAJOR DEPRESSIVE DISORDER (HCC): Primary | ICD-10-CM

## 2022-03-22 DIAGNOSIS — F41.9 ANXIETY: ICD-10-CM

## 2022-03-22 PROCEDURE — 90791 PSYCH DIAGNOSTIC EVALUATION: CPT | Performed by: PSYCHOLOGIST

## 2022-03-22 NOTE — PROGRESS NOTES
Behavioral Health Consultation  Cloria Rinne, Psy.D. Psychologist  3/22/2022  3-3:30 PM EDT      Time spent with Patient: 30 minutes  This is patient's first [facility] appointment for this episode of care. Pt's last episode of care occurred in 2017. Reason for Consult: Depression, anxiety  Referring Provider: Jurgen Dawson MD  [facility] (During EPODY-45 public health emergency)    Pt provided informed consent for the behavioral health program. Discussed with patient the model of service, including the limits of confidentiality (e.g., abuse reporting, suicide intervention) and the nature of the [facility] approach (e.g., focused, targeted interventions; open communication with PCP). Pt indicated understanding. Feedback given to PCP.  }  Ashwin Green was evaluated through a synchronous (real-time) audio-video encounter. The patient (or guardian, if applicable) is aware that this is a billable service, which includes applicable co-pays. This Virtual Visit was conducted with patient's (and/or legal guardian's) consent. The visit was conducted pursuant to the emergency declaration under the [address] [place] [address] authority and the [employer] Act. Patient identification was verified, and a caregiver was present when appropriate. The patient was located in a state where the provider was licensed to provide care. Conducted a risk-benefit analysis and determined that the patient's presenting problems are consistent with the use of telepsychology. Determined that the patient has sufficient knowledge and skills in the use of technology enabling them to adequately benefit from telepsychology. It was determined that this patient was able to be properly treated without an in-person session.  Patient verified that they were currently located at the address that was provided during registration. Verified the following information:  Patient's identification: Yes  Patient location: Work @ [place] [address]  Patient's call back number: [phone]  Patient's emergency contact's name and number, as well as permission to contact them if needed:  Extended Emergency Contact Information  Primary Emergency Contact: Lupe Vides  Address: [address] [place] [address], [address] Phone: [phone]  Mobile Phone: [phone]  Relation: Parent  Secondary Emergency Contact: Jean Claude Justin  Address: [address] [place] [address], [address] Phone: [phone]  Relation: Parent    Provider location: [place], [place]      S:    Love-Work-Play     -Living Situation - Lives with mother and father by choice. Good relationship with her parents.     -Family / Support System - Parents. Pt is close with 3 friends - sees Bernadette Kayser all the time, Deedee Tamayo from work, best friend Nadir Estevez who is busy with her kids.    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 Stressful but also enjoys her job. Works 12 hour days.      -[place]ili / Tammi Alford / Stress Management - Self-care is a major goal in 2022. Loves to go to the [employer]. [place]. Gotten into working out lately. Getting nails done. Bookstores. Reading. Musicals. [employer] hockey. -Mandaeism / Spiritual Life - Not a focus. Believes in God but not committed to [employer]. Health Behaviors     -Pertinent Medical History - Before this year, had not been doing well with her health. Type 2 diabetes. More focused now on taking medication and insulin daily. Looking into weight loss surgery but currently doing non-surgical weight loss plan. -Exercise - Moving more. Wakes up at 5am 3 days per week and goes to the gym.  Walks with her friend on Sundays.     -Nutrition / Eating History - Pt has a history of \"overeating constantly. \" Now focused on cutting carbs. Has lost 20 lbs so far. Still dislikes vegetables.     -Risk Assessment - No SI, plan or intent at any time. -Mental Health - Depression and anxiety were so bad in recent months. Had panic whenever she walked into a grocery store. Prescribed Lexapro 20mg and Buspar 5mg BID. Social History     Tobacco Use    Smoking status: Never Smoker    Smokeless tobacco: Never Used   Substance Use Topics    Alcohol use: Yes     Alcohol/week: 0.0 standard drinks     Types: 1 Standard drinks or equivalent per week     Comment: social      Illicit drugs:   Social History     Substance and Sexual Activity   Drug Use No        O:  Interventions:  -Contextual assessment  -Supportive techniques  -Discussed motivational patterns and ways to increase motivation when it feels low. Recommended behavior modification techniques (e.g., reward system). Highlighted potential non-food rewards.  -Engaged in cognitive restructuring  -Conducted risk assessment. Appropriate for outpatient / telehealth care at this time. A:  MSE:  Appearance: good hygiene  and appropriate attire  Attitude: cooperative and friendly  Consciousness: alert  Orientation: oriented to person, place, time, general circumstance  Memory: recent and remote memory intact  Attention/Concentration: intact during session  Psychomotor Activity: normal  Eye Contact: normal  Speech: normal rate and volume, well-articulated  Mood: mildly dysphoric and anxious  Affect: congruent  Perception: within normal limits  Thought Content: within normal limits  Thought Process: logical, coherent and goal-directed  Insight: good  Judgment: intact  Ability to understand instructions: Yes  Ability to respond meaningfully: Yes  Morbid Ideation: no   Suicide Assessment: no suicidal ideation, plan, or intent. Appropriate for outpatient / telehealth care at this time.   Homicidal Ideation: no      PHQ Scores 1/24/2022 11/12/2018 9/25/2017 3/24/2016 3/14/2016   PHQ2 Score 4 2 0 3 4   PHQ9 Score 19 2 0 9 14     Interpretation of Total Score Depression Severity: 1-4 = Minimal depression, 5-9 = Mild depression, 10-14 = Moderate depression, 15-19 = Moderately severe depression, 20-27 = Severe depression    Diagnosis:    1. Mild episode of recurrent major depressive disorder ([facility]    2. Anxiety        Patient Active Problem List   Diagnosis    Asthma    Polycystic ovarian disease    Obstructive sleep apnea    Pure hypercholesterolemia    [facility]    Allergic rhinitis    Acne    Diabetes mellitus ([facility]    PCOS (polycystic ovarian syndrome)    TAY (obstructive sleep apnea)    Obesity    Folic acid deficiency    Headache    Skin lesion    Major depressive disorder, single episode, mild (HCC)    Mass of subcutaneous tissue of back    Vitamin D deficiency    Depression with anxiety    Controlled type 2 diabetes mellitus with complication, with long-term current use of insulin (HCC)    Morbid obesity with BMI of 70 and over, adult [facility])         Plan:  Pt interventions:  Established rapport, Datil-setting to identify pt's primary goals for [facility] visit / overall health, Supportive techniques, Provided Psychoeducation re: see above, Emphasized self-care as important for managing overall health, Cognitive strategies to target balanced thinking, Discussed psychotropic medications and Completed risk evaluation. Pt Behavioral Change Plan:  Pt set the following goals:  1. Return for a F/U virtual visit.

## 2022-03-24 ENCOUNTER — TELEPHONE (OUTPATIENT)
Dept: PULMONOLOGY | Age: 32
End: 2022-03-24

## 2022-03-24 RX ORDER — INSULIN DEGLUDEC 200 U/ML
INJECTION, SOLUTION SUBCUTANEOUS
Qty: 8 PEN | Refills: 3 | Status: SHIPPED | OUTPATIENT
Start: 2022-03-24 | End: 2022-08-19 | Stop reason: SDUPTHER

## 2022-03-24 NOTE — TELEPHONE ENCOUNTER
Medication:   Requested Prescriptions     Pending Prescriptions Disp Refills    Insulin Degludec (TRESIBA FLEXTOUCH) 200 UNIT/ML SOPN [Pharmacy Med Name: Genevieve Labrum 200 UNIT/ML] 8 pen 3     Sig: INJECT 140 UNITS UNDER THE SKIN DAILY       Last Filled:      Patient Phone Number: [phone] (home)     Last appt: 3/3/2022   Next appt: 6/9/2022    Last Labs DM:   Lab Results   Component Value Date    LABA1C 8.0 03/03/2022

## 2022-03-28 DIAGNOSIS — E11.65 UNCONTROLLED TYPE 2 DIABETES MELLITUS WITH HYPERGLYCEMIA (HCC): Primary | ICD-10-CM

## 2022-03-28 PROCEDURE — 3052F HG A1C>EQUAL 8.0%<EQUAL 9.0%: CPT | Performed by: INTERNAL MEDICINE

## 2022-03-28 RX ORDER — FLASH GLUCOSE SENSOR
KIT MISCELLANEOUS
Qty: 2 EACH | Refills: 3 | Status: SHIPPED | OUTPATIENT
Start: 2022-03-28 | End: 2022-10-17

## 2022-03-28 NOTE — TELEPHONE ENCOUNTER
Requested Prescriptions     Pending Prescriptions Disp Refills    Continuous Blood Gluc Sensor (FREESTYLE SHARDA 2 SENSOR) MISC 2 each 3     Sig: Every 2 week as needed     LOV: 3/3/22  NOV: 6/9/22    Last Refilled: 11/30/21

## 2022-03-29 ENCOUNTER — TELEPHONE (OUTPATIENT)
Dept: PULMONOLOGY | Age: 32
End: 2022-03-29

## 2022-03-30 ENCOUNTER — TELEPHONE (OUTPATIENT)
Dept: SLEEP CENTER | Age: 32
End: 2022-03-30

## 2022-03-30 NOTE — TELEPHONE ENCOUNTER
Called to schedule a cpap sleep study per Salomon hoover for the pt to return my call     psg done at [facility]

## 2022-04-06 ENCOUNTER — TELEMEDICINE (OUTPATIENT)
Dept: BARIATRICS/WEIGHT MGMT | Age: 32
End: 2022-04-06
Payer: COMMERCIAL

## 2022-04-06 VITALS — BODY MASS INDEX: 51.91 KG/M2 | HEIGHT: 63 IN | WEIGHT: 293 LBS

## 2022-04-06 DIAGNOSIS — E66.01 MORBID OBESITY WITH BMI OF 70 AND OVER, ADULT (HCC): Primary | ICD-10-CM

## 2022-04-06 DIAGNOSIS — Z71.3 DIETARY COUNSELING AND SURVEILLANCE: ICD-10-CM

## 2022-04-06 PROCEDURE — 99213 OFFICE O/P EST LOW 20 MIN: CPT | Performed by: FAMILY MEDICINE

## 2022-04-06 RX ORDER — PHENTERMINE AND TOPIRAMATE 7.5; 46 MG/1; MG/1
1 CAPSULE, EXTENDED RELEASE ORAL DAILY
Qty: 30 CAPSULE | Refills: 0 | Status: SHIPPED | OUTPATIENT
Start: 2022-04-06 | End: 2022-05-06

## 2022-04-06 ASSESSMENT — ENCOUNTER SYMPTOMS
WHEEZING: 0
ABDOMINAL PAIN: 0
ABDOMINAL DISTENTION: 0
CHEST TIGHTNESS: 0
PHOTOPHOBIA: 0
DIARRHEA: 0
CONSTIPATION: 0
SHORTNESS OF BREATH: 0
CHOKING: 0
COUGH: 0
VOMITING: 0
EYE PAIN: 0
APNEA: 0
BLOOD IN STOOL: 0
NAUSEA: 0

## 2022-04-06 NOTE — PROGRESS NOTES
Patient: Samuel Larios                      Encounter Date: 4/6/2022    YOB: 1990                Age: 28 y.o. Chief Complaint   Patient presents with    Weight Management     F/u MWM- Qsymia         Patient identification was verified at the start of the visit. Patient-Reported Vitals 3/22/2022   Patient-Reported Weight 430   Patient-Reported Height 5'3\"   Patient-Reported Pulse -   Patient-Reported Temperature -   Patient-Reported SpO2 -         BP Readings from Last 1 Encounters:   03/03/22 128/86       BMI Readings from Last 1 Encounters:   04/06/22 76.67 kg/m²       Pulse Readings from Last 1 Encounters:   03/03/22 88       Wt Readings from Last 3 Encounters:   04/06/22 (!) 432 lb 12.8 oz (196.3 kg)   03/10/22 (!) 434 lb 9.6 oz (197.1 kg)   03/03/22 (!) 440 lb 6.4 oz (199.8 kg)       HPI: 28 y.o. female with a long-standing history of obesity presents today for virtual video follow-up. she has lost 4 pounds since her last visit. Current treatment includes Qsymia 7.5-46 mg daily and low carb/goyo diet. No issues. Food recall reviewed with the patient. Continuing to make good dietary choices.      Goal: BMI <65 prior to proceeding with sleeve gastrectomy (Dr. Brooke Mike)     Medication(s): Appetite well controlled? [x]? Yes      []? No                          Focus:     [x]? Good     []? Fair     []? Poor                          Side effects? No        Any recent change in medication(s)? No     Exercise: []? Cardio     []? Resistance/strength training     [x]? Other: walking     No Known Allergies      Current Outpatient Medications:     Continuous Blood Gluc Sensor (FREESTYLE SHARDA 2 SENSOR) MISC, Every 2 week as needed, Disp: 2 each, Rfl: 3    Insulin Degludec (TRESIBA FLEXTOUCH) 200 UNIT/ML SOPN, INJECT 140 UNITS UNDER THE SKIN DAILY, Disp: 8 pen, Rfl: 3    Phentermine-Topiramate (QSYMIA) 7.5-46 MG CP24, Take 1 capsule by mouth daily for 30 days. , Disp: 30 capsule, Rfl: 0    Norgestim-Eth [facility] Triphasic 0.18/0.215/0.25 MG-35 MCG TABS, Take 1 tablet by mouth daily, Disp: 84 tablet, Rfl: 3    busPIRone (BUSPAR) 5 MG tablet, Take 5 mg by mouth 2 times daily, Disp: , Rfl:     empagliflozin (JARDIANCE) 10 MG tablet, Take 1 tablet by mouth daily, Disp: 90 tablet, Rfl: 1    vitamin D (ERGOCALCIFEROL) 1.25 MG (28035 UT) CAPS capsule, Take 1 capsule by mouth once a week, Disp: 8 capsule, Rfl: 0    lisinopril (PRINIVIL;ZESTRIL) 10 MG tablet, Take 1 tablet by mouth daily, Disp: 30 tablet, Rfl: 5    escitalopram (LEXAPRO) 20 MG tablet, Take 1 tablet by mouth daily, Disp: 30 tablet, Rfl: 5    insulin lispro, 1 Unit Dial, (HUMALOG KWIKPEN) 100 UNIT/ML SOPN, INJECT 20-24  UNITS SUBCUTANEOUSLY BEFORE MEALS 3 TIMES DAILY, Disp: 10 pen, Rfl: 5    Dulaglutide (TRULICITY) 3 BA/3.7TW SOPN, Inject 3 mg into the skin once a week, Disp: 13 pen, Rfl: 2    albuterol sulfate  (90 Base) MCG/ACT inhaler, Inhale 2 puffs into the lungs every 6 hours as needed for wheezing, Disp: 1 Inhaler, Rfl: 1    montelukast (SINGULAIR) 10 MG tablet, Take 1 tablet by mouth nightly, Disp: 30 tablet, Rfl: 5    Continuous Blood Gluc  (FREESTYLE SHARDA 2 READER) JERICA, As needed, Disp: 1 each, Rfl: 0    ondansetron (ZOFRAN) 8 MG tablet, Take 1 tablet by mouth every 8 hours as needed for Nausea, Disp: 24 tablet, Rfl: 1    cyclobenzaprine (FLEXERIL) 10 MG tablet, Take 1 po q 8 hours prn low back pain/ tension headaches., Disp: 45 tablet, Rfl: 2    Insulin Pen Needle (B-D UF III MINI PEN NEEDLES) 31G X 5 MM MISC, 4 times a day, Disp: 150 each, Rfl: 6    Patient Active Problem List   Diagnosis    Asthma    Polycystic ovarian disease    Obstructive sleep apnea    Pure hypercholesterolemia    Morbid obesity (HCC)    Allergic rhinitis    Acne    Diabetes mellitus (HCC)    PCOS (polycystic ovarian syndrome)    TAY (obstructive sleep apnea)    Obesity    Folic acid deficiency    Headache    Skin lesion    Major depressive disorder, single episode, mild (HCC)    Mass of subcutaneous tissue of back    Vitamin D deficiency    Depression with anxiety    Controlled type 2 diabetes mellitus with complication, with long-term current use of insulin (HCC)    Morbid obesity with BMI of 70 and over, [facility])       Review of Systems   Constitutional: Negative for fatigue. Eyes: Negative for photophobia, pain and visual disturbance. Respiratory: Negative for apnea, cough, choking, chest tightness, shortness of breath and wheezing. Cardiovascular: Negative for chest pain, palpitations and leg swelling. Gastrointestinal: Negative for abdominal distention, abdominal pain, blood in stool, constipation, diarrhea, nausea and vomiting. Endocrine: Negative for cold intolerance and heat intolerance. Musculoskeletal: Negative for arthralgias and myalgias. Skin: Negative for rash. Neurological: Negative for dizziness, tremors, syncope, weakness, numbness and headaches. Psychiatric/Behavioral: Negative for agitation, confusion, decreased concentration, dysphoric mood, hallucinations, sleep disturbance and suicidal ideas. The patient is not nervous/anxious and is not hyperactive. Physical Exam  Constitutional:       Appearance: She is well-developed. HENT:      Head: Normocephalic. Eyes:      Conjunctiva/sclera: Conjunctivae normal.   Abdominal:      General: Abdomen is protuberant. Musculoskeletal:         General: No swelling. Neurological:      Mental Status: She is alert and oriented to person, place, and time. Psychiatric:         Mood and Affect: Mood normal.         Behavior: Behavior normal.         Thought Content: Thought content normal.         Judgment: Judgment normal.         Office Visit on 03/03/2022   Component Date Value Ref Range Status    Hemoglobin A1C 03/03/2022 8.0  % Final         Assessment and Plan:  1. Morbid obesity with BMI of 70 and over, [facility])  Improving.   Contnue current management. Reinforced low carb/goyo diet. F/u 4 weeks. 2. Dietary counseling and surveillance  Low carb/goyo diet. Nutrition Plan: [] LCHF/Ketogenic   [x] Modified low-calorie diet (low carb/low-goyo)               [] Low-calorie diet    [] Maintenance       []Other        Exercise: [x] Cardio     [] Resistance/strength training                       [x] ACSM recommendations (150 minutes/week in active weight loss)               Behavior: [x] Motivational interviewing performed    [] Referral for counseling                         [x] Discussed strategies to overcome habits/challenges for focus         [] Stress management   [x] Stimulus control         [] Sleep hygiene      Reviewed:  [x] Nutrition and the importance of regular protein intake  [x] Hidden carbohydrate sources  [x] Alcohol use  [x] Tobacco use   [x] Drug use- Denies  [x] Importance of exercise and reducingsedentary time  [x] Treatment consent- Patient understands and agrees with the treatment plan   [x] Proper use of medication and side effects  [x] OARRS report      Controlled Substance Monitoring:    RX Monitoring 11/15/2017   Attestation The Prescription Monitoring Report for this patient was reviewed today. Periodic Controlled Substance Monitoring Possible medication side effects, risk of tolerance and/or dependence, and alternative treatments discussed. ;No signs of potential drug abuse or diversion identified. Patient denies any history of cardiovascular disease (e.g., CAD, stroke, arrhythmias, CHF, uncontrolled HTN), seizure disorder, MAOI use within the last 2 weeks, hyperthyroidism, glaucoma, agitated states, history of drug abuse, pregnancy, nursing, known hypersensitivity to the prescribing meds, history of pancreatitis, or personal or family history of thyroid medullary cancer.       Treatment start date: 3/1/22  12 weeks: 6/1/22  Starting weight: 440 pounds   Goal: At least 5% (21 pounds)  Total weight loss: 10 pounds    Key dietary points:    - Meats (preferably organic or grass fed) are great sources of protein and have no carbohydrates. - Recommend coconut, olive, avocado, or almond oils. - When buying dairy, choose regular or full fat options. - Choose vegetables that grow above ground as they are generally lower in carbohydrates and higher in fiber.  - Avoid starches such as bread, rice, potatoes, pasta and all sources of simple sugars (desserts, soda, breakfast cereals). - Choose beverages that are calorie and sugar free. Reminder regarding weight loss medications: You must be seen in office every 2-4 weeks to haveyour prescriptions refilled. If you are off of your medication for longer than 7 days, you will not be able to restart the medication for at least 6 months. Always call our office to report any side effects. Females, it is your responsibility to obtain negative pregnancy tests each month. No orders of the defined types were placed in this encounter. No follow-ups on file. Mickie Williamson is a 28 y.o. female being evaluated by a Virtual Visit (video visit) encounter to address concerns as mentioned above. A caregiver was present when appropriate. Due to this being a TeleHealth encounter (During LZWWI-90 public health emergency), evaluation of the following organ systems was limited: Vitals/Constitutional/EENT/Resp/CV/GI//MS/Neuro/Skin/Heme-Lymph-Imm. Pursuant to the emergency declaration under the [facility], [address] [place] [address] authority and the [employer] Act, this Virtual Visit was conducted with patient's (and/or legal guardian's) consent, to reduce the patient's risk of exposure to COVID-19 and provide necessary medical care.   The patient (and/or legal guardian) has also been advised to contact this office for worsening conditions or problems, and seek emergency medical treatment and/or call 131 if deemed necessary.

## 2022-04-13 ENCOUNTER — TELEMEDICINE (OUTPATIENT)
Dept: PSYCHOLOGY | Age: 32
End: 2022-04-13
Payer: COMMERCIAL

## 2022-04-13 DIAGNOSIS — F33.0 MILD EPISODE OF RECURRENT MAJOR DEPRESSIVE DISORDER (HCC): ICD-10-CM

## 2022-04-13 DIAGNOSIS — F41.9 ANXIETY: Primary | ICD-10-CM

## 2022-04-13 PROCEDURE — 90832 PSYTX W PT 30 MINUTES: CPT | Performed by: PSYCHOLOGIST

## 2022-04-13 NOTE — PROGRESS NOTES
Behavioral Health Consultation  Elizabeth Latham Psy.D. Psychologist  4/13/2022  3:30-4 PM EDT      Time spent with Patient: 30 minutes  This is patient's second [facility] appointment for this episode of care. Reason for Consult: Depression, anxiety  Referring Provider: MD Joseph Alonzo [address] [facility] (During GBGKK-77 public health emergency)  }  Karen Henry was evaluated through a synchronous (real-time) audio-video encounter. The patient (or guardian, if applicable) is aware that this is a billable service, which includes applicable co-pays. This Virtual Visit was conducted with patient's (and/or legal guardian's) consent. The visit was conducted pursuant to the emergency declaration under the [address] [place] [address], [address] [place] [address] [facility] authority and the [employer] General Act. Patient identification was verified, and a caregiver was present when appropriate. The patient was located in a state where the provider was licensed to provide care. Conducted a risk-benefit analysis and determined that the patient's presenting problems are consistent with the use of telepsychology. Determined that the patient has sufficient knowledge and skills in the use of technology enabling them to adequately benefit from telepsychology. It was determined that this patient was able to be properly treated without an in-person session. Patient verified that they were currently located at the address that was provided during registration. Verified the following information:  Patient's identification: Yes  Patient location: Work @ [place] [address]  Patient's call back number: [phone]  Patient's emergency contact's name and number, as well as permission to contact them if needed:  Extended Emergency Contact Information  Primary Emergency Contact: Lupe Vides  [address] [facility] Phone: [phone]  Mobile Phone: [phone]  Relation: Parent  Secondary Emergency Contact: Ata Ruiz  Address: [place] [address] Phone: [phone]  Relation: Parent    Provider location: [place]:  Pt has been taking her journey one day at a time as she focuses on her health goals. Couldn't sleep last night d/t worries, which is unusual for her. Pt shared about her relationship with herself and her negative self-talk. Aware of her tendency to people please, but unsure where this comes from. O:  Interventions:  -Supportive techniques  -Processed experiences / stressors / concerns  -Reinforced efforts towards self-care  -Engaged in cognitive restructuring  -Provided psychoeducation re: attachment vs authenticity, the connection between people-pleasing and fear of rejection, and parts work to manage negative self-talk      A:  MSE:  Appearance: good hygiene  and appropriate attire  Attitude: cooperative and friendly  Consciousness: alert  Orientation: oriented to person, place, time, general circumstance  Memory: recent and remote memory intact  Attention/Concentration: intact during session  Psychomotor Activity: normal  Eye Contact: normal  Speech: normal rate and volume, well-articulated  Mood: mildly dysphoric and anxious  Affect: congruent  Perception: within normal limits  Thought Content: within normal limits  Thought Process: logical, coherent and goal-directed  Insight: good  Judgment: intact  Ability to understand instructions: Yes  Ability to respond meaningfully: Yes  Morbid Ideation: no   Suicide Assessment: no suicidal ideation, plan, or intent. Appropriate for outpatient / telehealth care at this time.   Homicidal Ideation: no      PHQ Scores 1/24/2022 11/12/2018 9/25/2017 3/24/2016 3/14/2016   PHQ2 Score 4 2 0 3 4   PHQ9 Score 19 2 0 9 14     Interpretation of Total Score Depression Severity: 1-4 = Minimal depression, 5-9 = Mild depression, 10-14 = Moderate depression, 15-19 = Moderately severe depression, 20-27 = Severe depression    Diagnosis:    1. Anxiety    2. Mild episode of recurrent major depressive disorder [facility])        Patient Active Problem List   Diagnosis    Asthma    Polycystic ovarian disease    Obstructive sleep apnea    Pure hypercholesterolemia    Morbid [facility] ([facility]    Allergic rhinitis    Acne    Diabetes mellitus ([facility]    PCOS (polycystic ovarian syndrome)    TAY (obstructive sleep apnea)    Obesity    Folic acid deficiency    Headache    Skin lesion    Major depressive disorder, single episode, mild (HCC)    Mass of subcutaneous tissue of back    Vitamin D deficiency    Depression with anxiety    Controlled type 2 diabetes mellitus with complication, with long-term current use of insulin ([facility]    Morbid obesity with BMI of 70 and over, adult ([facility]         Plan:  Pt interventions:  Supportive techniques, Provided Psychoeducation re: see above, Emphasized self-care as important for managing overall health and Cognitive strategies to target balanced thinking. Pt Behavioral Change Plan:  Pt set the following goals:  1. Return for a F/U virtual visit.

## 2022-05-05 ENCOUNTER — HOSPITAL ENCOUNTER (OUTPATIENT)
Dept: SLEEP CENTER | Age: 32
Discharge: HOME | End: 2022-05-05
Payer: COMMERCIAL

## 2022-05-05 ENCOUNTER — TELEMEDICINE (OUTPATIENT)
Dept: BARIATRICS/WEIGHT MGMT | Age: 32
End: 2022-05-05
Payer: COMMERCIAL

## 2022-05-05 VITALS — HEIGHT: 63 IN | WEIGHT: 293 LBS | BODY MASS INDEX: 51.91 KG/M2

## 2022-05-05 DIAGNOSIS — E66.01 MORBID OBESITY WITH BMI OF 70 AND OVER, ADULT (HCC): Primary | ICD-10-CM

## 2022-05-05 DIAGNOSIS — G47.33 OBSTRUCTIVE SLEEP APNEA SYNDROME: ICD-10-CM

## 2022-05-05 DIAGNOSIS — Z71.3 DIETARY COUNSELING AND SURVEILLANCE: ICD-10-CM

## 2022-05-05 PROCEDURE — 99213 OFFICE O/P EST LOW 20 MIN: CPT | Performed by: FAMILY MEDICINE

## 2022-05-05 PROCEDURE — 95811 POLYSOM 6/>YRS CPAP 4/> PARM: CPT

## 2022-05-05 RX ORDER — PHENTERMINE AND TOPIRAMATE 7.5; 46 MG/1; MG/1
1 CAPSULE, EXTENDED RELEASE ORAL DAILY
Qty: 30 CAPSULE | Refills: 0 | Status: SHIPPED | OUTPATIENT
Start: 2022-05-05 | End: 2022-06-02

## 2022-05-05 ASSESSMENT — ENCOUNTER SYMPTOMS
VOMITING: 0
ABDOMINAL PAIN: 0
NAUSEA: 0
CHOKING: 0
ABDOMINAL DISTENTION: 0
COUGH: 0
BLOOD IN STOOL: 0
APNEA: 0
CONSTIPATION: 0
SHORTNESS OF BREATH: 0
DIARRHEA: 0
WHEEZING: 0
EYE PAIN: 0
PHOTOPHOBIA: 0
CHEST TIGHTNESS: 0

## 2022-05-05 NOTE — PROGRESS NOTES
Patient: Karen Henry                      Encounter Date: 5/5/2022    YOB: 1990                Age: 28 y.o. Chief Complaint   Patient presents with    Weight Management     F/u MWM- Qsymia         Patient identification was verified at the start of the visit. Patient-Reported Vitals 4/13/2022   Patient-Reported Weight 430   Patient-Reported Height 5'3\"   Patient-Reported Pulse -   Patient-Reported Temperature -   Patient-Reported SpO2 -         BP Readings from Last 1 Encounters:   03/03/22 128/86       BMI Readings from Last 1 Encounters:   05/05/22 75.18 kg/m²       Pulse Readings from Last 1 Encounters:   03/03/22 88       Wt Readings from Last 3 Encounters:   05/05/22 (!) 424 lb 6.4 oz (192.5 kg)   04/06/22 (!) 432 lb 12.8 oz (196.3 kg)   03/10/22 (!) 434 lb 9.6 oz (197.1 kg)       HPI: 31 y.o. female with a long-standing history of obesity presents today for virtual video follow-up. she has lost 6 pounds since her last visit. Current treatment includes Qsymia 7.5-46 mg daily and low carb/goyo diet. No issues. Food recall reviewed with the patient. Continuing to make good dietary choices.      Goal: BMI <65 prior to proceeding with sleeve gastrectomy (Dr. Almond Siemens)     Medication(s): Appetite well controlled?     [x]? ? Yes      []? ? No                          Focus:     [x]? ? Good     []? ? Fair     []? ? Poor                          Side effects? No        Any recent change in medication(s)? No     Exercise: []??Cardio     []? ? Resistance/strength training     [x]? ? Other: walking      No Known Allergies    No Known Allergies      Current Outpatient Medications:     Phentermine-Topiramate (QSYMIA) 7.5-46 MG CP24, Take 1 capsule by mouth daily for 30 days. , Disp: 30 capsule, Rfl: 0    Continuous Blood Gluc Sensor (FREESTYLE SHARDA 2 SENSOR) MISC, Every 2 week as needed, Disp: 2 each, Rfl: 3    Insulin Degludec (TRESIBA FLEXTOUCH) 200 UNIT/ML SOPN, INJECT 140 UNITS UNDER THE SKIN DAILY, Disp: 8 pen, Rfl: 3    Norgestim-Eth Estrad Triphasic 0.18/0.215/0.25 MG-35 MCG TABS, Take 1 tablet by mouth daily, Disp: 84 tablet, Rfl: 3    busPIRone (BUSPAR) 5 MG tablet, Take 5 mg by mouth 2 times daily, Disp: , Rfl:     empagliflozin (JARDIANCE) 10 MG tablet, Take 1 tablet by mouth daily, Disp: 90 tablet, Rfl: 1    vitamin D (ERGOCALCIFEROL) 1.25 MG (79034 UT) CAPS capsule, Take 1 capsule by mouth once a week, Disp: 8 capsule, Rfl: 0    lisinopril (PRINIVIL;ZESTRIL) 10 MG tablet, Take 1 tablet by mouth daily, Disp: 30 tablet, Rfl: 5    escitalopram (LEXAPRO) 20 MG tablet, Take 1 tablet by mouth daily, Disp: 30 tablet, Rfl: 5    insulin lispro, 1 Unit Dial, (HUMALOG KWIKPEN) 100 UNIT/ML SOPN, INJECT 20-24  UNITS SUBCUTANEOUSLY BEFORE MEALS 3 TIMES DAILY, Disp: 10 pen, Rfl: 5    Dulaglutide (TRULICITY) 3 RI/7.2YL SOPN, Inject 3 mg into the skin once a week, Disp: 13 pen, Rfl: 2    albuterol sulfate  (90 Base) MCG/ACT inhaler, Inhale 2 puffs into the lungs every 6 hours as needed for wheezing, Disp: 1 Inhaler, Rfl: 1    montelukast (SINGULAIR) 10 MG tablet, Take 1 tablet by mouth nightly, Disp: 30 tablet, Rfl: 5    Continuous Blood Gluc  (FREESTYLE SHARDA 2 READER) JERICA, As needed, Disp: 1 each, Rfl: 0    ondansetron (ZOFRAN) 8 MG tablet, Take 1 tablet by mouth every 8 hours as needed for Nausea, Disp: 24 tablet, Rfl: 1    cyclobenzaprine (FLEXERIL) 10 MG tablet, Take 1 po q 8 hours prn low back pain/ tension headaches., Disp: 45 tablet, Rfl: 2    Insulin Pen Needle (B-D UF III MINI PEN NEEDLES) 31G X 5 MM MISC, 4 times a day, Disp: 150 each, Rfl: 6    Patient Active Problem List   Diagnosis    Asthma    Polycystic ovarian disease    Obstructive sleep apnea    Pure hypercholesterolemia    Morbid obesity (HCC)    Allergic rhinitis    Acne    Diabetes mellitus (HCC)    PCOS (polycystic ovarian syndrome)    TAY (obstructive sleep apnea)    Obesity    Folic acid deficiency    Headache    Skin lesion    Major depressive disorder, single episode, mild (HCC)    Mass of subcutaneous tissue of back    Vitamin D deficiency    Depression with anxiety    Controlled type 2 diabetes mellitus with complication, with long-term current use of insulin (HCC)    Morbid obesity with BMI of 70 and over, adult [facility])       Review of Systems   Constitutional: Negative for fatigue. Eyes: Negative for photophobia, pain and visual disturbance. Respiratory: Negative for apnea, cough, choking, chest tightness, shortness of breath and wheezing. Cardiovascular: Negative for chest pain, palpitations and leg swelling. Gastrointestinal: Negative for abdominal distention, abdominal pain, blood in stool, constipation, diarrhea, nausea and vomiting. Endocrine: Negative for cold intolerance and heat intolerance. Musculoskeletal: Negative for arthralgias and myalgias. Skin: Negative for rash. Neurological: Negative for dizziness, tremors, syncope, weakness, numbness and headaches. Psychiatric/Behavioral: Negative for agitation, confusion, decreased concentration, dysphoric mood, hallucinations, sleep disturbance and suicidal ideas. The patient is not nervous/anxious and is not hyperactive. Physical Exam  Constitutional:       Appearance: She is well-developed. HENT:      Head: Normocephalic. Eyes:      Conjunctiva/sclera: Conjunctivae normal.   Abdominal:      General: Abdomen is protuberant. Musculoskeletal:         General: No swelling. Neurological:      Mental Status: She is alert and oriented to person, place, and time. Psychiatric:         Mood and Affect: Mood normal.         Behavior: Behavior normal.         Thought Content: Thought content normal.         Judgment: Judgment normal.         Office Visit on 03/03/2022   Component Date Value Ref Range Status    Hemoglobin A1C 03/03/2022 8.0  % Final         Assessment and Plan:  1.  Morbid obesity with BMI of 70 and over, adult [facility])  Improving. Continue current management. Reinforced low carb/goyo diet. Reviewed weight loss goals. F/u 4 weeks. - Phentermine-Topiramate (QSYMIA) 7.5-46 MG CP24; Take 1 capsule by mouth daily for 30 days. Dispense: 30 capsule; Refill: 0    2. Dietary counseling and surveillance  Low carb/goyo meal plan. Nutrition Plan: [] LCHF/Ketogenic   [x] Modified low-calorie diet (low carb/low-goyo)               [] Low-calorie diet    [] Maintenance       []Other        Exercise: [x] Cardio     [x] Resistance/strength training                       [x] ACSM recommendations (150 minutes/week in active weight loss)               Behavior: [x] Motivational interviewing performed    [] Referralfor counseling                         [x] Discussed strategies to overcome habits/challenges for focus         [] Stress management   [x] Stimulus control         [] Sleep hygiene      Reviewed:  [x] Nutrition and the importance of regular protein intake  [x] Hidden carbohydrate sources  [x] Alcohol use  [x] Tobacco use   [x] Drug use- Denies  [x] Importance of exercise and reducingsedentary time  [x] Treatment consent- Patient understands and agrees with the treatment plan   [x] Proper use of medication and side effects  [x] OARRS report      Controlled Substance Monitoring:    RX Monitoring 11/15/2017   Attestation The Prescription Monitoring Report for this patient was reviewed today. Periodic Controlled Substance Monitoring Possible medication side effects, risk of tolerance and/or dependence, and alternative treatments discussed. ;No signs of potential drug abuse or diversion identified.           Patient denies any history of cardiovascular disease (e.g., CAD, stroke, arrhythmias, CHF, uncontrolled HTN), seizure disorder, MAOI use within the last 2 weeks, hyperthyroidism, glaucoma, agitated states, history of drug abuse, pregnancy, nursing, known hypersensitivity to the prescribing meds, history of pancreatitis, or personal or family history of thyroid medullary cancer. Treatment start date: 3/1/22  12 weeks: 6/1/22  Starting weight: 440 pounds   Goal: At least 5% (21 pounds)  Total weight loss: 16 pounds    Key dietary points:    - Meats (preferably organic or grass fed) are great sources of protein and have no carbohydrates. - Recommend coconut, olive, avocado, or almond oils. - When buying dairy, choose regular or full fat options. - Choose vegetables that grow above ground as they are generally lower in carbohydrates and higher in fiber.  - Avoid starches such as bread, rice, potatoes, pasta and all sources of simple sugars (desserts, soda, breakfast cereals). - Choose beverages that are calorie and sugar free. Reminder regarding weight loss medications: You must be seen in office every 2-4 weeks to haveyour prescriptions refilled. If you are off of your medication for longer than 7 days, you will not be able to restart the medication for at least 6 months. Always call our office to report any side effects. Females, it is your responsibility to obtain negative pregnancy tests each month. No orders of the defined types were placed in this encounter. No follow-ups on file. Rimma Loving is a 28 y.o. female being evaluated by a Virtual Visit (video visit) encounter to address concerns as mentioned above. A caregiver was present when appropriate. Due to this being a TeleHealth encounter (During [facility]-23 public health emergency), evaluation of the following organ systems was limited: Vitals/Constitutional/EENT/Resp/CV/GI//MS/Neuro/Skin/Heme-Lymph-Imm.   Pursuant to the emergency declaration under the [address], [address] [place] [address] [facility] authority and the [employer] Act, this Virtual Visit was conducted with patient's (and/or legal guardian's) consent, to reduce the patient's risk of exposure to COVID-19 and provide necessary medical care. The patient (and/or legal guardian) has also been advised to contact this office for worsening conditions or problems, and seek emergency medical treatment and/or call 911 if deemed necessary.

## 2022-05-06 PROCEDURE — 95811 POLYSOM 6/>YRS CPAP 4/> PARM: CPT | Performed by: INTERNAL MEDICINE

## 2022-05-09 ENCOUNTER — TELEPHONE (OUTPATIENT)
Dept: PULMONOLOGY | Age: 32
End: 2022-05-09

## 2022-05-10 ENCOUNTER — TELEPHONE (OUTPATIENT)
Dept: PULMONOLOGY | Age: 32
End: 2022-05-10

## 2022-05-23 ENCOUNTER — TELEPHONE (OUTPATIENT)
Dept: PULMONOLOGY | Age: 32
End: 2022-05-23

## 2022-05-23 RX ORDER — DULAGLUTIDE 3 MG/.5ML
INJECTION, SOLUTION SUBCUTANEOUS
Qty: 6 ML | Refills: 3 | Status: SHIPPED | OUTPATIENT
Start: 2022-05-23 | End: 2022-10-17

## 2022-05-23 NOTE — TELEPHONE ENCOUNTER
Requested Prescriptions     Pending Prescriptions Disp Refills    [facility] ZE/6.9MJ SOPN [Pharmacy Med Name: ULICITY 3 VJ/9.4 ML PEN] 6 mL      Sig: INJECT 3 MG UNDER THE SKIN ONCE WEEKLY     Last ov 1/24/22  Last lab 11/30/21  Next ov 6/14/22

## 2022-06-01 VITALS — BODY MASS INDEX: 75.68 KG/M2 | WEIGHT: 293 LBS

## 2022-06-02 ENCOUNTER — TELEMEDICINE (OUTPATIENT)
Dept: BARIATRICS/WEIGHT MGMT | Age: 32
End: 2022-06-02
Payer: COMMERCIAL

## 2022-06-02 ENCOUNTER — TELEPHONE (OUTPATIENT)
Dept: BARIATRICS/WEIGHT MGMT | Age: 32
End: 2022-06-02

## 2022-06-02 DIAGNOSIS — E66.01 MORBID OBESITY WITH BMI OF 70 AND OVER, ADULT (HCC): Primary | ICD-10-CM

## 2022-06-02 DIAGNOSIS — Z71.3 DIETARY COUNSELING AND SURVEILLANCE: ICD-10-CM

## 2022-06-02 PROCEDURE — 99214 OFFICE O/P EST MOD 30 MIN: CPT | Performed by: FAMILY MEDICINE

## 2022-06-02 ASSESSMENT — ENCOUNTER SYMPTOMS
BLOOD IN STOOL: 0
DIARRHEA: 0
CHEST TIGHTNESS: 0
SHORTNESS OF BREATH: 0
NAUSEA: 0
PHOTOPHOBIA: 0
COUGH: 0
ABDOMINAL DISTENTION: 0
WHEEZING: 0
CHOKING: 0
EYE PAIN: 0
VOMITING: 0
APNEA: 0
CONSTIPATION: 0
ABDOMINAL PAIN: 0

## 2022-06-02 NOTE — PROGRESS NOTES
Patient: Shayne Wiley                      Encounter Date: 6/2/2022    YOB: 1990                Age: 28 y.o. Chief Complaint   Patient presents with    Weight Management     F/u MWM         Patient identification was verified at the start of the visit. Patient-Reported Vitals 4/13/2022   Patient-Reported Weight 430   Patient-Reported Height 5'3\"   Patient-Reported Pulse -   Patient-Reported Temperature -   Patient-Reported SpO2 -         BP Readings from Last 1 Encounters:   03/03/22 128/86       BMI Readings from Last 1 Encounters:   06/01/22 75.68 kg/m²       Pulse Readings from Last 1 Encounters:   03/03/22 88     Wt Readings from Last 3 Encounters:   06/01/22 (!) 427 lb 3.2 oz (193.8 kg)   05/05/22 (!) 424 lb 6.4 oz (192.5 kg)   04/06/22 (!) 432 lb 12.8 oz (196.3 kg)        HPI: 31 y.o. female with a long-standing history of obesity presents today for virtual video follow-up. She has gained 3 pounds since her last visit. Current treatment includes Qsymia 7.5-46 mg daily and low carb/goyo diet. Got off track with diet for a few days due to increased stress. Ready to get back to focus.      Goal: BMI <65 prior to proceeding with sleeve gastrectomy (Dr. Amanda Matta)     Medication(s): Appetite well controlled?     [x]? ? ? Yes      []? ? ? No                          Focus:     []? ? ?Good     [x]? ? ? Fair     []? ?? Poor                          Side effects? No        Any recent change in medication(s)? No     Exercise: []? ??Cardio     []? ? ? Resistance/strength training     [x]? ? ? Other: walking        No Known Allergies      Current Outpatient Medications:     Phentermine-Topiramate 11.25-69 MG CP24, Take 1 capsule by mouth daily for 14 days. , Disp: 14 capsule, Rfl: 0    Phentermine-Topiramate 15-92 MG CP24, Take 1 capsule by mouth daily for 30 days. , Disp: 30 capsule, Rfl: 0    TRULICITY 3 MG/2.9HR SOPN, INJECT 3 MG UNDER THE SKIN ONCE WEEKLY, Disp: 6 mL, Rfl: 3    Continuous Blood Gluc Sensor (FREESTYLE SHARDA 2 SENSOR) MISC, Every 2 week as needed, Disp: 2 each, Rfl: 3    Insulin Degludec (TRESIBA FLEXTOUCH) 200 UNIT/ML SOPN, INJECT 140 UNITS UNDER THE SKIN DAILY, Disp: 8 pen, Rfl: 3    Norgestim-Eth Estrad Triphasic 0.18/0.215/0.25 MG-35 MCG TABS, Take 1 tablet by mouth daily, Disp: 84 tablet, Rfl: 3    busPIRone (BUSPAR) 5 MG tablet, Take 5 mg by mouth 2 times daily, Disp: , Rfl:     empagliflozin (JARDIANCE) 10 MG tablet, Take 1 tablet by mouth daily, Disp: 90 tablet, Rfl: 1    vitamin D (ERGOCALCIFEROL) 1.25 MG (16160 UT) CAPS capsule, Take 1 capsule by mouth once a week, Disp: 8 capsule, Rfl: 0    lisinopril (PRINIVIL;ZESTRIL) 10 MG tablet, Take 1 tablet by mouth daily, Disp: 30 tablet, Rfl: 5    escitalopram (LEXAPRO) 20 MG tablet, Take 1 tablet by mouth daily, Disp: 30 tablet, Rfl: 5    insulin lispro, 1 Unit Dial, (HUMALOG KWIKPEN) 100 UNIT/ML SOPN, INJECT 20-24  UNITS SUBCUTANEOUSLY BEFORE MEALS 3 TIMES DAILY, Disp: 10 pen, Rfl: 5    albuterol sulfate  (90 Base) MCG/ACT inhaler, Inhale 2 puffs into the lungs every 6 hours as needed for wheezing, Disp: 1 Inhaler, Rfl: 1    montelukast (SINGULAIR) 10 MG tablet, Take 1 tablet by mouth nightly, Disp: 30 tablet, Rfl: 5    Continuous Blood Gluc  (FREESTYLE SHARDA 2 READER) JERICA, As needed, Disp: 1 each, Rfl: 0    ondansetron (ZOFRAN) 8 MG tablet, Take 1 tablet by mouth every 8 hours as needed for Nausea, Disp: 24 tablet, Rfl: 1    cyclobenzaprine (FLEXERIL) 10 MG tablet, Take 1 po q 8 hours prn low back pain/ tension headaches., Disp: 45 tablet, Rfl: 2    Insulin Pen Needle (B-D UF III MINI PEN NEEDLES) 31G X 5 MM MISC, 4 times a day, Disp: 150 each, Rfl: 6    Patient Active Problem List   Diagnosis    Asthma    Polycystic ovarian disease    Obstructive sleep apnea    Pure hypercholesterolemia    Morbid obesity (HCC)    Allergic rhinitis    Acne    Diabetes mellitus (HCC)    PCOS (polycystic ovarian syndrome)    TAY (obstructive sleep apnea)    Obesity    Folic acid deficiency    Headache    Skin lesion    Major depressive disorder, single episode, mild (HCC)    Mass of subcutaneous tissue of back    Vitamin D deficiency    Depression with anxiety    Controlled type 2 diabetes mellitus with complication, with long-term current use of insulin (HCC)    Morbid obesity with BMI of 70 and over, adult [facility])       Review of Systems   Constitutional: Negative for fatigue. Eyes: Negative for photophobia, pain and visual disturbance. Respiratory: Negative for apnea, cough, choking, chest tightness, shortness of breath and wheezing. Cardiovascular: Negative for chest pain, palpitations and leg swelling. Gastrointestinal: Negative for abdominal distention, abdominal pain, blood in stool, constipation, diarrhea, nausea and vomiting. Endocrine: Negative for cold intolerance and heat intolerance. Musculoskeletal: Negative for arthralgias and myalgias. Skin: Negative for rash. Neurological: Negative for dizziness, tremors, syncope, weakness, numbness and headaches. Psychiatric/Behavioral: Negative for agitation, confusion, decreased concentration, dysphoric mood, hallucinations, sleep disturbance and suicidal ideas. The patient is not nervous/anxious and is not hyperactive. Physical Exam  Constitutional:       Appearance: She is well-developed. HENT:      Head: Normocephalic. Eyes:      Conjunctiva/sclera: Conjunctivae normal.   Abdominal:      General: Abdomen is protuberant. Musculoskeletal:         General: No swelling. Neurological:      Mental Status: She is alert and oriented to person, place, and time. Psychiatric:         Mood and Affect: Mood normal.         Behavior: Behavior normal.         Thought Content:  Thought content normal.         Judgment: Judgment normal.         Office Visit on 03/03/2022   Component Date Value Ref Range Status    Hemoglobin A1C 03/03/2022 8.0  % Final Assessment and Plan:  1. Morbid obesity with BMI of 70 and over, adult [facility])  Did not meet her 12-week weight loss goal.    Gaining weight. Once again, discussed risks and benefits of Qsymia. Patient meets BMI criteria, confirms negative pregnancy status monthly (when applicable), denies glaucoma, kidney or liver impairment, hyperthyroidism, MAOI use within the past 14 days and has no known hypersensitivity to the sympathomimetic amines. Increase Qsymia to 11.25-69 mg daily. Counseled on proper use and potential side effects. Explained to patient this medication will need to be tapered when she is ready to discontinue it. she understands that abrupt cessation of this dose may cause adverse reactions including seizures. she understands that it is her responsibility to make sure that she does not run out of medications and to follow up to her appointments every 24 weeks as recommended. Heavily counseled on the importance of therapeutic lifestyle changes through diet and exercise. - Phentermine-Topiramate 11.25-69 MG CP24; Take 1 capsule by mouth daily for 14 days. Dispense: 14 capsule; Refill: 0  - Phentermine-Topiramate 15-92 MG CP24; Take 1 capsule by mouth daily for 30 days. Dispense: 30 capsule; Refill: 0    2. Dietary counseling and surveillance  1500-Garrett/low carb meal plan.           Nutrition Plan: [] LCHF/Ketogenic   [x] Modified low-calorie diet (low carb/low-garrett)               [] Low-calorie diet    [] Maintenance       []Other        Exercise: [x] Cardio     [] Resistance/strength training                       [x] ACSM recommendations (150 minutes/week in active weight loss)               Behavior: [x] Motivational interviewing performed    [] Referral for counseling                         [x] Discussed strategies to overcome habits/challenges for focus         [] Stress management   [x] Stimulus control         [] Sleep hygiene      Reviewed:  [x] Nutrition and the importance of regular protein intake  [x] Hidden carbohydrate sources  [x] Alcohol use  [x] Tobacco use   [x] Drug use- Denies  [x] Importance of exercise and reducing sedentary time  [x] Treatment consent- Patient understands and agrees with the treatment plan   [x] Proper use of medication and side effects  [x] OARRS report      Controlled Substance Monitoring:    RX Monitoring 11/15/2017   Attestation The Prescription Monitoring Report for this patient was reviewed today. Periodic Controlled Substance Monitoring Possible medication side effects, risk of tolerance and/or dependence, and alternative treatments discussed. ;No signs of potential drug abuse or diversion identified. Patient denies any history of cardiovascular disease (e.g., CAD, stroke, arrhythmias, CHF, uncontrolled HTN), seizure disorder, MAOI use within the last 2 weeks, hyperthyroidism, glaucoma, agitated states, history of drug abuse, pregnancy, nursing, known hypersensitivity to the prescribing meds, history of pancreatitis, or personal or family history of thyroid medullary cancer. New treatment start date: 6/6/22  12 weeks: 9/6/22  Starting weight: 427 pounds   Goal: At least 21 pounds     Treatment start date: 3/1/22  12 weeks: 6/1/22  Starting weight: 440 pounds   Goal: At least 5% (22 pounds)- goal not met   Total weight loss: 13 pounds    Key dietary points:    - Meats (preferably organic or grass fed) are great sources of protein and have no carbohydrates. - Recommend coconut, olive, avocado, or almond oils. - When buying dairy, choose regular or full fat options. - Choose vegetables that grow above ground as they are generally lower in carbohydrates and higher in fiber.  - Avoid starches such as bread, rice, potatoes, pasta and all sources of simple sugars (desserts, soda, breakfast cereals). - Choose beverages that are calorie and sugar free. Reminder regarding weight loss medications:     You must be seen in office every 2-4 weeks to haveyour prescriptions refilled. If you are off of your medication for longer than 7 days, you will not be able to restart the medication for at least 6 months. Always call our office to report any side effects. Females, it is your responsibility to obtain negative pregnancy tests each month. No orders of the defined types were placed in this encounter. No follow-ups on file. Rosemary Sam is a 28 y.o. female being evaluated by a Virtual Visit (video visit) encounter to address concerns as mentioned above. A caregiver was present when appropriate. Due to this being a TeleHealth encounter (During [facility]-49 public health emergency), evaluation of the following organ systems was limited: Vitals/Constitutional/EENT/Resp/CV/GI//MS/Neuro/Skin/Heme-Lymph-Imm. Pursuant to the emergency declaration under the [address] [place] [address], [address] [place] [address] authority and the [employer] Act, this Virtual Visit was conducted with patient's (and/or legal guardian's) consent, to reduce the patient's risk of exposure to COVID-19 and provide necessary medical care. The patient (and/or legal guardian) has also been advised to contact this office for worsening conditions or problems, and seek emergency medical treatment and/or call 911 if deemed necessary.

## 2022-06-02 NOTE — TELEPHONE ENCOUNTER
Patient instructed to contact our office once she has received her higher dose Qsymia via mail order pharmacy to schedule     Needs to schedule 2 wk follow-up with Dr. Renny Hurt

## 2022-06-07 ENCOUNTER — TELEPHONE (OUTPATIENT)
Dept: PULMONOLOGY | Age: 32
End: 2022-06-07

## 2022-06-12 RX ORDER — ESCITALOPRAM OXALATE 20 MG/1
TABLET ORAL
Qty: 30 TABLET | Refills: 5 | Status: SHIPPED | OUTPATIENT
Start: 2022-06-12

## 2022-06-14 ENCOUNTER — TELEPHONE (OUTPATIENT)
Dept: ADMINISTRATIVE | Age: 32
End: 2022-06-14

## 2022-06-22 ENCOUNTER — TELEMEDICINE (OUTPATIENT)
Dept: PSYCHOLOGY | Age: 32
End: 2022-06-22
Payer: COMMERCIAL

## 2022-06-22 DIAGNOSIS — F33.0 MILD EPISODE OF RECURRENT MAJOR DEPRESSIVE DISORDER (HCC): ICD-10-CM

## 2022-06-22 DIAGNOSIS — F41.9 ANXIETY: Primary | ICD-10-CM

## 2022-06-22 PROCEDURE — 98968 PH1 ASSMT&MGMT NQHP 21-30: CPT | Performed by: PSYCHOLOGIST

## 2022-06-22 NOTE — PROGRESS NOTES
Behavioral Health Consultation  Sheryle Lamp, Psy.D. Psychologist  6/22/2022  4-4:30 PM EDT      Time spent with Patient: 30 minutes  This is patient's third [facility] appointment for this episode of care. Reason for Consult: Depression, anxiety  Referring Provider: MD Nirav Abad [phone] [facility] [address] [facility] (During KTJVU-19 public health emergency)  }  Padmini Doll was evaluated through a synchronous (real-time) audio-video encounter. The patient (or guardian, if applicable) is aware that this is a billable service, which includes applicable co-pays. This Virtual Visit was conducted with patient's (and/or legal guardian's) consent. The visit was conducted pursuant to the emergency declaration under the [address] [place] [address], [address] [place] [address] authority and the [employer] Act. Patient identification was verified, and a caregiver was present when appropriate. The patient was located in a state where the provider was licensed to provide care. Conducted a risk-benefit analysis and determined that the patient's presenting problems are consistent with the use of telepsychology. Determined that the patient has sufficient knowledge and skills in the use of technology enabling them to adequately benefit from telepsychology. It was determined that this patient was able to be properly treated without an in-person session. Patient verified that they were currently located at the address that was provided during registration. Verified the following information:  Patient's identification: Yes  Patient location: Work @ [place] [address]  Patient's call back number: [phone]  Patient's emergency contact's name and number, as well as permission to contact them if needed:  Extended Emergency Contact Information  Primary Emergency Contact: Lupe Vides  Address: [address] [facility], Melanie Carreon [address] Phone: [phone]  Mobile Phone: [phone]  Relation: Parent  Secondary Emergency Contact: Charlee Skaggs  Address: [place] [address] Phone: [phone]  Relation: Parent    Provider location: Koki Jacobo:  Pt has had a rough couple weeks. 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 More stressed with work and struggling with her health goals. Trying not to overthink things. Increased anxiety and depression lately that is lasting longer. Taking Lexapro 20mg daily and BuSpar 5mg BID. O:  Interventions:  -Supportive techniques  -Processed experiences / stressors / concerns  -Reinforced efforts towards self-care  -Engaged in cognitive restructuring  -Introduced IFS and parts work      A:  MSE:  Appearance: Not assessed  Attitude: cooperative and friendly  Consciousness: alert  Orientation: oriented to person, place, time, general circumstance  Memory: recent and remote memory intact  Attention/Concentration: intact during session  Psychomotor Activity: Not assessed  Eye Contact: Not assessed  Speech: normal rate and volume, well-articulated  Mood: mildly dysphoric and anxious  Affect: congruent  Perception: within normal limits  Thought Content: within normal limits  Thought Process: logical, coherent and goal-directed  Insight: good  Judgment: intact  Ability to understand instructions: Yes  Ability to respond meaningfully: Yes  Morbid Ideation: no   Suicide Assessment: no suicidal ideation, plan, or intent. Appropriate for outpatient / telehealth care at this time.   Homicidal Ideation: no      PHQ Scores 2022 2018 2017 3/24/2016 3/14/2016   PHQ2 Score 4 2 0 3 4   PHQ9 Score 19 2 0 9 14     Interpretation of Total Score Depression Severity: 1-4 = Minimal depression, 5-9 = Mild depression, 10-14 = Moderate depression, 15-19 = Moderately severe depression, 20-27 = Severe depression    Diagnosis:    1. Anxiety    2. Mild episode of recurrent major depressive disorder [facility])        Patient Active Problem List   Diagnosis    Asthma    Polycystic ovarian disease    Obstructive sleep apnea    Pure hypercholesterolemia    Morbid obesity ([facility]    Allergic rhinitis    Acne    Diabetes mellitus ([facility]    PCOS (polycystic ovarian syndrome)    TAY (obstructive sleep apnea)    Obesity    Folic acid deficiency    Headache    Skin lesion    Major depressive disorder, single episode, mild (HCC)    Mass of subcutaneous tissue of back    Vitamin D deficiency    Depression with anxiety    Controlled type 2 diabetes mellitus with complication, with long-term current use of insulin ([facility]    Morbid obesity with BMI of 70 and over, adult ([facility]         Plan:  Pt interventions:  Supportive techniques, Provided Psychoeducation re: see above, Emphasized self-care as important for managing overall health and Cognitive strategies to target balanced thinking. Pt Behavioral Change Plan:  Pt set the following goals:  1. Return for a F/U virtual visit.

## 2022-06-24 ENCOUNTER — TELEMEDICINE (OUTPATIENT)
Dept: BARIATRICS/WEIGHT MGMT | Age: 32
End: 2022-06-24
Payer: COMMERCIAL

## 2022-06-24 VITALS — BODY MASS INDEX: 51.91 KG/M2 | HEIGHT: 63 IN | WEIGHT: 293 LBS

## 2022-06-24 DIAGNOSIS — Z71.3 DIETARY COUNSELING AND SURVEILLANCE: ICD-10-CM

## 2022-06-24 DIAGNOSIS — E66.01 MORBID OBESITY WITH BMI OF 70 AND OVER, ADULT (HCC): Primary | ICD-10-CM

## 2022-06-24 PROCEDURE — 99214 OFFICE O/P EST MOD 30 MIN: CPT | Performed by: FAMILY MEDICINE

## 2022-06-24 NOTE — PROGRESS NOTES
Patient: Stephanie Motta                      Encounter Date: 6/24/2022    YOB: 1990                Age: 28 y.o. Chief Complaint   Patient presents with    Weight Management     F/u MWM         Patient identification was verified at the start of the visit. Patient-Reported Vitals 4/13/2022   Patient-Reported Weight 430   Patient-Reported Height 5'3\"   Patient-Reported Pulse -   Patient-Reported Temperature -   Patient-Reported SpO2 -         BP Readings from Last 1 Encounters:   03/03/22 128/86       BMI Readings from Last 1 Encounters:   06/24/22 73.90 kg/m²       Pulse Readings from Last 1 Encounters:   03/03/22 88           Wt Readings from Last 3 Encounters:   06/24/22 (!) 417 lb 3.2 oz (189.2 kg)   06/01/22 (!) 427 lb 3.2 oz (193.8 kg)   05/05/22 (!) 424 lb 6.4 oz (192.5 kg)       HPI: 28 y.o. female with a long-standing history of obesity presents today for virtual video follow-up. she has lost 10 pounds since her last visit. Current treatment includes Qsymia 11.25-69. Tolerating it well. Food recall reviewed with the patient. Making better dietary choices. Happy with progress. Feels more appetite suppression from the higher dose of Qsymia. Motivated to continue losing weight. Medication(s): Appetite well controlled? [x]Yes      []No    Focus:     [x]Good     []Fair     []Poor    Side effects? No        Any recent change in medication(s)? No     Exercise: []Cardio     []Resistance/strength training     [x]Other: Walking   No Known Allergies      Current Outpatient Medications:     escitalopram (LEXAPRO) 20 MG tablet, TAKE ONE TABLET BY MOUTH DAILY, Disp: 30 tablet, Rfl: 5    Phentermine-Topiramate 15-92 MG CP24, Take 1 capsule by mouth daily for 30 days. , Disp: 30 capsule, Rfl: 0    TRULICITY 3 OY/7.3DI SOPN, INJECT 3 MG UNDER THE SKIN ONCE WEEKLY, Disp: 6 mL, Rfl: 3    Continuous Blood Gluc Sensor (FREESTYLE SHARDA 2 SENSOR) MISC, Every 2 week as needed, Disp: 2 each, Rfl: 3   Insulin Degludec (TRESIBA FLEXTOUCH) 200 UNIT/ML SOPN, INJECT 140 UNITS UNDER THE SKIN DAILY, Disp: 8 pen, Rfl: 3    Norgestim-Eth Estrad Triphasic 0.18/0.215/0.25 MG-35 MCG TABS, Take 1 tablet by mouth daily, Disp: 84 tablet, Rfl: 3    busPIRone (BUSPAR) 5 MG tablet, Take 5 mg by mouth 2 times daily, Disp: , Rfl:     empagliflozin (JARDIANCE) 10 MG tablet, Take 1 tablet by mouth daily, Disp: 90 tablet, Rfl: 1    vitamin D (ERGOCALCIFEROL) 1.25 MG (43472 UT) CAPS capsule, Take 1 capsule by mouth once a week, Disp: 8 capsule, Rfl: 0    lisinopril (PRINIVIL;ZESTRIL) 10 MG tablet, Take 1 tablet by mouth daily, Disp: 30 tablet, Rfl: 5    insulin lispro, 1 Unit Dial, (HUMALOG KWIKPEN) 100 UNIT/ML SOPN, INJECT 20-24  UNITS SUBCUTANEOUSLY BEFORE MEALS 3 TIMES DAILY, Disp: 10 pen, Rfl: 5    albuterol sulfate  (90 Base) MCG/ACT inhaler, Inhale 2 puffs into the lungs every 6 hours as needed for wheezing, Disp: 1 Inhaler, Rfl: 1    montelukast (SINGULAIR) 10 MG tablet, Take 1 tablet by mouth nightly, Disp: 30 tablet, Rfl: 5    Continuous Blood Gluc  (FREESTYLE SHARDA 2 READER) JERICA, As needed, Disp: 1 each, Rfl: 0    ondansetron (ZOFRAN) 8 MG tablet, Take 1 tablet by mouth every 8 hours as needed for Nausea, Disp: 24 tablet, Rfl: 1    cyclobenzaprine (FLEXERIL) 10 MG tablet, Take 1 po q 8 hours prn low back pain/ tension headaches., Disp: 45 tablet, Rfl: 2    Insulin Pen Needle (B-D UF III MINI PEN NEEDLES) 31G X 5 MM MISC, 4 times a day, Disp: 150 each, Rfl: 6    Patient Active Problem List   Diagnosis    Asthma    Polycystic ovarian disease    Obstructive sleep apnea    Pure hypercholesterolemia    Morbid obesity (HCC)    Allergic rhinitis    Acne    Diabetes mellitus (HCC)    PCOS (polycystic ovarian syndrome)    TAY (obstructive sleep apnea)    Obesity    Folic acid deficiency    Headache    Skin lesion    Major depressive disorder, single episode, mild (HCC)    Mass of subcutaneous tissue of back    Vitamin D deficiency    Depression with anxiety    Controlled type 2 diabetes mellitus with complication, with long-term current use of insulin (HCC)    Morbid obesity with BMI of 70 and over, adult [facility])       Review of Systems   Constitutional: Negative for fatigue. Eyes: Negative for photophobia, pain and visual disturbance. Respiratory: Negative for apnea, cough, choking, chest tightness, shortness of breath and wheezing. Cardiovascular: Negative for chest pain, palpitations and leg swelling. Gastrointestinal: Negative for abdominal distention, abdominal pain, blood in stool, constipation, diarrhea, nausea and vomiting. Endocrine: Negative for cold intolerance and heat intolerance. Musculoskeletal: Negative for arthralgias and myalgias. Skin: Negative for rash. Neurological: Negative for dizziness, tremors, syncope, weakness, numbness and headaches. Psychiatric/Behavioral: Negative for agitation, confusion, decreased concentration, dysphoric mood, hallucinations, sleep disturbance and suicidal ideas. The patient is not nervous/anxious and is not hyperactive. Physical Exam  Constitutional:       Appearance: She is well-developed. HENT:      Head: Normocephalic. Eyes:      Conjunctiva/sclera: Conjunctivae normal.   Abdominal:      General: Abdomen is protuberant. Musculoskeletal:         General: No swelling. Neurological:      Mental Status: She is alert and oriented to person, place, and time. Psychiatric:         Mood and Affect: Mood normal.         Behavior: Behavior normal.         Thought Content: Thought content normal.         Judgment: Judgment normal.         Office Visit on 03/03/2022   Component Date Value Ref Range Status    Hemoglobin A1C 03/03/2022 8.0  % Final         Assessment and Plan:  1. Morbid obesity with BMI of 70 and over, adult ([address]  Improving, but not at goal.    Once again, discussed risks and benefits of Qsymia. Patient meets BMI criteria, confirms negative pregnancy status monthly (when applicable), denies glaucoma, kidney or liver impairment, hyperthyroidism, MAOI use within the past 14 days and has no known hypersensitivity to the sympathomimetic amines. Increase Qsymia to 15-92 mg daily. Counseled on proper use and potential side effects. Explained to patient this medication will need to be tapered when she is ready to discontinue it. she understands that abrupt cessation of this dose may cause adverse reactions including seizures. she understands that it is her responsibility to make sure that she does not run out of medications and to follow up to her appointments every 24 weeks as recommended. Heavily counseled on the importance of therapeutic lifestyle changes through diet and exercise. 2. Dietary counseling and surveillance  General low carb/goyo guidelines provided.           Nutrition Plan: [] LCHF/Ketogenic   [x] Modified low-calorie diet (low carb/low-goyo)               [] Low-calorie diet    [] Maintenance       []Other        Exercise: [x] Cardio     [x] Resistance/strength training                       [x] ACSM recommendations (150 minutes/week in active weight loss)               Behavior: [x] Motivational interviewing performed    [] Referral for counseling                         [x] Discussed strategies to overcome habits/challenges for focus         [] Stress management   [x] Stimulus control         [] Sleep hygiene      Reviewed:  [x] Nutrition and the importance of regular protein intake  [x] Hidden carbohydrate sources  [x] Alcohol use  [x] Tobacco use   [x] Drug use- Denies  [x] Importance of exercise and reducing sedentary time  [x] Treatment consent- Patient understands and agrees with the treatment plan   [x] Proper use of medication and side effects  [x] OARRS report      Controlled Substance Monitoring:    RX Monitoring 11/15/2017   Attestation The Prescription Monitoring Report for this patient was reviewed today. Periodic Controlled Substance Monitoring Possible medication side effects, risk of tolerance and/or dependence, and alternative treatments discussed. ;No signs of potential drug abuse or diversion identified. Patient denies any history of cardiovascular disease (e.g., CAD, stroke, arrhythmias, CHF, uncontrolled HTN), seizure disorder, MAOI use within the last 2 weeks, hyperthyroidism, glaucoma, agitated states, history of drug abuse, pregnancy, nursing, known hypersensitivity to the prescribing meds, history of pancreatitis, or personal or family history of thyroid medullary cancer. New treatment start date: 6/6/22  12 weeks: 9/6/22  Starting weight: 427 pounds   Goal: At least 21 pounds   Total weight loss: 10 pounds       Key dietary points:    - Meats (preferably organic or grass fed) are great sources of protein and have no carbohydrates. - Recommend coconut, olive, avocado, or almond oils. - When buying dairy, choose regular or full fat options. - Choose vegetables that grow above ground as they are generally lower in carbohydrates and higher in fiber.  - Avoid starches such as bread, rice, potatoes, pasta and all sources of simple sugars (desserts, soda, breakfast cereals). - Choose beverages that are calorie and sugar free. Reminder regarding weight loss medications: You must be seen in office every 2-4 weeks to haveyour prescriptions refilled. If you are off of your medication for longer than 7 days, you will not be able to restart the medication for at least 6 months. Always call our office to report any side effects. Females, it is your responsibility to obtain negative pregnancy tests each month. No orders of the defined types were placed in this encounter. No follow-ups on file. Som Faith is a 28 y.o. female being evaluated by a Virtual Visit (video visit) encounter to address concerns as mentioned above.   A caregiver was present when appropriate. Due to this being a TeleHealth encounter (During EHJVK-80 public health emergency), evaluation of the following organ systems was limited: Vitals/Constitutional/EENT/Resp/CV/GI//MS/Neuro/Skin/Heme-Lymph-Imm. Pursuant to the emergency declaration under the [address] [place] [address], [address] [place] [address] and the [employer] Act, this Virtual Visit was conducted with patient's (and/or legal guardian's) consent, to reduce the patient's risk of exposure to COVID-19 and provide necessary medical care. The patient (and/or legal guardian) has also been advised to contact this office for worsening conditions or problems, and seek emergency medical treatment and/or call 911 if deemed necessary.

## 2022-06-25 ASSESSMENT — ENCOUNTER SYMPTOMS
ABDOMINAL DISTENTION: 0
CONSTIPATION: 0
ABDOMINAL PAIN: 0
APNEA: 0
VOMITING: 0
PHOTOPHOBIA: 0
DIARRHEA: 0
COUGH: 0
SHORTNESS OF BREATH: 0
BLOOD IN STOOL: 0
NAUSEA: 0
CHOKING: 0
CHEST TIGHTNESS: 0
EYE PAIN: 0
WHEEZING: 0

## 2022-07-11 LAB — DIABETIC RETINOPATHY: NEGATIVE

## 2022-07-14 ENCOUNTER — OFFICE VISIT (OUTPATIENT)
Dept: FAMILY MEDICINE CLINIC | Age: 32
End: 2022-07-14
Payer: COMMERCIAL

## 2022-07-14 VITALS
DIASTOLIC BLOOD PRESSURE: 70 MMHG | TEMPERATURE: 95.5 F | BODY MASS INDEX: 71.21 KG/M2 | WEIGHT: 293 LBS | SYSTOLIC BLOOD PRESSURE: 134 MMHG

## 2022-07-14 DIAGNOSIS — Z79.4 TYPE 2 DIABETES MELLITUS WITHOUT COMPLICATION, WITH LONG-TERM CURRENT USE OF INSULIN (HCC): Primary | ICD-10-CM

## 2022-07-14 DIAGNOSIS — F32.0 MAJOR DEPRESSIVE DISORDER, SINGLE EPISODE, MILD (HCC): Chronic | ICD-10-CM

## 2022-07-14 DIAGNOSIS — E28.2 PCOS (POLYCYSTIC OVARIAN SYNDROME): ICD-10-CM

## 2022-07-14 DIAGNOSIS — E11.9 TYPE 2 DIABETES MELLITUS WITHOUT COMPLICATION, WITH LONG-TERM CURRENT USE OF INSULIN (HCC): Primary | ICD-10-CM

## 2022-07-14 DIAGNOSIS — G47.33 OSA (OBSTRUCTIVE SLEEP APNEA): ICD-10-CM

## 2022-07-14 DIAGNOSIS — E78.00 PURE HYPERCHOLESTEROLEMIA: ICD-10-CM

## 2022-07-14 DIAGNOSIS — E66.01 MORBID OBESITY (HCC): ICD-10-CM

## 2022-07-14 DIAGNOSIS — F41.8 DEPRESSION WITH ANXIETY: ICD-10-CM

## 2022-07-14 PROCEDURE — 3052F HG A1C>EQUAL 8.0%<EQUAL 9.0%: CPT | Performed by: FAMILY MEDICINE

## 2022-07-14 PROCEDURE — 99214 OFFICE O/P EST MOD 30 MIN: CPT | Performed by: FAMILY MEDICINE

## 2022-07-14 RX ORDER — BUSPIRONE HYDROCHLORIDE 7.5 MG/1
7.5 TABLET ORAL 2 TIMES DAILY
Qty: 60 TABLET | Refills: 5 | Status: SHIPPED | OUTPATIENT
Start: 2022-07-14

## 2022-07-14 ASSESSMENT — PATIENT HEALTH QUESTIONNAIRE - PHQ9
8. MOVING OR SPEAKING SO SLOWLY THAT OTHER PEOPLE COULD HAVE NOTICED. OR THE OPPOSITE, BEING SO FIGETY OR RESTLESS THAT YOU HAVE BEEN MOVING AROUND A LOT MORE THAN USUAL: 0
1. LITTLE INTEREST OR PLEASURE IN DOING THINGS: 0
SUM OF ALL RESPONSES TO PHQ9 QUESTIONS 1 & 2: 3
SUM OF ALL RESPONSES TO PHQ QUESTIONS 1-9: 6
2. FEELING DOWN, DEPRESSED OR HOPELESS: 2
1. LITTLE INTEREST OR PLEASURE IN DOING THINGS: 1
SUM OF ALL RESPONSES TO PHQ QUESTIONS 1-9: 0
SUM OF ALL RESPONSES TO PHQ QUESTIONS 1-9: 6
7. TROUBLE CONCENTRATING ON THINGS, SUCH AS READING THE NEWSPAPER OR WATCHING TELEVISION: 0
SUM OF ALL RESPONSES TO PHQ9 QUESTIONS 1 & 2: 0
10. IF YOU CHECKED OFF ANY PROBLEMS, HOW DIFFICULT HAVE THESE PROBLEMS MADE IT FOR YOU TO DO YOUR WORK, TAKE CARE OF THINGS AT HOME, OR GET ALONG WITH OTHER PEOPLE: 1
SUM OF ALL RESPONSES TO PHQ QUESTIONS 1-9: 0
3. TROUBLE FALLING OR STAYING ASLEEP: 1
SUM OF ALL RESPONSES TO PHQ QUESTIONS 1-9: 6
SUM OF ALL RESPONSES TO PHQ QUESTIONS 1-9: 0
5. POOR APPETITE OR OVEREATING: 0
6. FEELING BAD ABOUT YOURSELF - OR THAT YOU ARE A FAILURE OR HAVE LET YOURSELF OR YOUR FAMILY DOWN: 1
SUM OF ALL RESPONSES TO PHQ QUESTIONS 1-9: 0
2. FEELING DOWN, DEPRESSED OR HOPELESS: 0
SUM OF ALL RESPONSES TO PHQ QUESTIONS 1-9: 6
9. THOUGHTS THAT YOU WOULD BE BETTER OFF DEAD, OR OF HURTING YOURSELF: 0
4. FEELING TIRED OR HAVING LITTLE ENERGY: 1

## 2022-07-14 NOTE — PROGRESS NOTES
Here for f.u of diabetes mellitus, mood, obesity    Pt states that overall things are doing well, has been staying healthy and is doing well to track her blood sugars. Pt saw endo a few months ago and at that time her a1c was better but still high at 8. Pt remains on trulicity and also on tresiba, at 140 units nightly. Pt is seeing improvement in her blood sugars although they at times does get low. Pt does track using CGM and it is doing great for her. Here for f/u of cholesterol. Pt as been working on diet/exercise and is consistent with therapy. No side effects from current therapy. No chest pain, shortness of breath. No numbness/tingling in extremities     Pt does continue to follow with dr. Asael Lechuga for bariatrics and f/u of weight. Pt is on qsymia 15/92. Pt is doing well to stay consistent with her lifestyle, is eating better, exercising and taking qsymia. Pt feels that it is a combination of her lifestyle and meds. Pt is getting to exercise 3d/wk. Pt going to gym and does elliptical for 10 minutes and then other machines, as well as some weights. Pt is doing on her own, does not have . Pt does have a coworker that goes with her and that is helpful. Pt does follow reguarly with dr. Adelita Almaraz for her mood and does feel that the combination of her lexapro, buspar, seems to be doing ok. Pt states that her anxiety has been up a bit, does not find any trigger for her symptoms. Except as noted above in the history of present illness, the review of systems is  negative for headache, vision changes, chest pain, shortness of breath, abdominal pain, urinary sx, bowel changes. Past medical, surgical, and social history reviewed and updated  Medications and allergies reviewed and updated        O: /70   Temp (!) 95.5 °F (35.3 °C)   Wt (!) 402 lb (182.3 kg)   LMP 07/04/2022   BMI 71.21 kg/m²   GEN: No acute distress, cooperative, well nourished, alert.    HEENT: PEERLA, EOMI , normocephalic/atraumatic, nares and oropharynx clear. Mucous membranes normal, Tympanic membranes clear bilaterally. Neck: soft, supple, no thyromegaly, mass, no Lymphadenopathy  CV: Regular rate and rhythm, no murmur, rubs, gallops. No edema. Resp: Clear to auscultation bilaterally good air entry bilaterally  No crackles, wheeze. Breathing comfortably.    Psych: mood stable, No suicidal thoughts or ideation   Ext: no clubbing, cyanosis, edema        Current Outpatient Medications   Medication Sig Dispense Refill    busPIRone (BUSPAR) 7.5 MG tablet Take 1 tablet by mouth 2 times daily 60 tablet 5    escitalopram (LEXAPRO) 20 MG tablet TAKE ONE TABLET BY MOUTH DAILY 30 tablet 5    TRULICITY 3 KO/3.0OX SOPN INJECT 3 MG UNDER THE SKIN ONCE WEEKLY 6 mL 3    Continuous Blood Gluc Sensor (FREESTYLE SHARDA 2 SENSOR) MISC Every 2 week as needed 2 each 3    Insulin Degludec (TRESIBA FLEXTOUCH) 200 UNIT/ML SOPN INJECT 140 UNITS UNDER THE SKIN DAILY 8 pen 3    Norgestim-Eth Estrad Triphasic 0.18/0.215/0.25 MG-35 MCG TABS Take 1 tablet by mouth daily 84 tablet 3    empagliflozin (JARDIANCE) 10 MG tablet Take 1 tablet by mouth daily 90 tablet 1    vitamin D (ERGOCALCIFEROL) 1.25 MG (59923 UT) CAPS capsule Take 1 capsule by mouth once a week 8 capsule 0    lisinopril (PRINIVIL;ZESTRIL) 10 MG tablet Take 1 tablet by mouth daily 30 tablet 5    insulin lispro, 1 Unit Dial, (HUMALOG KWIKPEN) 100 UNIT/ML SOPN INJECT 20-24  UNITS SUBCUTANEOUSLY BEFORE MEALS 3 TIMES DAILY 10 pen 5    albuterol sulfate  (90 Base) MCG/ACT inhaler Inhale 2 puffs into the lungs every 6 hours as needed for wheezing 1 Inhaler 1    montelukast (SINGULAIR) 10 MG tablet Take 1 tablet by mouth nightly 30 tablet 5    Continuous Blood Gluc  (FREESTYLE SHARDA 2 READER) JERICA As needed 1 each 0    ondansetron (ZOFRAN) 8 MG tablet Take 1 tablet by mouth every 8 hours as needed for Nausea 24 tablet 1    cyclobenzaprine (FLEXERIL) 10 MG tablet Take 1 po q 8 hours prn low back pain/ tension headaches. 45 tablet 2    Insulin Pen Needle (B-D UF III MINI PEN NEEDLES) 31G X 5 MM MISC 4 times a day 150 each 6     No current facility-administered medications for this visit. ASSESSMENT / PLAN:    1. Type 2 diabetes mellitus without complication, with long-term current use of insulin (HCC)  Doing better, weight loss doing great  Check f/u bloodwork  Cont current meds  Cont CGM, consider dexcom G6 as she struggles to reach the sensor on her freestyle paris  - CBC; Future  - Comprehensive Metabolic Panel; Future  - Lipid Panel; Future  - Hemoglobin A1C; Future  - TSH; Future    2. Morbid obesity ([facility]  Doing well with lifestyle, exercise  Weight down 40# +    3. TAY (obstructive sleep apnea)  Cont Cpap therapy  Work on weight loss    4. Pure hypercholesterolemia  Check cmp, lipids    5. PCOS (polycystic ovarian syndrome)  stable    6. Depression with anxiety  Mild breakthru sx of anxiety  Discussed tx options. Cont f/u with dr. Adelita Almaraz  Increase buspar to 7.5mg BID    7. Major depressive disorder, single episode, mild ([facility]  As above             Follow-up appointment:   3 months  Pending bloodwork results  Prn     Discussed use, benefit, and side effects of all prescribed medications. Barriers to medication compliance addressed. All patient questions answered. Pt voiced understanding. When applicable, [facility]. The patient has signed appropriate paperworks/consents.

## 2022-07-15 ENCOUNTER — TELEMEDICINE (OUTPATIENT)
Dept: BARIATRICS/WEIGHT MGMT | Age: 32
End: 2022-07-15
Payer: COMMERCIAL

## 2022-07-15 DIAGNOSIS — E66.01 MORBID OBESITY WITH BMI OF 70 AND OVER, ADULT (HCC): Primary | ICD-10-CM

## 2022-07-15 DIAGNOSIS — Z71.3 DIETARY COUNSELING AND SURVEILLANCE: ICD-10-CM

## 2022-07-15 PROCEDURE — 99214 OFFICE O/P EST MOD 30 MIN: CPT | Performed by: FAMILY MEDICINE

## 2022-07-15 ASSESSMENT — ENCOUNTER SYMPTOMS
COUGH: 0
VOMITING: 0
SHORTNESS OF BREATH: 0
ABDOMINAL DISTENTION: 0
CHEST TIGHTNESS: 0
EYE PAIN: 0
DIARRHEA: 0
APNEA: 0
PHOTOPHOBIA: 0
WHEEZING: 0
BLOOD IN STOOL: 0
CONSTIPATION: 0
ABDOMINAL PAIN: 0
CHOKING: 0
NAUSEA: 0

## 2022-07-15 NOTE — PROGRESS NOTES
Patient: Whitley Vega                      Encounter Date: 7/15/2022    YOB: 1990                Age: 28 y.o. Chief Complaint   Patient presents with    Weight Management     F/u MWM             Patient identification was verified at the start of the visit. Patient-Reported Vitals 7/15/2022   Patient-Reported Weight 402   Patient-Reported Height -   Patient-Reported Systolic 408   Patient-Reported Diastolic 70   Patient-Reported Pulse -   Patient-Reported Temperature -   Patient-Reported SpO2 -         BP Readings from Last 1 Encounters:   07/27/22 125/81       BMI Readings from Last 1 Encounters:   07/27/22 73.43 kg/m²       Pulse Readings from Last 1 Encounters:   07/27/22 78          Wt Readings from Last 3 Encounters:   07/27/22 (!) 408 lb (185.1 kg)   07/14/22 (!) 402 lb (182.3 kg)   06/24/22 (!) 417 lb 3.2 oz (189.2 kg)        HPI: 28 y.o. female with a long-standing history of obesity presents today for virtual video follow-up. she has lost 15 pounds since her last visit. Current treatment includes Qsymia 15-92 mg daily and low carb/goyo diet. No issues. Food recall reviewed with the patient. Making good dietary choices. Happy with progress. Working towards lowering BMI <65 in order to proceed with bariatric surgery. Continues to be motivated. Medication(s): Appetite well controlled? [x]Yes      []No    Focus:     [x]Good     []Fair     []Poor    Side effects? No        Any recent change in medication(s)? No    Exercise: []Cardio     []Resistance/strength training     [x]Other: Walking     No Known Allergies      Current Outpatient Medications:     Phentermine-Topiramate 15-92 MG CP24, Take 1 capsule by mouth daily for 30 days. , Disp: 30 capsule, Rfl: 0    busPIRone (BUSPAR) 7.5 MG tablet, Take 1 tablet by mouth 2 times daily, Disp: 60 tablet, Rfl: 5    escitalopram (LEXAPRO) 20 MG tablet, TAKE ONE TABLET BY MOUTH DAILY, Disp: 30 tablet, Rfl: 5    TRULICITY 3 VT/8.0BY SOPN, INJECT 3 MG UNDER THE SKIN ONCE WEEKLY, Disp: 6 mL, Rfl: 3    Continuous Blood Gluc Sensor (FREESTYLE SHARDA 2 SENSOR) MISC, Every 2 week as needed, Disp: 2 each, Rfl: 3    Insulin Degludec (TRESIBA FLEXTOUCH) 200 UNIT/ML SOPN, INJECT 140 UNITS UNDER THE SKIN DAILY, Disp: 8 pen, Rfl: 3    Norgestim-Eth Estrad Triphasic 0.18/0.215/0.25 MG-35 MCG TABS, Take 1 tablet by mouth daily, Disp: 84 tablet, Rfl: 3    empagliflozin (JARDIANCE) 10 MG tablet, Take 1 tablet by mouth daily, Disp: 90 tablet, Rfl: 1    vitamin D (ERGOCALCIFEROL) 1.25 MG (23665 UT) CAPS capsule, Take 1 capsule by mouth once a week, Disp: 8 capsule, Rfl: 0    lisinopril (PRINIVIL;ZESTRIL) 10 MG tablet, Take 1 tablet by mouth daily, Disp: 30 tablet, Rfl: 5    insulin lispro, 1 Unit Dial, (HUMALOG KWIKPEN) 100 UNIT/ML SOPN, INJECT 20-24  UNITS SUBCUTANEOUSLY BEFORE MEALS 3 TIMES DAILY, Disp: 10 pen, Rfl: 5    albuterol sulfate  (90 Base) MCG/ACT inhaler, Inhale 2 puffs into the lungs every 6 hours as needed for wheezing, Disp: 1 Inhaler, Rfl: 1    montelukast (SINGULAIR) 10 MG tablet, Take 1 tablet by mouth nightly, Disp: 30 tablet, Rfl: 5    Continuous Blood Gluc  (FREESTYLE SHARDA 2 READER) JERICA, As needed, Disp: 1 each, Rfl: 0    ondansetron (ZOFRAN) 8 MG tablet, Take 1 tablet by mouth every 8 hours as needed for Nausea, Disp: 24 tablet, Rfl: 1    cyclobenzaprine (FLEXERIL) 10 MG tablet, Take 1 po q 8 hours prn low back pain/ tension headaches., Disp: 45 tablet, Rfl: 2    Insulin Pen Needle (B-D UF III MINI PEN NEEDLES) 31G X 5 MM MISC, 4 times a day, Disp: 150 each, Rfl: 6    Patient Active Problem List   Diagnosis    Asthma    Polycystic ovarian disease    Obstructive sleep apnea    Pure hypercholesterolemia    Morbid obesity (HCC)    Allergic rhinitis    Acne    Diabetes mellitus (HCC)    PCOS (polycystic ovarian syndrome)    TAY (obstructive sleep apnea)    Obesity    Folic acid deficiency    Headache    Skin lesion    Major depressive disorder, single episode, mild (HCC)    Mass of subcutaneous tissue of back    Vitamin D deficiency    Depression with anxiety    Controlled type 2 diabetes mellitus with complication, with long-term current use of insulin ([facility]    Morbid obesity with BMI of 70 and over, adult [facility])       Review of Systems   Constitutional:  Negative for fatigue. Eyes:  Negative for photophobia, pain and visual disturbance. Respiratory:  Negative for apnea, cough, choking, chest tightness, shortness of breath and wheezing. Cardiovascular:  Negative for chest pain, palpitations and leg swelling. Gastrointestinal:  Negative for abdominal distention, abdominal pain, blood in stool, constipation, diarrhea, nausea and vomiting. Endocrine: Negative for cold intolerance and heat intolerance. Musculoskeletal:  Negative for arthralgias and myalgias. Skin:  Negative for rash. Neurological:  Negative for dizziness, tremors, syncope, weakness, numbness and headaches. Psychiatric/Behavioral:  Negative for agitation, confusion, decreased concentration, dysphoric mood, hallucinations, sleep disturbance and suicidal ideas. The patient is not nervous/anxious and is not hyperactive. Physical Exam  Constitutional:       Appearance: She is well-developed. HENT:      Head: Normocephalic. Eyes:      Conjunctiva/sclera: Conjunctivae normal.   Abdominal:      General: Abdomen is protuberant. Musculoskeletal:         General: No swelling. Neurological:      Mental Status: She is alert and oriented to person, place, and time. Psychiatric:         Mood and Affect: Mood normal.         Behavior: Behavior normal.         Thought Content: Thought content normal.         Judgment: Judgment normal.       Abstract on 07/12/2022   Component Date Value Ref Range Status    Diabetic Retinopathy 07/11/2022 Negative   Final         Assessment and Plan:  1.  Morbid obesity with BMI of 70 and over, adult ([facility]  Improving, but not at weight goal.  Continue current management. Qsymia refilled. Reviewed weight loss goals and expectations. F/u 4 weeks. 2. Dietary counseling and surveillance  Low carb/goyo meal plan. Nutrition Plan: [] LCHF/Ketogenic   [x] Modified low-calorie diet (low carb/low-goyo)               [] Low-calorie diet    [] Maintenance       []Other        Exercise: [x] Cardio     [] Resistance/strength training                       [x] ACSM recommendations (150 minutes/week in active weight loss)               Behavior: [x] Motivational interviewing performed    [] Referral for counseling                         [x] Discussed strategies to overcome habits/challenges for focus         [] Stress management   [x] Stimulus control         [] Sleep hygiene      Reviewed:  [x] Nutrition and the importance of regular protein intake  [x] Hidden carbohydrate sources  [x] Alcohol use  [x] Tobacco use   [x] Drug use- Denies  [x] Importance of exercise and reducing sedentary time  [x] Treatment consent- Patient understands and agrees with the treatment plan   [x] Proper use of medication and side effects  [x] OARRS report        Controlled Substance Monitoring:    RX Monitoring 11/15/2017   Attestation The Prescription Monitoring Report for this patient was reviewed today. Periodic Controlled Substance Monitoring Possible medication side effects, risk of tolerance and/or dependence, and alternative treatments discussed. ;No signs of potential drug abuse or diversion identified. Patient denies any history of cardiovascular disease (e.g., CAD, stroke, arrhythmias, CHF, uncontrolled HTN), seizure disorder, MAOI use within the last 2 weeks, hyperthyroidism, glaucoma, agitated states, history of drug abuse, pregnancy, nursing, known hypersensitivity to the prescribing meds, history of pancreatitis, or personal or family history of thyroid medullary cancer.       New treatment start date: 6/6/22  12 weeks: 9/6/22  Starting weight: 427 pounds   Goal: At least 21 pounds   Total weight loss: 25 pounds     Key dietary points:    - Meats (preferably organic or grass fed) are great sources of protein and have no carbohydrates. - Recommend coconut, olive, avocado, or almond oils. - When buying dairy, choose regular or full fat options. - Choose vegetables that grow above ground as they are generally lower in carbohydrates and higher in fiber.  - Avoid starches such as bread, rice, potatoes, pasta and all sources of simple sugars (desserts, soda, breakfast cereals). - Choose beverages that are calorie and sugar free. Reminder regarding weight loss medications: You must be seen in office every 2-4 weeks to haveyour prescriptions refilled. If you are off of your medication for longer than 7 days, you will not be able to restart the medication for at least 6 months. Always call our office to report any side effects. Females, it is your responsibility to obtain negative pregnancy tests each month. No orders of the defined types were placed in this encounter. No follow-ups on file. Wander Hathaway is a 28 y.o. female being evaluated by a Virtual Visit (video visit) encounter to address concerns as mentioned above. A caregiver was present when appropriate. Due to this being a TeleHealth encounter (During VJDVY-45 public health emergency), evaluation of the following organ systems was limited: Vitals/Constitutional/EENT/Resp/CV/GI//MS/Neuro/Skin/Heme-Lymph-Imm. Pursuant to the emergency declaration under the [address] [place] [address], [address] [place] [address] authority and the [employer] and [employer] Act, this Virtual Visit was conducted with patient's (and/or legal guardian's) consent, to reduce the patient's risk of exposure to COVID-19 and provide necessary medical care.   The patient (and/or legal guardian) has also been advised to contact this office for worsening conditions or problems, and seek emergency medical treatment and/or call 911 if deemed necessary.

## 2022-07-25 ENCOUNTER — TELEMEDICINE (OUTPATIENT)
Dept: PSYCHOLOGY | Age: 32
End: 2022-07-25
Payer: COMMERCIAL

## 2022-07-25 DIAGNOSIS — F41.9 ANXIETY: Primary | ICD-10-CM

## 2022-07-25 DIAGNOSIS — F33.0 MILD EPISODE OF RECURRENT MAJOR DEPRESSIVE DISORDER (HCC): ICD-10-CM

## 2022-07-25 PROCEDURE — 90832 PSYTX W PT 30 MINUTES: CPT | Performed by: PSYCHOLOGIST

## 2022-07-25 NOTE — PROGRESS NOTES
Behavioral Health Consultation  Felisa Mckeon Psy.D. Psychologist  7/25/2022  4-4:30 PM EDT      Time spent with Patient: 30 minutes  This is patient's fourth [facility] appointment for this episode of care. Reason for Consult: Depression, anxiety  Referring Provider: Milton Sadler MD  [address] [facility] [address] (During YJSFB-19 public health emergency)  }  Tang Ford was evaluated through a synchronous (real-time) audio-video encounter. The patient (or guardian, if applicable) is aware that this is a billable service, which includes applicable co-pays. This Virtual Visit was conducted with patient's (and/or legal guardian's) consent. The visit was conducted pursuant to the emergency declaration under the [address] [place] [address], [address] [place] [address] authority and the [employer] and [employer] General Act. Patient identification was verified, and a caregiver was present when appropriate. The patient was located in a state where the provider was licensed to provide care. Conducted a risk-benefit analysis and determined that the patient's presenting problems are consistent with the use of telepsychology. Determined that the patient has sufficient knowledge and skills in the use of technology enabling them to adequately benefit from telepsychology. It was determined that this patient was able to be properly treated without an in-person session. Patient verified that they were currently located at the address that was provided during registration. Verified the following information:  Patient's identification: Yes  Patient location: Work @ [place] [address]  Patient's call back number: [phone]  Patient's emergency contact's name and number, as well as permission to contact them if needed:  Extended Emergency Contact Information  Primary Emergency Contact: Lupe Vides  [facility] [facility] [address] Phone: [phone]  Mobile Phone: [phone]  Relation: Parent  Secondary Emergency Contact: Clifford Dumont  Address: [place] [address] Phone: [phone]  Relation: Parent    Provider location: [place]:  Pt has been [place]. 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 Noticing herself feeling more disorganized and forgetful recently. Self-care has been more limited. Goes to the gym and does her nightly facial. Struggling more with prioritizing her own needs and setting boundaries. O:  Interventions:  -Supportive techniques  -Processed experiences / stressors / concerns  -Reinforced efforts towards self-care  -Engaged in parts work with people pleasing part      A:  MSE:  Appearance: good hygiene  and appropriate attire  Attitude: cooperative and friendly  Consciousness: alert  Orientation: oriented to person, place, time, general circumstance  Memory: recent and remote memory intact  Attention/Concentration: intact during session  Psychomotor Activity: normal  Eye Contact: normal  Speech: normal rate and volume, well-articulated  Mood: mildly dysphoric and anxious  Affect: congruent  Perception: within normal limits  Thought Content: within normal limits  Thought Process: logical, coherent and goal-directed  Insight: good  Judgment: intact  Ability to understand instructions: Yes  Ability to respond meaningfully: Yes  Morbid Ideation: no   Suicide Assessment: no suicidal ideation, plan, or intent. Appropriate for outpatient / telehealth care at this time.   Homicidal Ideation: no      PHQ Scores 7/14/2022 7/14/2022 1/24/2022 11/12/2018 9/25/2017 3/24/2016 3/14/2016   PHQ2 Score 3 0 4 2 0 3 4   PHQ9 Score 6 0 19 2 0 9 14     Interpretation of Total Score Depression Severity: 1-4 = Minimal depression, 5-9 = Mild depression, 10-14 = Moderate depression, 15-19 = Moderately severe depression, 20-27 = Severe depression    Diagnosis:    1. Anxiety    2. Mild episode of recurrent major depressive disorder [facility])          Patient Active Problem List   Diagnosis    Asthma    Polycystic ovarian disease    Obstructive sleep apnea    Pure hypercholesterolemia    Morbid obesity ([facility]    Allergic rhinitis    Acne    Diabetes mellitus (HCC)    PCOS (polycystic ovarian syndrome)    TAY (obstructive sleep apnea)    Obesity    Folic acid deficiency    Headache    Skin lesion    Major depressive disorder, single episode, mild (HCC)    Mass of subcutaneous tissue of back    Vitamin D deficiency    Depression with anxiety    Controlled type 2 diabetes mellitus with complication, with long-term current use of insulin ([facility]    Morbid obesity with BMI of 70 and over, adult [facility])         Plan:  Pt interventions:  Supportive techniques, Emphasized self-care as important for managing overall health, and Cognitive strategies to target balanced thinking . Pt Behavioral Change Plan:  Pt set the following goals:  1. Return for a F/U virtual visit.

## 2022-07-26 ENCOUNTER — TELEPHONE (OUTPATIENT)
Dept: ADMINISTRATIVE | Age: 32
End: 2022-07-26

## 2022-07-26 NOTE — TELEPHONE ENCOUNTER
I have received a PA request for  Dexcom G6  device. I did not see a script in the chart for this.  Please put a new script in for    Dexcom G6  device if you want the PA to be completed

## 2022-07-27 ENCOUNTER — OFFICE VISIT (OUTPATIENT)
Dept: BARIATRICS/WEIGHT MGMT | Age: 32
End: 2022-07-27
Payer: COMMERCIAL

## 2022-07-27 VITALS
SYSTOLIC BLOOD PRESSURE: 125 MMHG | HEIGHT: 63 IN | HEART RATE: 78 BPM | DIASTOLIC BLOOD PRESSURE: 81 MMHG | WEIGHT: 293 LBS | BODY MASS INDEX: 51.91 KG/M2

## 2022-07-27 DIAGNOSIS — Z79.4 TYPE 2 DIABETES MELLITUS WITHOUT COMPLICATION, WITH LONG-TERM CURRENT USE OF INSULIN (HCC): ICD-10-CM

## 2022-07-27 DIAGNOSIS — E11.9 TYPE 2 DIABETES MELLITUS WITHOUT COMPLICATION, WITH LONG-TERM CURRENT USE OF INSULIN (HCC): ICD-10-CM

## 2022-07-27 DIAGNOSIS — E66.01 MORBID OBESITY WITH BMI OF 70 AND OVER, ADULT (HCC): Primary | ICD-10-CM

## 2022-07-27 DIAGNOSIS — G47.33 OSA (OBSTRUCTIVE SLEEP APNEA): ICD-10-CM

## 2022-07-27 PROCEDURE — 99214 OFFICE O/P EST MOD 30 MIN: CPT | Performed by: SURGERY

## 2022-07-27 PROCEDURE — 3052F HG A1C>EQUAL 8.0%<EQUAL 9.0%: CPT | Performed by: SURGERY

## 2022-07-27 NOTE — PROGRESS NOTES
Catie Frazier gained 6 lbs since previous visit. Based on home scale. Pt was following with Dr Kimmie Saldivar for medical weight loss prior to continuing pre-surgical f/u. Pt cut out caffeine and carbonation - 1 decaf cup coffee q other day. Breakfast: overnight oats (ordered online) - flax, oatmilk/almond milk    Snack: None    Lunch: school cafeteria lunch + salad : (pastrami and swiss w/o bread) + banana + salad w/ ranch    Snack: None OR cashews/sunflower seeds OR slices apples OR salami and cream cheese    Dinner: corn, terrie steak (4 oz), small serving white rice    Snack: 2 meatballs    Snack: cookie d/t DYAN dropping (in 50s)    Is pt consuming smaller portions? Doing better with breakfast and lunch, harder at night. Is pt consuming at least 64 oz of fluids per day? yes , she is    Is pt consuming carbonated, caffeinated, or sugary beverages? no    Has pt sampled Unjury and/or Nectar protein? Daniel Benítez,     Has patient attended a support group?  Not scheduled     Exercise: Gym M/W/F for 1 hour and lo on Saturday mornings    Plan/Recommendations:   Hunger/fullness cues and mental part in overeating in the evening  Protein based foods each meal and snack  Reviewed pre-surg guidelines  Reviewed 1500 kcal meal plan to help with weight loss goal (BMI <65)    Handouts: pre-surg guidelines, 9 inch plate, protein based snacks    Michael Plasencia, RD, LD

## 2022-08-11 ENCOUNTER — TELEMEDICINE (OUTPATIENT)
Dept: BARIATRICS/WEIGHT MGMT | Age: 32
End: 2022-08-11
Payer: COMMERCIAL

## 2022-08-11 VITALS — BODY MASS INDEX: 51.91 KG/M2 | WEIGHT: 293 LBS | HEIGHT: 63 IN

## 2022-08-11 DIAGNOSIS — Z71.3 DIETARY COUNSELING AND SURVEILLANCE: ICD-10-CM

## 2022-08-11 DIAGNOSIS — E66.01 MORBID OBESITY WITH BMI OF 70 AND OVER, ADULT (HCC): Primary | ICD-10-CM

## 2022-08-11 PROCEDURE — 99214 OFFICE O/P EST MOD 30 MIN: CPT | Performed by: FAMILY MEDICINE

## 2022-08-11 ASSESSMENT — ENCOUNTER SYMPTOMS
COUGH: 0
NAUSEA: 0
CHOKING: 0
ABDOMINAL PAIN: 0
ABDOMINAL DISTENTION: 0
CONSTIPATION: 0
WHEEZING: 0
EYE PAIN: 0
CHEST TIGHTNESS: 0
APNEA: 0
PHOTOPHOBIA: 0
BLOOD IN STOOL: 0
SHORTNESS OF BREATH: 0
VOMITING: 0
DIARRHEA: 0

## 2022-08-11 NOTE — PROGRESS NOTES
Patient: Shantell Diaz                      Encounter Date: 8/11/2022    YOB: 1990                Age: 28 y.o. Chief Complaint   Patient presents with    Weight Management     F/u MWM          Patient identification was verified at the start of the visit. Patient-Reported Vitals 7/15/2022   Patient-Reported Weight 402   Patient-Reported Height -   Patient-Reported Systolic 623   Patient-Reported Diastolic 70   Patient-Reported Pulse -   Patient-Reported Temperature -   Patient-Reported SpO2 -         BP Readings from Last 1 Encounters:   07/27/22 125/81       BMI Readings from Last 1 Encounters:   08/11/22 72.79 kg/m²       Pulse Readings from Last 1 Encounters:   07/27/22 78          Wt Readings from Last 3 Encounters:   08/11/22 (!) 404 lb 6.4 oz (183.4 kg)   07/27/22 (!) 408 lb (185.1 kg)   07/14/22 (!) 402 lb (182.3 kg)        HPI: 28 y.o. female with a long-standing history of obesity presents today for virtual video follow-up. she has gained 2 pounds since her last visit. Current treatment includes Qsymia 15-92 mg daily. She recently met with Dr. Lonnei Lamar and the surgical team to discuss transitioning back to surgical management. Meal plan changed to include higher carbs. Weight started going back up since adding more carbs to diet. Working towards lowering BMI <65 in order to proceed with bariatric surgery. Continues to be motivated. Medication(s): Appetite well controlled? [x]Yes      []No                          Focus:     []Good     [x]Fair     []Poor                          Side effects? No        Any recent change in medication(s)? No     Exercise: []Cardio     []Resistance/strength training     [x]Other: Walking     No Known Allergies      Current Outpatient Medications:     Phentermine-Topiramate 15-92 MG CP24, Take 1 capsule by mouth daily for 30 days. , Disp: 30 capsule, Rfl: 0    TRESIBA FLEXTOUCH 200 UNIT/ML SOPN, INJECT 140 UNITS UNDER THE SKIN DAILY, Disp: 24 mL, Rfl: 3    JARDIANCE 10 MG tablet, TAKE ONE TABLET BY MOUTH DAILY, Disp: 90 tablet, Rfl: 1    albuterol sulfate HFA (PROVENTIL;VENTOLIN;PROAIR) 108 (90 Base) MCG/ACT inhaler, Inhale 2 puffs into the lungs every 6 hours as needed for Wheezing, Disp: 1 each, Rfl: 1    Continuous Blood Gluc Transmit (DEXCOM G6 TRANSMITTER) MISC, For continuous glucose monitoring .   Diagnosis code E10.9, Disp: 1 each, Rfl: 3    busPIRone (BUSPAR) 7.5 MG tablet, Take 1 tablet by mouth 2 times daily, Disp: 60 tablet, Rfl: 5    escitalopram (LEXAPRO) 20 MG tablet, TAKE ONE TABLET BY MOUTH DAILY, Disp: 30 tablet, Rfl: 5    TRULICITY 3 /9.3LJ SOPN, INJECT 3 MG UNDER THE SKIN ONCE WEEKLY, Disp: 6 mL, Rfl: 3    Continuous Blood Gluc Sensor (FREESTYLE SHARDA 2 SENSOR) MISC, Every 2 week as needed, Disp: 2 each, Rfl: 3    Norgestim-Eth Estrad Triphasic 0.18/0.215/0.25 MG-35 MCG TABS, Take 1 tablet by mouth daily, Disp: 84 tablet, Rfl: 3    vitamin D (ERGOCALCIFEROL) 1.25 MG (52043 UT) CAPS capsule, Take 1 capsule by mouth once a week, Disp: 8 capsule, Rfl: 0    lisinopril (PRINIVIL;ZESTRIL) 10 MG tablet, Take 1 tablet by mouth daily, Disp: 30 tablet, Rfl: 5    insulin lispro, 1 Unit Dial, (HUMALOG KWIKPEN) 100 UNIT/ML SOPN, INJECT 20-24  UNITS SUBCUTANEOUSLY BEFORE MEALS 3 TIMES DAILY, Disp: 10 pen, Rfl: 5    montelukast (SINGULAIR) 10 MG tablet, Take 1 tablet by mouth nightly, Disp: 30 tablet, Rfl: 5    Continuous Blood Gluc  (FREESTYLE SHARDA 2 READER) JERICA, As needed, Disp: 1 each, Rfl: 0    ondansetron (ZOFRAN) 8 MG tablet, Take 1 tablet by mouth every 8 hours as needed for Nausea, Disp: 24 tablet, Rfl: 1    cyclobenzaprine (FLEXERIL) 10 MG tablet, Take 1 po q 8 hours prn low back pain/ tension headaches., Disp: 45 tablet, Rfl: 2    Insulin Pen Needle (B-D UF III MINI PEN NEEDLES) 31G X 5 MM MISC, 4 times a day, Disp: 150 each, Rfl: 6    Patient Active Problem List   Diagnosis    Asthma    Polycystic ovarian disease    Obstructive sleep apnea    Pure hypercholesterolemia    Morbid obesity (HCC)    Allergic rhinitis    Acne    Diabetes mellitus (HCC)    PCOS (polycystic ovarian syndrome)    TAY (obstructive sleep apnea)    Obesity    Folic acid deficiency    Headache    Skin lesion    Major depressive disorder, single episode, mild (HCC)    Mass of subcutaneous tissue of back    Vitamin D deficiency    Depression with anxiety    Controlled type 2 diabetes mellitus with complication, with long-term current use of insulin (HCC)    Morbid obesity with BMI of 70 and over, adult ([facility]       Review of Systems   Constitutional:  Negative for fatigue. Eyes:  Negative for photophobia, pain and visual disturbance. Respiratory:  Negative for apnea, cough, choking, chest tightness, shortness of breath and wheezing. Cardiovascular:  Negative for chest pain, palpitations and leg swelling. Gastrointestinal:  Negative for abdominal distention, abdominal pain, blood in stool, constipation, diarrhea, nausea and vomiting. Endocrine: Negative for cold intolerance and heat intolerance. Musculoskeletal:  Negative for arthralgias and myalgias. Skin:  Negative for rash. Neurological:  Negative for dizziness, tremors, syncope, weakness, numbness and headaches. Psychiatric/Behavioral:  Negative for agitation, confusion, decreased concentration, dysphoric mood, hallucinations, sleep disturbance and suicidal ideas. The patient is not nervous/anxious and is not hyperactive. Physical Exam  Constitutional:       Appearance: She is well-developed. HENT:      Head: Normocephalic. Eyes:      Conjunctiva/sclera: Conjunctivae normal.   Abdominal:      General: Abdomen is protuberant. Musculoskeletal:         General: No swelling. Neurological:      Mental Status: She is alert and oriented to person, place, and time. Psychiatric:         Mood and Affect: Mood normal.         Behavior: Behavior normal.         Thought Content:  Thought content normal. Judgment: Judgment normal.       Abstract on 07/12/2022   Component Date Value Ref Range Status    Diabetic Retinopathy 07/11/2022 Negative   Final         Assessment and Plan:  1. Morbid obesity with BMI of 70 and over, adult ([address]  Gaining weight. Switch back to low carb meal plan as previously prescribed. Continue Qsymia. Increase exercise. Reviewed weight loss goals. F/u 4 weeks. In-office weight check before next visit. - Phentermine-Topiramate 15-92 MG CP24; Take 1 capsule by mouth daily for 30 days. Dispense: 30 capsule; Refill: 0    2. Dietary counseling and surveillance  General low carb/goyo guidelines reviewed. Nutrition Plan: [] LCHF/Ketogenic   [x] Modified low-calorie diet (low carb/low-goyo)               [] Low-calorie diet    [] Maintenance       []Other        Exercise: [x] Cardio     [] Resistance/strength training                       [x] ACSM recommendations (150 minutes/week in active weight loss)               Behavior: [x] Motivational interviewing performed    [] Referral for counseling                         [x] Discussed strategies to overcome habits/challenges for focus         [] Stress management   [x] Stimulus control         [] Sleep hygiene      Reviewed:  [x] Nutrition and the importance of regular protein intake  [x] Hidden carbohydrate sources  [x] Alcohol use  [x] Tobacco use   [x] Drug use- Denies  [x] Importance of exercise and reducingsedentary time  [x] Treatment consent- Patient understands and agrees with the treatment plan   [x] Proper use of medication and side effects  [x] OARRS report      Controlled Substance Monitoring:    RX Monitoring 11/15/2017   Attestation The Prescription Monitoring Report for this patient was reviewed today. Periodic Controlled Substance Monitoring Possible medication side effects, risk of tolerance and/or dependence, and alternative treatments discussed. ;No signs of potential drug abuse or diversion identified. Patient denies any history of cardiovascular disease (e.g., CAD, stroke, arrhythmias, CHF, uncontrolled HTN), seizure disorder, MAOI use within the last 2 weeks, hyperthyroidism, glaucoma, agitated states, history of drug abuse, pregnancy, nursing, known hypersensitivity to the prescribing meds, history of pancreatitis, or personal or family history of thyroid medullary cancer. New treatment start date: 6/6/22  12 weeks: 9/6/22  Starting weight: 427 pounds   Goal: At least 21 pounds   Total weight loss: 23 pounds     Key dietary points:    - Meats (preferably organic or grass fed) are great sources of protein and have no carbohydrates. - Recommend coconut, olive, avocado, or almond oils. - When buying dairy, choose regular or full fat options. - Choose vegetables that grow above ground as they are generally lower in carbohydrates and higher in fiber.  - Avoid starches such as bread, rice, potatoes, pasta and all sources of simple sugars (desserts, soda, breakfast cereals). - Choose beverages that are calorie and sugar free. Reminder regarding weight loss medications: You must be seen in office every 2-4 weeks to haveyour prescriptions refilled. If you are off of your medication for longer than 7 days, you will not be able to restart the medication for at least 6 months. Always call our office to report any side effects. Females, it is your responsibility to obtain negative pregnancy tests each month. No orders of the defined types were placed in this encounter. No follow-ups on file. Heyward Goodell is a 28 y.o. female being evaluated by a Virtual Visit (video visit) encounter to address concerns as mentioned above. A caregiver was present when appropriate. Due to this being a TeleHealth encounter (During NIDWG-50 public health emergency), evaluation of the following organ systems was limited: Vitals/Constitutional/EENT/Resp/CV/GI//MS/Neuro/Skin/Heme-Lymph-Imm.   Pursuant to the emergency declaration under the [facility], [address] [place] [address] authority and the [employer] and [employer] Act, this Virtual Visit was conducted with patient's (and/or legal guardian's) consent, to reduce the patient's risk of exposure to COVID-19 and provide necessary medical care. The patient (and/or legal guardian) has also been advised to contact this office for worsening conditions or problems, and seek emergency medical treatment and/or call 911 if deemed necessary.

## 2022-08-15 ENCOUNTER — TELEPHONE (OUTPATIENT)
Dept: ADMINISTRATIVE | Age: 32
End: 2022-08-15

## 2022-08-15 NOTE — TELEPHONE ENCOUNTER
Received a PA for [employer]. Will need a Rx with Dx code to complete. If this requires a response please respond to the pool ( P [facility] [address] [facility]).

## 2022-08-17 ENCOUNTER — TELEPHONE (OUTPATIENT)
Dept: PULMONOLOGY | Age: 32
End: 2022-08-17

## 2022-08-17 RX ORDER — BLOOD-GLUCOSE TRANSMITTER
EACH MISCELLANEOUS
Qty: 1 EACH | Refills: 3 | Status: SHIPPED | OUTPATIENT
Start: 2022-08-17

## 2022-08-17 NOTE — TELEPHONE ENCOUNTER
Submitted PA for [employer]. Key: [id number] Via CMM STATUS: APPROVED 08/17/2022; Coverage End Date:08/17/2023. Will scan letter when received. Only received PA for transmitter. If this requires a response please respond to the pool ( P [facility] [address]). Thank you please advise patient.

## 2022-08-17 NOTE — TELEPHONE ENCOUNTER
Rx in system with diagnosis code  Did all parts of the CGM system get PA or just the transmitters?     thanks

## 2022-08-19 ENCOUNTER — TELEPHONE (OUTPATIENT)
Dept: PULMONOLOGY | Age: 32
End: 2022-08-19

## 2022-08-19 RX ORDER — EMPAGLIFLOZIN 10 MG/1
TABLET, FILM COATED ORAL
Qty: 90 TABLET | Refills: 1 | Status: SHIPPED | OUTPATIENT
Start: 2022-08-19

## 2022-08-19 RX ORDER — ALBUTEROL SULFATE 90 UG/1
2 AEROSOL, METERED RESPIRATORY (INHALATION) EVERY 6 HOURS PRN
Qty: 1 EACH | Refills: 1 | Status: SHIPPED | OUTPATIENT
Start: 2022-08-19

## 2022-08-19 RX ORDER — INSULIN DEGLUDEC 200 U/ML
INJECTION, SOLUTION SUBCUTANEOUS
Qty: 24 ML | Refills: 3 | Status: SHIPPED | OUTPATIENT
Start: 2022-08-19

## 2022-08-19 NOTE — TELEPHONE ENCOUNTER
Requested Prescriptions     Pending Prescriptions Disp Refills    albuterol sulfate HFA (PROVENTIL;VENTOLIN;PROAIR) 108 (90 Base) MCG/ACT inhaler 1 each 1     Sig: Inhale 2 puffs into the lungs every 6 hours as needed for Wheezing     Last ov 7/14/22  Last lab 11/30/21  Next ov 10/14/22

## 2022-08-19 NOTE — TELEPHONE ENCOUNTER
Medication:   Requested Prescriptions     Pending Prescriptions Disp Refills    [facility] [address] 200 UNIT/ML SOPN [Pharmacy Med Name: Norberto Kelly 200 UNIT/ML] 24 mL 3     Sig: INJECT [facility] 10 MG tablet [Pharmacy Med Name: Sussy Bolivar 10 MG TABLET] 90 tablet 1     Sig: TAKE ONE TABLET BY MOUTH DAILY       Last Filled:      Patient Phone Number: [phone] (home)     Last appt: 3/3/2022   Next appt: 10/22/2022  Last Labs DM:   Lab Results   Component Value Date/Time    LABA1C 8.0 03/03/2022 05:46 PM

## 2022-08-22 ENCOUNTER — TELEPHONE (OUTPATIENT)
Dept: ADMINISTRATIVE | Age: 32
End: 2022-08-22

## 2022-08-22 NOTE — TELEPHONE ENCOUNTER
Submitted PA for Albuterol Sulfate HFA  Via CMM  Key: [id number] STATUS: \"PA not Required. \"    If this requires a response please respond to the pool. [address]). Please advise patient thank you.

## 2022-08-22 NOTE — PROGRESS NOTES
[facility]) Physicians   General & Laparoscopic Surgery  [employer] [facility] [employer]       HPI:     Coral Mejía is a very pleasant 28 y.o. female with Body mass index is 73.43 kg/m². , Pre-Surgery. Pre-operative clearance and work up pending. Working hard to keep good dietary habits as well level of activity. Patient denies any nausea, vomiting, fevers, chills, shortness of breath, chest pain, cough, constipation or difficulty urinating. Losing weight with Dr. Sergio Monte 23 #      Past Medical History:   Diagnosis Date    Acne     Allergic rhinitis     Anxiety     Asthma     Cellulitis     on back/ on bactrim for/ pcp aware    COVID-19 12/2020    Depression     Diabetes mellitus type 1 ([facility] 7/03    Folic acid deficiency     Hyperlipidemia     Morbid obesity with BMI of 70 and over, adult ([facility]     Obesity     Obstructive sleep apnea 1/5/2011    TAY (obstructive sleep apnea) 7/07    PCOS (polycystic ovarian syndrome)     Pre-operative clearance     Pyelonephritis 12/08    left    Vitamin D deficiency      Past Surgical History:   Procedure Laterality Date    CHOLECYSTECTOMY  1/15/2013    MULTIPORT ROBOTIC ASSISTED LAPAROSCOPIC CHOLECYSTECTOMY    CYST REMOVAL      OTHER SURGICAL HISTORY  2009    Cyst/Mass excised from posterior neck    OTHER SURGICAL HISTORY  12/15    excision back lesion-Dr. Anna Guin    WISDOM TOOTH EXTRACTION       Family History   Problem Relation Age of Onset    Diabetes Mother     Diabetes Father     High Blood Pressure Father     Asthma Father     Hypertension Father     Elevated Lipids Father     Prostate Cancer Father     Sleep Apnea Father      Social History     Tobacco Use    Smoking status: Never    Smokeless tobacco: Never   Substance Use Topics    Alcohol use: Yes     Alcohol/week: 0.0 standard drinks     Types: 1 Standard drinks or equivalent per week     Comment: social     I counseled the patient on the importance of not smoking and risks of ETOH.    No Known Allergies  Vitals: 07/27/22 0823   BP: 125/81   Pulse: 78   Weight: (!) 408 lb (185.1 kg)   Height: 5' 2.5\" (1.588 m)       Body mass index is 73.43 kg/m².       Current Outpatient Medications:     busPIRone (BUSPAR) 7.5 MG tablet, Take 1 tablet by mouth 2 times daily, Disp: 60 tablet, Rfl: 5    escitalopram (LEXAPRO) 20 MG tablet, TAKE ONE TABLET BY MOUTH DAILY, Disp: 30 tablet, Rfl: 5    TRULICITY 3 VQ/5.8AT SOPN, INJECT 3 MG UNDER THE SKIN ONCE WEEKLY, Disp: 6 mL, Rfl: 3    Continuous Blood Gluc Sensor (FREESTYLE SHARDA 2 SENSOR) MISC, Every 2 week as needed, Disp: 2 each, Rfl: 3    Norgestim-Eth Estrad Triphasic 0.18/0.215/0.25 MG-35 MCG TABS, Take 1 tablet by mouth daily, Disp: 84 tablet, Rfl: 3    vitamin D (ERGOCALCIFEROL) 1.25 MG (02007 UT) CAPS capsule, Take 1 capsule by mouth once a week, Disp: 8 capsule, Rfl: 0    lisinopril (PRINIVIL;ZESTRIL) 10 MG tablet, Take 1 tablet by mouth daily, Disp: 30 tablet, Rfl: 5    insulin lispro, 1 Unit Dial, (HUMALOG KWIKPEN) 100 UNIT/ML SOPN, INJECT 20-24  UNITS SUBCUTANEOUSLY BEFORE MEALS 3 TIMES DAILY, Disp: 10 pen, Rfl: 5    montelukast (SINGULAIR) 10 MG tablet, Take 1 tablet by mouth nightly, Disp: 30 tablet, Rfl: 5    Continuous Blood Gluc  (FREESTYLE SHARDA 2 READER) JERICA, As needed, Disp: 1 each, Rfl: 0    ondansetron (ZOFRAN) 8 MG tablet, Take 1 tablet by mouth every 8 hours as needed for Nausea, Disp: 24 tablet, Rfl: 1    cyclobenzaprine (FLEXERIL) 10 MG tablet, Take 1 po q 8 hours prn low back pain/ tension headaches., Disp: 45 tablet, Rfl: 2    Insulin Pen Needle (B-D UF III MINI PEN NEEDLES) 31G X 5 MM MISC, 4 times a day, Disp: 150 each, Rfl: 6    TRESIBA FLEXTOUCH 200 UNIT/ML SOPN, INJECT 140 UNITS UNDER THE SKIN DAILY, Disp: 24 mL, Rfl: 3    JARDIANCE 10 MG tablet, TAKE ONE TABLET BY MOUTH DAILY, Disp: 90 tablet, Rfl: 1    albuterol sulfate HFA (PROVENTIL;VENTOLIN;PROAIR) 108 (90 Base) MCG/ACT inhaler, Inhale 2 puffs into the lungs every 6 hours as needed for Wheezing, Disp: 1 each, Rfl: 1    Continuous Blood Gluc Transmit (DEXCOM G6 TRANSMITTER) MISC, For continuous glucose monitoring . Diagnosis code E10.9, Disp: 1 each, Rfl: 3    Phentermine-Topiramate 15-92 MG CP24, Take 1 capsule by mouth daily for 30 days. , Disp: 30 capsule, Rfl: 0      Review of Systems - History obtained from the patient  General ROS: negative  Psychological ROS: negative  Endocrine ROS: negative  Respiratory ROS: negative  Cardiovascular ROS: negative  Gastrointestinal ROS:negative  Genito-Urinary ROS: negative  Musculoskeletal ROS: negative   Skin ROS: negative    Physical Exam   Vitals Reviewed   Constitutional: Patient is oriented to person, place, and time. Patient appears well-developed and well-nourished. Patient is active and cooperative. Non-toxic appearance. No distress. Neck: Trachea normal and normal range of motion. No JVD present. Pulmonary/Chest: Effort normal. No accessory muscle usage or stridor. No apnea. No respiratory distress. Cardiovascular: Normal rate and no JVD. Abdominal: Normal appearance. Patient exhibits no distension. Abdomen is soft, obese, non tender. Musculoskeletal: Normal range of motion. Patient exhibits no edema. Neurological: Patient is alert and oriented to person, place, and time. Patient has normal strength. GCS eye subscore is 4. GCS verbal subscore is 5. GCS motor subscore is 6. Skin: Skin is warm and dry. No abrasion and no rash noted. Patient is not diaphoretic. No cyanosis or erythema. Psychiatric: Patient has a normal mood and affect. Speech is normal and behavior is normal. Cognition and memory are normal.       A/P    Sherryle Hobby is 28 y.o. female, Body mass index is 73.43 kg/m². pre surgery, has lost weight since last visit. The patient underwent dietary counseling with registered dietician. I have reviewed, discussed and agree with the dietary plan. Patient is trying hard to keep good dietary and behavior modifications.  Patient is monitoring portion sizes, food choices and liquid calories. Patient is trying to exercise regularly as much as possible. We discussed how her weight affects her overall health including:  Sara Shaw was seen today for obesity. Diagnoses and all orders for this visit:    Morbid obesity with BMI of 70 and over, adult ([facility]  -     CBC with Auto Differential; Future  -     Comprehensive Metabolic Panel; Future  -     Hemoglobin A1C; Future  -     Iron and TIBC; Future  -     Lipid Panel; Future  -     TSH with Reflex; Future  -     Vitamin B12 & Folate; Future  -     Vitamin D 25 Hydroxy; Future  -     Jennifer Saldana MD, Cardiology, [facility]    TAY (obstructive sleep apnea)  -     CBC with Auto Differential; Future  -     Comprehensive Metabolic Panel; Future  -     Hemoglobin A1C; Future  -     Iron and TIBC; Future  -     Lipid Panel; Future  -     TSH with Reflex; Future  -     Vitamin B12 & Folate; Future  -     Vitamin D 25 Hydroxy; Future  -     Jennifer Saldana MD, Cardiology, [facility]    Type 2 diabetes mellitus without complication, with long-term current use of insulin ([facility]       and importance of weight loss to alleviate those co morbid conditions. I encouraged the patient to continue exercise and keeping healthy eating habits. Discussed pre-op labs and work up till now. Also counseled the patient extensively on Surgery. RTC in 4 weeks  Obtain rest of pre-op work up / clearances  Diet and Exercise   Continue non-surgical weight loss program and f/u in 6 months     Patient advised that its their responsibility to follow up for studies and/or labs ordered today.      Chasity Palomino

## 2022-09-08 ENCOUNTER — TELEMEDICINE (OUTPATIENT)
Dept: BARIATRICS/WEIGHT MGMT | Age: 32
End: 2022-09-08
Payer: COMMERCIAL

## 2022-09-08 VITALS — BODY MASS INDEX: 72.39 KG/M2 | WEIGHT: 293 LBS

## 2022-09-08 DIAGNOSIS — Z71.3 DIETARY COUNSELING AND SURVEILLANCE: ICD-10-CM

## 2022-09-08 DIAGNOSIS — E66.01 MORBID OBESITY WITH BMI OF 70 AND OVER, ADULT (HCC): Primary | ICD-10-CM

## 2022-09-08 PROCEDURE — 99214 OFFICE O/P EST MOD 30 MIN: CPT | Performed by: FAMILY MEDICINE

## 2022-09-08 ASSESSMENT — ENCOUNTER SYMPTOMS
CHOKING: 0
DIARRHEA: 0
WHEEZING: 0
CHEST TIGHTNESS: 0
APNEA: 0
ABDOMINAL PAIN: 0
NAUSEA: 0
BLOOD IN STOOL: 0
CONSTIPATION: 0
VOMITING: 0
COUGH: 0
PHOTOPHOBIA: 0
ABDOMINAL DISTENTION: 0
EYE PAIN: 0
SHORTNESS OF BREATH: 0

## 2022-09-08 NOTE — PROGRESS NOTES
glucose monitoring . Diagnosis code E10.9, Disp: 1 each, Rfl: 3    Phentermine-Topiramate 15-92 MG CP24, Take 1 capsule by mouth daily for 30 days. , Disp: 30 capsule, Rfl: 0    busPIRone (BUSPAR) 7.5 MG tablet, Take 1 tablet by mouth 2 times daily, Disp: 60 tablet, Rfl: 5    escitalopram (LEXAPRO) 20 MG tablet, TAKE ONE TABLET BY MOUTH DAILY, Disp: 30 tablet, Rfl: 5    TRULICITY 3 KU/2.4WF SOPN, INJECT 3 MG UNDER THE SKIN ONCE WEEKLY, Disp: 6 mL, Rfl: 3    Continuous Blood Gluc Sensor (FREESTYLE SHARDA 2 SENSOR) MISC, Every 2 week as needed, Disp: 2 each, Rfl: 3    Norgestim-Eth Estrad Triphasic 0.18/0.215/0.25 MG-35 MCG TABS, Take 1 tablet by mouth daily, Disp: 84 tablet, Rfl: 3    vitamin D (ERGOCALCIFEROL) 1.25 MG (54968 UT) CAPS capsule, Take 1 capsule by mouth once a week, Disp: 8 capsule, Rfl: 0    lisinopril (PRINIVIL;ZESTRIL) 10 MG tablet, Take 1 tablet by mouth daily, Disp: 30 tablet, Rfl: 5    insulin lispro, 1 Unit Dial, (HUMALOG KWIKPEN) 100 UNIT/ML SOPN, INJECT 20-24  UNITS SUBCUTANEOUSLY BEFORE MEALS 3 TIMES DAILY, Disp: 10 pen, Rfl: 5    montelukast (SINGULAIR) 10 MG tablet, Take 1 tablet by mouth nightly, Disp: 30 tablet, Rfl: 5    Continuous Blood Gluc  (FREESTYLE SHARDA 2 READER) JERICA, As needed, Disp: 1 each, Rfl: 0    ondansetron (ZOFRAN) 8 MG tablet, Take 1 tablet by mouth every 8 hours as needed for Nausea, Disp: 24 tablet, Rfl: 1    cyclobenzaprine (FLEXERIL) 10 MG tablet, Take 1 po q 8 hours prn low back pain/ tension headaches., Disp: 45 tablet, Rfl: 2    Insulin Pen Needle (B-D UF III MINI PEN NEEDLES) 31G X 5 MM MISC, 4 times a day, Disp: 150 each, Rfl: 6    Patient Active Problem List   Diagnosis    Asthma    Polycystic ovarian disease    Obstructive sleep apnea    Pure hypercholesterolemia    Morbid obesity (HCC)    Allergic rhinitis    Acne    Diabetes mellitus (HCC)    PCOS (polycystic ovarian syndrome)    TAY (obstructive sleep apnea)    Obesity    Folic acid deficiency Headache    Skin lesion    Major depressive disorder, single episode, mild (HCC)    Mass of subcutaneous tissue of back    Vitamin D deficiency    Depression with anxiety    Controlled type 2 diabetes mellitus with complication, with long-term current use of insulin ([address]    Morbid obesity with BMI of 70 and over, adult [facility])       Review of Systems   Constitutional:  Negative for fatigue. Eyes:  Negative for photophobia, pain and visual disturbance. Respiratory:  Negative for apnea, cough, choking, chest tightness, shortness of breath and wheezing. Cardiovascular:  Negative for chest pain, palpitations and leg swelling. Gastrointestinal:  Negative for abdominal distention, abdominal pain, blood in stool, constipation, diarrhea, nausea and vomiting. Endocrine: Negative for cold intolerance and heat intolerance. Musculoskeletal:  Negative for arthralgias and myalgias. Skin:  Negative for rash. Neurological:  Negative for dizziness, tremors, syncope, weakness, numbness and headaches. Psychiatric/Behavioral:  Negative for agitation, confusion, decreased concentration, dysphoric mood, hallucinations, sleep disturbance and suicidal ideas. The patient is not nervous/anxious and is not hyperactive. Physical Exam  Constitutional:       Appearance: She is well-developed. HENT:      Head: Normocephalic. Eyes:      Conjunctiva/sclera: Conjunctivae normal.   Abdominal:      General: Abdomen is protuberant. Musculoskeletal:         General: No swelling. Neurological:      Mental Status: She is alert and oriented to person, place, and time. Psychiatric:         Mood and Affect: Mood normal.         Behavior: Behavior normal.         Thought Content: Thought content normal.         Judgment: Judgment normal.       Abstract on 07/12/2022   Component Date Value Ref Range Status    Diabetic Retinopathy 07/11/2022 Negative   Final         Assessment and Plan:  1.  Morbid obesity with BMI of 70 and over, adult [facility])  Improving, not at goal.  Continue current management. Qsymia refilled. F/u 4 weeks. - Phentermine-Topiramate 15-92 MG CP24; Take 1 capsule by mouth daily for 30 days. Dispense: 30 capsule; Refill: 0    2. Dietary counseling and surveillance  Continue nutrition plan as discussed. Increase exercise as as discussed. Avoid skipping meals. Avoid evening/nighttime snacking. Use distraction techniques to avoid boredom/stress eating. Plan/prep meals ahead of time. Avoid soda/juice/sugary drinks. Be mindful of portion sizes. Nutrition Plan: [] LCHF/Ketogenic   [x] Modified low-calorie diet (low carb/low-goyo)               [] Low-calorie diet    [] Maintenance       []Other        Exercise: [x] Cardio     [x] Resistance/strength training                       [x] ACSM recommendations (150 minutes/week in active weight loss)               Behavior: [x] Motivational interviewing performed    [] Referral for counseling                         [x] Discussed strategies to overcome habits/challenges for focus         [] Stress management   [x] Stimulus control         [] Sleep hygiene      Reviewed:  [x] Nutrition and the importance of regular protein intake  [x] Hidden carbohydrate sources  [x] Alcohol use  [x] Tobacco use   [x] Drug use- Denies  [x] Importance of exercise and reducing sedentary time  [x] Treatment consent- Patient understands and agrees with the treatment plan   [x] Proper use of medication and side effects  [x] OARRS report      Controlled Substance Monitoring:    RX Monitoring 11/15/2017   Attestation The Prescription Monitoring Report for this patient was reviewed today. Periodic Controlled Substance Monitoring Possible medication side effects, risk of tolerance and/or dependence, and alternative treatments discussed. ;No signs of potential drug abuse or diversion identified.           Patient denies any history of cardiovascular disease (e.g., CAD, stroke, arrhythmias, CHF, uncontrolled HTN), seizure disorder, MAOI use within the last 2 weeks, hyperthyroidism, glaucoma, agitated states, history of drug abuse, pregnancy, nursing, known hypersensitivity to the prescribing meds, history of pancreatitis, or personal or family history of thyroid medullary cancer. New start date: 9/9/22  12 weeks: 12/9/22  Starting weight: 402 pounds    Goal: At least 5% (20 pounds)     New treatment start date: 6/6/22  12 weeks: 9/6/22  Starting weight: 427 pounds   Goal: At least 21 pounds   Total weight loss: 25 pounds         Key dietary points:    - Meats (preferably organic or grass fed) are great sources of protein and have no carbohydrates. - Recommend coconut, olive, avocado, or almond oils. - When buying dairy, choose regular or full fat options. - Choose vegetables that grow above ground as they are generally lower in carbohydrates and higher in fiber.  - Avoid starches such as bread, rice, potatoes, pasta and all sources of simple sugars (desserts, soda, breakfast cereals). - Choose beverages that are calorie and sugar free. Reminder regarding weight loss medications: You must be seen in office every 2-4 weeks to haveyour prescriptions refilled. If you are off of your medication for longer than 7 days, you will not be able to restart the medication for at least 6 months. Always call our office to report any side effects. Females, it is your responsibility to obtain negative pregnancy tests each month. No orders of the defined types were placed in this encounter. No follow-ups on file. Kieran Gleason is a 28 y.o. female being evaluated by a Virtual Visit (video visit) encounter to address concerns as mentioned above. A caregiver was present when appropriate.  Due to this being a TeleHealth encounter (During NNRZQ-72 public health emergency), evaluation of the following organ systems was limited: Vitals/Constitutional/EENT/Resp/CV/GI//MS/Neuro/Skin/Heme-Lymph-Imm. Pursuant to the emergency declaration under the [address] [place] [address], [address] [place] [address] authority and the [employer] Act, this Virtual Visit was conducted with patient's (and/or legal guardian's) consent, to reduce the patient's risk of exposure to COVID-19 and provide necessary medical care. The patient (and/or legal guardian) has also been advised to contact this office for worsening conditions or problems, and seek emergency medical treatment and/or call 911 if deemed necessary.

## 2022-09-13 DIAGNOSIS — E66.01 MORBID OBESITY WITH BMI OF 70 AND OVER, ADULT (HCC): ICD-10-CM

## 2022-09-13 DIAGNOSIS — Z79.4 TYPE 2 DIABETES MELLITUS WITHOUT COMPLICATION, WITH LONG-TERM CURRENT USE OF INSULIN (HCC): ICD-10-CM

## 2022-09-13 DIAGNOSIS — E11.9 TYPE 2 DIABETES MELLITUS WITHOUT COMPLICATION, WITH LONG-TERM CURRENT USE OF INSULIN (HCC): ICD-10-CM

## 2022-09-13 DIAGNOSIS — G47.33 OSA (OBSTRUCTIVE SLEEP APNEA): ICD-10-CM

## 2022-09-13 LAB
A/G RATIO: 1.7 (ref 1.1–2.2)
ALBUMIN SERPL-MCNC: 4.3 G/DL (ref 3.4–5)
ALP BLD-CCNC: 90 U/L (ref 40–129)
ALT SERPL-CCNC: 17 U/L (ref 10–40)
ANION GAP SERPL CALCULATED.3IONS-SCNC: 14 MMOL/L (ref 3–16)
AST SERPL-CCNC: 13 U/L (ref 15–37)
BASOPHILS ABSOLUTE: 0 K/UL (ref 0–0.2)
BASOPHILS RELATIVE PERCENT: 0.4 %
BILIRUB SERPL-MCNC: 0.3 MG/DL (ref 0–1)
BUN BLDV-MCNC: 14 MG/DL (ref 7–20)
CALCIUM SERPL-MCNC: 9 MG/DL (ref 8.3–10.6)
CHLORIDE BLD-SCNC: 103 MMOL/L (ref 99–110)
CHOLESTEROL, TOTAL: 187 MG/DL (ref 0–199)
CO2: 25 MMOL/L (ref 21–32)
CREAT SERPL-MCNC: <0.5 MG/DL (ref 0.6–1.1)
EOSINOPHILS ABSOLUTE: 0.1 K/UL (ref 0–0.6)
EOSINOPHILS RELATIVE PERCENT: 0.8 %
FOLATE: 3.67 NG/ML (ref 4.78–24.2)
GFR AFRICAN AMERICAN: >60
GFR NON-AFRICAN AMERICAN: >60
GLUCOSE BLD-MCNC: 77 MG/DL (ref 70–99)
HCT VFR BLD CALC: 40 % (ref 36–48)
HDLC SERPL-MCNC: 64 MG/DL (ref 40–60)
HEMOGLOBIN: 13.2 G/DL (ref 12–16)
IRON SATURATION: 13 % (ref 15–50)
IRON: 46 UG/DL (ref 37–145)
LDL CHOLESTEROL CALCULATED: 108 MG/DL
LYMPHOCYTES ABSOLUTE: 2.5 K/UL (ref 1–5.1)
LYMPHOCYTES RELATIVE PERCENT: 22.8 %
MCH RBC QN AUTO: 26.8 PG (ref 26–34)
MCHC RBC AUTO-ENTMCNC: 33 G/DL (ref 31–36)
MCV RBC AUTO: 81.1 FL (ref 80–100)
MONOCYTES ABSOLUTE: 0.5 K/UL (ref 0–1.3)
MONOCYTES RELATIVE PERCENT: 4.6 %
NEUTROPHILS ABSOLUTE: 7.8 K/UL (ref 1.7–7.7)
NEUTROPHILS RELATIVE PERCENT: 71.4 %
PDW BLD-RTO: 16.4 % (ref 12.4–15.4)
PLATELET # BLD: 365 K/UL (ref 135–450)
PMV BLD AUTO: 7.2 FL (ref 5–10.5)
POTASSIUM SERPL-SCNC: 4.3 MMOL/L (ref 3.5–5.1)
RBC # BLD: 4.93 M/UL (ref 4–5.2)
SODIUM BLD-SCNC: 142 MMOL/L (ref 136–145)
TOTAL IRON BINDING CAPACITY: 364 UG/DL (ref 260–445)
TOTAL PROTEIN: 6.9 G/DL (ref 6.4–8.2)
TRIGL SERPL-MCNC: 77 MG/DL (ref 0–150)
TSH REFLEX: 2.78 UIU/ML (ref 0.27–4.2)
VITAMIN B-12: 557 PG/ML (ref 211–911)
VITAMIN D 25-HYDROXY: 21.7 NG/ML
VLDLC SERPL CALC-MCNC: 15 MG/DL
WBC # BLD: 11 K/UL (ref 4–11)

## 2022-09-13 NOTE — PROGRESS NOTES
Heyward Goodell reports CW as 402-403 lb, down ~5-6 lb since last visit. Pt continues to work with Dr. Franklyn De La Rosa, currently on qsymia, sometimes its a little difficult to remember to eat. This eval was conducted via phone on 9/13/22 in preparation for their visit on 9/14/22 with Dr. Wenceslao Key. Is pt eating at least 4 times everyday? yes    B- oatmeal (low sugar) made w/ water sometimes w/ a banana  S- apple w/ or w/o PB  L- salad w/ sliced deli ham/turkey OR chicken salad  S- sometimes wheat thins (snack size)   D- protein (beef or pork chop or chicken) w/ corn or salad  S- none    Is pt eating a lean protein source with all meals and snacks? not always    Has pt decreased their portions using the plate method? using a small paper bowl / trying to be mindful    Is pt choosing low fat/sugar free options? yes- really trying, struggles when she is stressed / will do occasional pc of candy    Is pt drinking at least 64 oz of clear liquids everyday? yes - water / decaf coffee w/ sf vanilla    Has pt stopped drinking carbonation, caffeinated, and sugar sweetened beverages? yes    Has pt sampled Unjury and/or Nectar protein? tried & tolerated    Has pt attended a support group?  Not scheduled , still working w/ Dr. Franklyn De La Rosa    Participating in intentional exercise? yes - 3x/wk going to [facility], plus lo 1x/wk, pt is also on her feet a lot a work - pt is challenging self    Plan/Recommendations:   - Focus on lean protein 4x/day  - Complete Support Group    Handouts: none    Alejandro Pinto, YASMEEN, LD

## 2022-09-13 NOTE — PROGRESS NOTES
Henry Drake reports CW as 402-403 lb, down ~5-6 lb since last visit. Pt continues to work with Dr. Leah Valente, currently on qsymia, sometimes its a little difficult to remember to eat. This eval was conducted via phone on 9/13/22 in preparation for their visit on 9/14/22 with Dr. Amadou Chandler. Is pt eating at least 4 times everyday? yes    B- oatmeal (low sugar) made w/ water sometimes w/ a banana  S- apple w/ or w/o PB  L- salad w/ sliced deli ham/turkey OR chicken salad  S- sometimes wheat thins (snack size)   D- protein (beef or pork chop or chicken) w/ corn or salad  S- none    Is pt eating a lean protein source with all meals and snacks?  not always    Has pt decreased their portions using the plate method?  using a small paper bowl / trying to be mindful    Is pt choosing low fat/sugar free options? yes- really trying, struggles when she is stressed / will do occasional pc of candy    Is pt drinking at least 64 oz of clear liquids everyday? yes - water  / decaf coffee w/ sf vanilla    Has pt stopped drinking carbonation, caffeinated, and sugar sweetened beverages? yes    Has pt sampled Unjury and/or Nectar protein?  tried & tolerated    Has pt attended a support group?  Not scheduled , still working w/ Dr. Leah Valente    Participating in intentional exercise? yes - 3x/wk going to [facility], plus lo 1x/wk, pt is also on her feet a lot a work    Plan/Recommendations:   - Focus on lean protein 4x/day  - Complete Support Group    Handouts: none    Car Castaneda, RD, LD

## 2022-09-14 ENCOUNTER — TELEPHONE (OUTPATIENT)
Dept: PULMONOLOGY | Age: 32
End: 2022-09-14

## 2022-09-14 ENCOUNTER — OFFICE VISIT (OUTPATIENT)
Dept: BARIATRICS/WEIGHT MGMT | Age: 32
End: 2022-09-14
Payer: COMMERCIAL

## 2022-09-14 VITALS — BODY MASS INDEX: 51.91 KG/M2 | WEIGHT: 293 LBS | HEIGHT: 63 IN

## 2022-09-14 DIAGNOSIS — E11.9 TYPE 2 DIABETES MELLITUS WITHOUT COMPLICATION, WITH LONG-TERM CURRENT USE OF INSULIN (HCC): ICD-10-CM

## 2022-09-14 DIAGNOSIS — G47.33 OSA (OBSTRUCTIVE SLEEP APNEA): ICD-10-CM

## 2022-09-14 DIAGNOSIS — E66.01 MORBID OBESITY WITH BMI OF 70 AND OVER, ADULT (HCC): Primary | ICD-10-CM

## 2022-09-14 DIAGNOSIS — Z79.4 TYPE 2 DIABETES MELLITUS WITHOUT COMPLICATION, WITH LONG-TERM CURRENT USE OF INSULIN (HCC): ICD-10-CM

## 2022-09-14 LAB
ESTIMATED AVERAGE GLUCOSE: 131.2 MG/DL
HBA1C MFR BLD: 6.2 %

## 2022-09-14 PROCEDURE — 99214 OFFICE O/P EST MOD 30 MIN: CPT | Performed by: SURGERY

## 2022-09-14 PROCEDURE — 3044F HG A1C LEVEL LT 7.0%: CPT | Performed by: SURGERY

## 2022-09-14 NOTE — PROGRESS NOTES
Abundio Stage         : 1990  [address] [facility]    Diagnosis: [x] TAY (G47.33) [] CSA (G47.31) [] Apnea (G47.30)   Length of Need: [x] 18 Months [] 99 Months [] Other:    Machine (SG!): [] Respironics Dream Station   2   Auto [x] [employer] S11 [] Other:     []  CPAP () [x] Bilevel ()   Mode: [] Auto [] Spontaneous    Mode: [x] Auto [] Spontaneous       IPAP max 25 cnH2O  EPAP min [id number]  PS 3 cmH2O     Comfort Settings:   - Ramp Pressure: 5 cmH2O                                        - Ramp time: 15 min                                     -  Flex/EPR - 3 full time                                    - For ResMed Bilevel (TiMax-4 sec   TiMin- 0.2 sec)     Humidifier: [x] Heated ()        [x] Water chamber replacement ()/ 1 per 6 months        Mask:   [] Nasal () /1 per 3 months [x] Full Face () /1 per 3 months   [] Patient choice -Size and fit mask [x] Patient Choice - Size and fit mask   [] Dispense:  [] Dispense:    [] Headgear () / 1 per 3 months [x] Headgear () / 1 per 3 months   [] Replacement Nasal Cushion ()/2 per month [x] Interface Replacement ()/1 per month   [] Replacement Nasal Pillows ()/2 per month         Tubing: [x] Heated ()/1 per 3 months    [] Standard ()/1 per 3 months [] Other:           Filters: [x] Non-disposable ()/1 per 6 months     [x] Ultra-Fine, Disposable ()/2 per month        Miscellaneous: [] Chin Strap ()/ 1 per 6 months [] O2 bleed-in:       LPM   [] Oximetry on CPAP/Bilevel []  Other:    [x] Modem: ()         Start Order Date: 22    MEDICAL JUSTIFICATION:  I, the undersigned, certify that the above prescribed supplies are medically necessary for this patients wellbeing. In my opinion, the supplies are both reasonable and necessary in reference to accepted standards of medicalpractice in treatment of this patients condition.     Abdon Sanchez MD      NPI: [id number]       Order Signed Date: 22    Electronically signed by Merle Cain MD on 2022 at 4:11 PM    Bre Lynn  1990 [facility]  [phone] (home)   [phone] (mobile)      Insurance Info (confirm with patient if correct):  Payer/Plan Subscr  Sex Relation Sub.  Ins. ID Effective Group Num

## 2022-09-14 NOTE — TELEPHONE ENCOUNTER
Spoke with pt to review titration study. Order to be sent to [facility]. Pt will need to schedule a f/u.

## 2022-09-18 RX ORDER — LISINOPRIL 10 MG/1
TABLET ORAL
Qty: 30 TABLET | Refills: 5 | Status: SHIPPED | OUTPATIENT
Start: 2022-09-18

## 2022-09-29 ENCOUNTER — TELEMEDICINE (OUTPATIENT)
Dept: PSYCHOLOGY | Age: 32
End: 2022-09-29
Payer: COMMERCIAL

## 2022-09-29 DIAGNOSIS — F41.9 ANXIETY: Primary | ICD-10-CM

## 2022-09-29 PROCEDURE — 90832 PSYTX W PT 30 MINUTES: CPT | Performed by: PSYCHOLOGIST

## 2022-09-29 NOTE — PROGRESS NOTES
Behavioral Health Consultation  Morales Ryder Psy.D. Psychologist  9/29/2022  2-2:30 PM EDT      Time spent with Patient: 30 minutes  This is patient's fifth [facility] appointment for this episode of care. Reason for Consult: Depression, anxiety  Referring Provider: Ines Mirza MD  [address] [facility] [address] (During LJFEE-14 public health emergency)  }  Lianet Almodovar was evaluated through a synchronous (real-time) audio-video encounter. The patient (or guardian, if applicable) is aware that this is a billable service, which includes applicable co-pays. This Virtual Visit was conducted with patient's (and/or legal guardian's) consent. The visit was conducted pursuant to the emergency declaration under the [address] [place] [address], [address] [place] [address] authority and the [employer] General Act. Patient identification was verified, and a caregiver was present when appropriate. The patient was located in a state where the provider was licensed to provide care. Conducted a risk-benefit analysis and determined that the patient's presenting problems are consistent with the use of telepsychology. Determined that the patient has sufficient knowledge and skills in the use of technology enabling them to adequately benefit from telepsychology. It was determined that this patient was able to be properly treated without an in-person session. Patient verified that they were currently located at the address that was provided during registration. Verified the following information:  Patient's identification: Yes  Patient location: Work @ [place] [address]  Patient's call back number: [phone]  Patient's emergency contact's name and number, as well as permission to contact them if needed:  Extended Emergency Contact Information  Primary Emergency Contact: Lupe Vides  Address: [address] [facility], [place] [address] Phone: [phone]  Mobile Phone: [phone]  Relation: Parent  Secondary Emergency Contact: Clifford Dumont  Address: [place] [address] Phone: [phone]  Relation: Parent    [facility]  Pt has been doing well; feeling stable. Working on perfectionistic behavior. More focused on self-care. Aiming to leave work by 6pm at least once per week. No longer working on the weekends. Better able to assert her needs, to which others and her parts are still adjusting. O:  Interventions:  -Supportive techniques  -Processed experiences / stressors / concerns  -Reinforced efforts towards self-care  -Engaged in parts work with people pleasing part  -Highlighted areas of progress      A:  MSE:  Appearance: good hygiene  and appropriate attire  Attitude: cooperative and friendly  Consciousness: alert  Orientation: oriented to person, place, time, general circumstance  Memory: recent and remote memory intact  Attention/Concentration: intact during session  Psychomotor Activity: normal  Eye Contact: normal  Speech: normal rate and volume, well-articulated  Mood: mildly anxious  Affect: congruent  Perception: within normal limits  Thought Content: within normal limits  Thought Process: logical, coherent and goal-directed  Insight: good  Judgment: intact  Ability to understand instructions: Yes  Ability to respond meaningfully: Yes  Morbid Ideation: no   Suicide Assessment: no suicidal ideation, plan, or intent. Appropriate for outpatient / telehealth care at this time.   Homicidal Ideation: no      PHQ Scores 7/14/2022 7/14/2022 1/24/2022 11/12/2018 9/25/2017 3/24/2016 3/14/2016   PHQ2 Score 3 0 4 2 0 3 4   PHQ9 Score 6 0 19 2 0 9 14     Interpretation of Total Score Depression Severity: 1-4 = Minimal depression, 5-9 = Mild depression, 10-14 = Moderate

## 2022-10-02 NOTE — PROGRESS NOTES
[facility]) Physicians   General & Laparoscopic Surgery  [employer]       HPI:     Susana Valles is a very pleasant 33 y.o. female with Body mass index is 72.9 kg/m². , Pre-Surgery. Pre-operative clearance and work up pending. Working hard to keep good dietary habits as well level of activity. Patient denies any nausea, vomiting, fevers, chills, shortness of breath, chest pain, cough, constipation or difficulty urinating. Past Medical History:   Diagnosis Date    Acne     Allergic rhinitis     Anxiety     Asthma     Cellulitis     on back/ on bactrim for/ pcp aware    COVID-19 12/2020    Depression     Diabetes mellitus type 1 ([facility] 0/25    Folic acid deficiency     Hyperlipidemia     Morbid obesity with BMI of 70 and over, adult ([facility]     Obesity     Obstructive sleep apnea 1/5/2011    TAY (obstructive sleep apnea) 7/07    PCOS (polycystic ovarian syndrome)     Pre-operative clearance     Pyelonephritis 12/08    left    Vitamin D deficiency      Past Surgical History:   Procedure Laterality Date    CHOLECYSTECTOMY  1/15/2013    MULTIPORT ROBOTIC ASSISTED LAPAROSCOPIC CHOLECYSTECTOMY    CYST REMOVAL      OTHER SURGICAL HISTORY  2009    Cyst/Mass excised from posterior neck    OTHER SURGICAL HISTORY  12/15    excision back lesion-Dr. John Burton    WISDOM TOOTH EXTRACTION       Family History   Problem Relation Age of Onset    Diabetes Mother     Diabetes Father     High Blood Pressure Father     Asthma Father     Hypertension Father     Elevated Lipids Father     Prostate Cancer Father     Sleep Apnea Father      Social History     Tobacco Use    Smoking status: Never    Smokeless tobacco: Never   Substance Use Topics    Alcohol use: Yes     Alcohol/week: 0.0 standard drinks     Types: 1 Standard drinks or equivalent per week     Comment: social     I counseled the patient on the importance of not smoking and risks of ETOH.    No Known Allergies  Vitals:    09/14/22 0733   Weight: (!) 405 lb (183.7 kg)   Height: 5' 2.5\" (1.588 m)       Body mass index is 72.9 kg/m². Current Outpatient Medications:     Phentermine-Topiramate 15-92 MG CP24, Take 1 capsule by mouth daily for 30 days. , Disp: 30 capsule, Rfl: 0    TRESIBA FLEXTOUCH 200 UNIT/ML SOPN, INJECT 140 UNITS UNDER THE SKIN DAILY, Disp: 24 mL, Rfl: 3    JARDIANCE 10 MG tablet, TAKE ONE TABLET BY MOUTH DAILY, Disp: 90 tablet, Rfl: 1    albuterol sulfate HFA (PROVENTIL;VENTOLIN;PROAIR) 108 (90 Base) MCG/ACT inhaler, Inhale 2 puffs into the lungs every 6 hours as needed for Wheezing, Disp: 1 each, Rfl: 1    Continuous Blood Gluc Transmit (DEXCOM G6 TRANSMITTER) MISC, For continuous glucose monitoring .   Diagnosis code E10.9, Disp: 1 each, Rfl: 3    busPIRone (BUSPAR) 7.5 MG tablet, Take 1 tablet by mouth 2 times daily, Disp: 60 tablet, Rfl: 5    escitalopram (LEXAPRO) 20 MG tablet, TAKE ONE TABLET BY MOUTH DAILY, Disp: 30 tablet, Rfl: 5    TRULICITY 3 KQ/3.7CA SOPN, INJECT 3 MG UNDER THE SKIN ONCE WEEKLY, Disp: 6 mL, Rfl: 3    Continuous Blood Gluc Sensor (FREESTYLE SHARDA 2 SENSOR) MISC, Every 2 week as needed, Disp: 2 each, Rfl: 3    Norgestim-Eth Estrad Triphasic 0.18/0.215/0.25 MG-35 MCG TABS, Take 1 tablet by mouth daily, Disp: 84 tablet, Rfl: 3    vitamin D (ERGOCALCIFEROL) 1.25 MG (81235 UT) CAPS capsule, Take 1 capsule by mouth once a week, Disp: 8 capsule, Rfl: 0    insulin lispro, 1 Unit Dial, (HUMALOG KWIKPEN) 100 UNIT/ML SOPN, INJECT 20-24  UNITS SUBCUTANEOUSLY BEFORE MEALS 3 TIMES DAILY, Disp: 10 pen, Rfl: 5    montelukast (SINGULAIR) 10 MG tablet, Take 1 tablet by mouth nightly, Disp: 30 tablet, Rfl: 5    Continuous Blood Gluc  (FREESTYLE SHARDA 2 READER) [place], As needed, Disp: 1 each, Rfl: 0    ondansetron (ZOFRAN) 8 MG tablet, Take 1 tablet by mouth every 8 hours as needed for Nausea, Disp: 24 tablet, Rfl: 1    cyclobenzaprine (FLEXERIL) 10 MG tablet, Take 1 po q 8 hours prn low back pain/ tension headaches., Disp: 45 tablet, Rfl: 2 Insulin Pen Needle (B-D UF III MINI PEN NEEDLES) 31G X 5 MM MISC, 4 times a day, Disp: 150 each, Rfl: 6    lisinopril (PRINIVIL;ZESTRIL) 10 MG tablet, TAKE ONE TABLET BY MOUTH DAILY, Disp: 30 tablet, Rfl: 5      Review of Systems - History obtained from the patient  General ROS: negative  Psychological ROS: negative  Endocrine ROS: negative  Respiratory ROS: negative  Cardiovascular ROS: negative  Gastrointestinal ROS:negative  Genito-Urinary ROS: negative  Musculoskeletal ROS: negative   Skin ROS: negative    Physical Exam   Vitals Reviewed   Constitutional: Patient is oriented to person, place, and time. Patient appears well-developed and well-nourished. Patient is active and cooperative. Non-toxic appearance. No distress. Neck: Trachea normal and normal range of motion. No JVD present. Pulmonary/Chest: Effort normal. No accessory muscle usage or stridor. No apnea. No respiratory distress. Cardiovascular: Normal rate and no JVD. Abdominal: Normal appearance. Patient exhibits no distension. Abdomen is soft, obese, non tender. Musculoskeletal: Normal range of motion. Patient exhibits no edema. Neurological: Patient is alert and oriented to person, place, and time. Patient has normal strength. GCS eye subscore is 4. GCS verbal subscore is 5. GCS motor subscore is 6. Skin: Skin is warm and dry. No abrasion and no rash noted. Patient is not diaphoretic. No cyanosis or erythema. Psychiatric: Patient has a normal mood and affect. Speech is normal and behavior is normal. Cognition and memory are normal.       A/P    Amol Headings is 33 y.o. female, Body mass index is 72.9 kg/m². pre surgery, has lost 5# since last visit. The patient underwent dietary counseling with registered dietician. I have reviewed, discussed and agree with the dietary plan. Patient is trying hard to keep good dietary and behavior modifications. Patient is monitoring portion sizes, food choices and liquid calories.   Patient is trying to exercise regularly as much as possible. We discussed how her weight affects her overall health including:  Seth Us was seen today for obesity. Diagnoses and all orders for this visit:    Morbid obesity with BMI of 70 and over, adult ([address]    TAY (obstructive sleep apnea)    Type 2 diabetes mellitus without complication, with long-term current use of insulin ([address]       and importance of weight loss to alleviate those co morbid conditions. I encouraged the patient to continue exercise and keeping healthy eating habits. Discussed pre-op labs and work up till now. Also counseled the patient extensively on Surgery. RTC in 4 weeks  Obtain rest of pre-op work up / clearances  Diet and Exercise      Patient advised that its their responsibility to follow up for studies and/or labs ordered today.      Iain Eid

## 2022-10-06 ENCOUNTER — TELEMEDICINE (OUTPATIENT)
Dept: BARIATRICS/WEIGHT MGMT | Age: 32
End: 2022-10-06
Payer: COMMERCIAL

## 2022-10-06 VITALS — HEIGHT: 63 IN | WEIGHT: 293 LBS | BODY MASS INDEX: 51.91 KG/M2

## 2022-10-06 DIAGNOSIS — Z71.3 DIETARY COUNSELING AND SURVEILLANCE: ICD-10-CM

## 2022-10-06 DIAGNOSIS — E66.01 MORBID OBESITY WITH BMI OF 70 AND OVER, ADULT (HCC): Primary | ICD-10-CM

## 2022-10-06 PROCEDURE — 99214 OFFICE O/P EST MOD 30 MIN: CPT | Performed by: FAMILY MEDICINE

## 2022-10-06 ASSESSMENT — ENCOUNTER SYMPTOMS
EYE PAIN: 0
ABDOMINAL DISTENTION: 0
VOMITING: 0
ABDOMINAL PAIN: 0
CHOKING: 0
SHORTNESS OF BREATH: 0
APNEA: 0
CONSTIPATION: 0
WHEEZING: 0
CHEST TIGHTNESS: 0
PHOTOPHOBIA: 0
COUGH: 0
BLOOD IN STOOL: 0
NAUSEA: 0
DIARRHEA: 0

## 2022-10-06 NOTE — PROGRESS NOTES
capsule, Rfl: 0    TRESIBA FLEXTOUCH 200 UNIT/ML SOPN, INJECT 140 UNITS UNDER THE SKIN DAILY, Disp: 24 mL, Rfl: 3    JARDIANCE 10 MG tablet, TAKE ONE TABLET BY MOUTH DAILY, Disp: 90 tablet, Rfl: 1    albuterol sulfate HFA (PROVENTIL;VENTOLIN;PROAIR) 108 (90 Base) MCG/ACT inhaler, Inhale 2 puffs into the lungs every 6 hours as needed for Wheezing, Disp: 1 each, Rfl: 1    Continuous Blood Gluc Transmit (DEXCOM G6 TRANSMITTER) MISC, For continuous glucose monitoring .   Diagnosis code E10.9, Disp: 1 each, Rfl: 3    busPIRone (BUSPAR) 7.5 MG tablet, Take 1 tablet by mouth 2 times daily, Disp: 60 tablet, Rfl: 5    escitalopram (LEXAPRO) 20 MG tablet, TAKE ONE TABLET BY MOUTH DAILY, Disp: 30 tablet, Rfl: 5    TRULICITY 3 KT/7.5SW SOPN, INJECT 3 MG UNDER THE SKIN ONCE WEEKLY, Disp: 6 mL, Rfl: 3    Continuous Blood Gluc Sensor (FREESTYLE SHARDA 2 SENSOR) [facility], Every 2 week as needed, Disp: 2 each, Rfl: 3    Norgestim-Eth Estrad Triphasic 0.18/0.215/0.25 MG-35 MCG TABS, Take 1 tablet by mouth daily, Disp: 84 tablet, Rfl: 3    vitamin D (ERGOCALCIFEROL) 1.25 MG (32015 UT) CAPS capsule, Take 1 capsule by mouth once a week, Disp: 8 capsule, Rfl: 0    insulin lispro, 1 Unit Dial, (HUMALOG KWIKPEN) 100 UNIT/ML SOPN, INJECT 20-24  UNITS SUBCUTANEOUSLY BEFORE MEALS 3 TIMES DAILY, Disp: 10 pen, Rfl: 5    montelukast (SINGULAIR) 10 MG tablet, Take 1 tablet by mouth nightly, Disp: 30 tablet, Rfl: 5    Continuous Blood Gluc  (FREESTYLE SHARDA 2 READER) [facility], As needed, Disp: 1 each, Rfl: 0    ondansetron (ZOFRAN) 8 MG tablet, Take 1 tablet by mouth every 8 hours as needed for Nausea, Disp: 24 tablet, Rfl: 1    cyclobenzaprine (FLEXERIL) 10 MG tablet, Take 1 po q 8 hours prn low back pain/ tension headaches., Disp: 45 tablet, Rfl: 2    Insulin Pen Needle (B-D UF III MINI PEN NEEDLES) 31G X 5 MM MISC, 4 times a day, Disp: 150 each, Rfl: 6    Patient Active Problem List   Diagnosis    Asthma    Polycystic ovarian disease Obstructive sleep apnea    Pure hypercholesterolemia    Morbid obesity (HCC)    Allergic rhinitis    Acne    Diabetes mellitus (HCC)    PCOS (polycystic ovarian syndrome)    TAY (obstructive sleep apnea)    Obesity    Folic acid deficiency    Headache    Skin lesion    Major depressive disorder, single episode, mild (HCC)    Mass of subcutaneous tissue of back    Vitamin D deficiency    Depression with anxiety    Controlled type 2 diabetes mellitus with complication, with long-term current use of insulin ([address]    Morbid obesity with BMI of 70 and over, adult ([address]       Review of Systems   Constitutional:  Negative for fatigue. Eyes:  Negative for photophobia, pain and visual disturbance. Respiratory:  Negative for apnea, cough, choking, chest tightness, shortness of breath and wheezing. Cardiovascular:  Negative for chest pain, palpitations and leg swelling. Gastrointestinal:  Negative for abdominal distention, abdominal pain, blood in stool, constipation, diarrhea, nausea and vomiting. Endocrine: Negative for cold intolerance and heat intolerance. Musculoskeletal:  Negative for arthralgias and myalgias. Skin:  Negative for rash. Neurological:  Negative for dizziness, tremors, syncope, weakness, numbness and headaches. Psychiatric/Behavioral:  Negative for agitation, confusion, decreased concentration, dysphoric mood, hallucinations, sleep disturbance and suicidal ideas. The patient is not nervous/anxious and is not hyperactive. Physical Exam  Constitutional:       Appearance: She is well-developed. HENT:      Head: Normocephalic. Eyes:      Conjunctiva/sclera: Conjunctivae normal.   Abdominal:      General: Abdomen is protuberant. Musculoskeletal:         General: No swelling. Neurological:      Mental Status: She is alert and oriented to person, place, and time. Psychiatric:         Mood and Affect: Mood normal.         Behavior: Behavior normal.         Thought Content:  Thought content normal.         Judgment: Judgment normal.       Orders Only on 09/13/2022   Component Date Value Ref Range Status    Vit D, 25-Hydroxy 09/13/2022 21.7 (A)  >=30 ng/mL Final    Comment: <=20 ng/mL. ........... Lucyann Push Deficient  21-29 ng/mL. ......... Lucyann Push Insufficient  >=30 ng/mL. ........ Lucyann Push Sufficient      Vitamin B-12 09/13/2022 557  211 - 911 pg/mL Final    Folate 09/13/2022 3.67 (A)  4.78 - 24.20 ng/mL Final    Comment: Effective 11-15-16 10:00am EST  Please note reference ranges have  changed for Folate. TSH 09/13/2022 2.78  0.27 - 4.20 uIU/mL Final    Cholesterol, Total 09/13/2022 187  0 - 199 mg/dL Final    Triglycerides 09/13/2022 77  0 - 150 mg/dL Final    HDL 09/13/2022 64 (A)  40 - 60 mg/dL Final    Comment: An HDL cholesterol less than 40 mg/dL is low and  constitutes a coronary heart disease risk factor. An HDL cholesterol greater than 60 mg/dL is a  negative risk factor for coronary heart disease.       LDL Calculated 09/13/2022 108 (A)  <100 mg/dL Final    VLDL Cholesterol Calculated 09/13/2022 15  Not Established mg/dL Final    Iron 09/13/2022 46  37 - 145 ug/dL Final    TIBC 09/13/2022 364  260 - 445 ug/dL Final    Iron Saturation 09/13/2022 13 (A)  15 - 50 % Final    Hemoglobin A1C 09/13/2022 6.2  See comment % Final    Comment: Comment:  Diagnosis of Diabetes: > or = 6.5%  Increased risk of diabetes (Prediabetes): 5.7-6.4%  Glycemic Control: Nonpregnant Adults: <7.0%                    Pregnant: <6.0%        eAG 09/13/2022 131.2  mg/dL Final    Sodium 09/13/2022 142  136 - 145 mmol/L Final    Potassium 09/13/2022 4.3  3.5 - 5.1 mmol/L Final    Chloride 09/13/2022 103  99 - 110 mmol/L Final    CO2 09/13/2022 25  21 - 32 mmol/L Final    Anion Gap 09/13/2022 14  3 - 16 Final    Glucose 09/13/2022 77  70 - 99 mg/dL Final    BUN 09/13/2022 14  7 - 20 mg/dL Final    Creatinine 09/13/2022 <0.5 (A)  0.6 - 1.1 mg/dL Final    GFR Non- 09/13/2022 >60  >60 Final    Comment: >60 mL/min/1.73m2 EGFR, calc. for ages 25 and older using the  MDRD formula (not corrected for weight), is valid for stable  renal function. GFR  09/13/2022 >60  >60 Final    Comment: Chronic Kidney Disease: less than 60 ml/min/1.73 sq.m. Kidney Failure: less than 15 ml/min/1.73 sq.m. Results valid for patients 18 years and older. Calcium 09/13/2022 9.0  8.3 - 10.6 mg/dL Final    Total Protein 09/13/2022 6.9  6.4 - 8.2 g/dL Final    Albumin 09/13/2022 4.3  3.4 - 5.0 g/dL Final    Albumin/Globulin Ratio 09/13/2022 1.7  1.1 - 2.2 Final    Total Bilirubin 09/13/2022 0.3  0.0 - 1.0 mg/dL Final    Alkaline Phosphatase 09/13/2022 90  40 - 129 U/L Final    ALT 09/13/2022 17  10 - 40 U/L Final    AST 09/13/2022 13 (A)  15 - 37 U/L Final    WBC 09/13/2022 11.0  4.0 - 11.0 K/uL Final    RBC 09/13/2022 4.93  4.00 - 5.20 M/uL Final    Hemoglobin 09/13/2022 13.2  12.0 - 16.0 g/dL Final    Hematocrit 09/13/2022 40.0  36.0 - 48.0 % Final    MCV 09/13/2022 81.1  80.0 - 100.0 fL Final    MCH 09/13/2022 26.8  26.0 - 34.0 pg Final    MCHC 09/13/2022 33.0  31.0 - 36.0 g/dL Final    RDW 09/13/2022 16.4 (A)  12.4 - 15.4 % Final    Platelets 52/21/6540 365  135 - 450 K/uL Final    MPV 09/13/2022 7.2  5.0 - 10.5 fL Final    Neutrophils % 09/13/2022 71.4  % Final    Lymphocytes % 09/13/2022 22.8  % Final    Monocytes % 09/13/2022 4.6  % Final    Eosinophils % 09/13/2022 0.8  % Final    Basophils % 09/13/2022 0.4  % Final    Neutrophils Absolute 09/13/2022 7.8 (A)  1.7 - 7.7 K/uL Final    Lymphocytes Absolute 09/13/2022 2.5  1.0 - 5.1 K/uL Final    Monocytes Absolute 09/13/2022 0.5  0.0 - 1.3 K/uL Final    Eosinophils Absolute 09/13/2022 0.1  0.0 - 0.6 K/uL Final    Basophils Absolute 09/13/2022 0.0  0.0 - 0.2 K/uL Final         Assessment and Plan:  1. Morbid obesity with BMI of 70 and over, adult ([address]  Gaining weight. Follow low carb/goyo meal plan as prescribed. Meal plan/prep ahead of time. Avoid trigger foods.   Increase physical activity as able/tolerated   Continue Qsymia. F/u 4 weeks. - Phentermine-Topiramate 15-92 MG CP24; Take 1 capsule by mouth daily for 30 days. Dispense: 30 capsule; Refill: 0    2. Dietary counseling and surveillance  As above. Nutrition Plan: [] LCHF/Ketogenic   [x] Modified low-calorie diet (low carb/low-goyo)               [] Low-calorie diet    [] Maintenance       []Other        Exercise: [] Cardio     [] Resistance/strength training                       [x] ACSM recommendations (150 minutes/week in active weight loss)               Behavior: [x] Motivational interviewing performed    [] Referral for counseling                         [x] Discussed strategies to overcome habits/challenges for focus         [] Stress management   [x] Stimulus control         [] Sleep hygiene      Reviewed:  [x] Nutrition and the importance of regular protein intake  [x] Hidden carbohydrate sources  [x] Alcohol use  [x] Tobacco use   [x] Drug use- Denies  [x] Importance of exercise and reducing sedentary time  [x] Treatment consent- Patient understands and agrees with the treatment plan   [x] Proper use of medication and side effects  [x] OARRS report      Controlled Substance Monitoring:    RX Monitoring 11/15/2017   Attestation The Prescription Monitoring Report for this patient was reviewed today. Periodic Controlled Substance Monitoring Possible medication side effects, risk of tolerance and/or dependence, and alternative treatments discussed. ;No signs of potential drug abuse or diversion identified. Patient denies any history of cardiovascular disease (e.g., CAD, stroke, arrhythmias, CHF, uncontrolled HTN), seizure disorder, MAOI use within the last 2 weeks, hyperthyroidism, glaucoma, agitated states, history of drug abuse, pregnancy, nursing, known hypersensitivity to the prescribing meds, history of pancreatitis, or personal or family history of thyroid medullary cancer.       New start date: 9/9/22  12 weeks: 12/9/22  Starting weight: 402 pounds    Goal: At least 5% (20 pounds)   Total weight loss: + 2 pounds           Key dietary points:    - Meats (preferably organic or grass fed) are great sources of protein and have no carbohydrates. - Recommend coconut, olive, avocado, or almond oils. - When buying dairy, choose regular or full fat options. - Choose vegetables that grow above ground as they are generally lower in carbohydrates and higher in fiber.  - Avoid starches such as bread, rice, potatoes, pasta and all sources of simple sugars (desserts, soda, breakfast cereals). - Choose beverages that are calorie and sugar free. Reminder regarding weight loss medications: You must be seen in office every 2-4 weeks to haveyour prescriptions refilled. If you are off of your medication for longer than 7 days, you will not be able to restart the medication for at least 6 months. Always call our office to report any side effects. Females, it is your responsibility to obtain negative pregnancy tests each month. No orders of the defined types were placed in this encounter. No follow-ups on file. Courtney Deshpande is a 33 y.o. female being evaluated by a Virtual Visit (video visit) encounter to address concerns as mentioned above. A caregiver was present when appropriate. Due to this being a TeleHealth encounter (During VXYDV-62 public health emergency), evaluation of the following organ systems was limited: Vitals/Constitutional/EENT/Resp/CV/GI//MS/Neuro/Skin/Heme-Lymph-Imm. Pursuant to the emergency declaration under the [facility], [address] [place] [address] authority and the [employer] Act, this Virtual Visit was conducted with patient's (and/or legal guardian's) consent, to reduce the patient's risk of exposure to COVID-19 and provide necessary medical care.   The patient (and/or legal guardian) has also been advised to contact this office for worsening conditions or problems, and seek emergency medical treatment and/or call 911 if deemed necessary.

## 2022-10-14 ENCOUNTER — OFFICE VISIT (OUTPATIENT)
Dept: FAMILY MEDICINE CLINIC | Age: 32
End: 2022-10-14
Payer: COMMERCIAL

## 2022-10-14 VITALS
OXYGEN SATURATION: 98 % | HEART RATE: 76 BPM | TEMPERATURE: 96.8 F | SYSTOLIC BLOOD PRESSURE: 134 MMHG | BODY MASS INDEX: 73 KG/M2 | WEIGHT: 293 LBS | DIASTOLIC BLOOD PRESSURE: 84 MMHG

## 2022-10-14 DIAGNOSIS — E11.8 CONTROLLED TYPE 2 DIABETES MELLITUS WITH COMPLICATION, WITH LONG-TERM CURRENT USE OF INSULIN (HCC): Primary | Chronic | ICD-10-CM

## 2022-10-14 DIAGNOSIS — R03.0 ELEVATED BLOOD PRESSURE READING: ICD-10-CM

## 2022-10-14 DIAGNOSIS — Z23 NEEDS FLU SHOT: ICD-10-CM

## 2022-10-14 DIAGNOSIS — F32.0 MAJOR DEPRESSIVE DISORDER, SINGLE EPISODE, MILD (HCC): Chronic | ICD-10-CM

## 2022-10-14 DIAGNOSIS — E66.01 MORBID OBESITY WITH BMI OF 70 AND OVER, ADULT (HCC): Chronic | ICD-10-CM

## 2022-10-14 DIAGNOSIS — E28.2 PCOS (POLYCYSTIC OVARIAN SYNDROME): Chronic | ICD-10-CM

## 2022-10-14 DIAGNOSIS — G47.33 OBSTRUCTIVE SLEEP APNEA: Chronic | ICD-10-CM

## 2022-10-14 DIAGNOSIS — Z79.4 CONTROLLED TYPE 2 DIABETES MELLITUS WITH COMPLICATION, WITH LONG-TERM CURRENT USE OF INSULIN (HCC): Primary | Chronic | ICD-10-CM

## 2022-10-14 DIAGNOSIS — G47.33 OSA (OBSTRUCTIVE SLEEP APNEA): ICD-10-CM

## 2022-10-14 PROCEDURE — 99214 OFFICE O/P EST MOD 30 MIN: CPT | Performed by: FAMILY MEDICINE

## 2022-10-14 PROCEDURE — 90471 IMMUNIZATION ADMIN: CPT | Performed by: FAMILY MEDICINE

## 2022-10-14 PROCEDURE — 90674 CCIIV4 VAC NO PRSV 0.5 ML IM: CPT | Performed by: FAMILY MEDICINE

## 2022-10-14 PROCEDURE — 3044F HG A1C LEVEL LT 7.0%: CPT | Performed by: FAMILY MEDICINE

## 2022-10-14 ASSESSMENT — PATIENT HEALTH QUESTIONNAIRE - PHQ9
SUM OF ALL RESPONSES TO PHQ QUESTIONS 1-9: 0
1. LITTLE INTEREST OR PLEASURE IN DOING THINGS: 2
4. FEELING TIRED OR HAVING LITTLE ENERGY: 2
2. FEELING DOWN, DEPRESSED OR HOPELESS: 2
SUM OF ALL RESPONSES TO PHQ9 QUESTIONS 1 & 2: 4
SUM OF ALL RESPONSES TO PHQ9 QUESTIONS 1 & 2: 0
SUM OF ALL RESPONSES TO PHQ QUESTIONS 1-9: 0
SUM OF ALL RESPONSES TO PHQ QUESTIONS 1-9: 0
2. FEELING DOWN, DEPRESSED OR HOPELESS: 0
7. TROUBLE CONCENTRATING ON THINGS, SUCH AS READING THE NEWSPAPER OR WATCHING TELEVISION: 0
10. IF YOU CHECKED OFF ANY PROBLEMS, HOW DIFFICULT HAVE THESE PROBLEMS MADE IT FOR YOU TO DO YOUR WORK, TAKE CARE OF THINGS AT HOME, OR GET ALONG WITH OTHER PEOPLE: 1
SUM OF ALL RESPONSES TO PHQ QUESTIONS 1-9: 10
SUM OF ALL RESPONSES TO PHQ QUESTIONS 1-9: 10
5. POOR APPETITE OR OVEREATING: 2
8. MOVING OR SPEAKING SO SLOWLY THAT OTHER PEOPLE COULD HAVE NOTICED. OR THE OPPOSITE, BEING SO FIGETY OR RESTLESS THAT YOU HAVE BEEN MOVING AROUND A LOT MORE THAN USUAL: 0
9. THOUGHTS THAT YOU WOULD BE BETTER OFF DEAD, OR OF HURTING YOURSELF: 0
SUM OF ALL RESPONSES TO PHQ QUESTIONS 1-9: 10
1. LITTLE INTEREST OR PLEASURE IN DOING THINGS: 0
SUM OF ALL RESPONSES TO PHQ QUESTIONS 1-9: 0
6. FEELING BAD ABOUT YOURSELF - OR THAT YOU ARE A FAILURE OR HAVE LET YOURSELF OR YOUR FAMILY DOWN: 0
3. TROUBLE FALLING OR STAYING ASLEEP: 2
SUM OF ALL RESPONSES TO PHQ QUESTIONS 1-9: 10

## 2022-10-14 ASSESSMENT — COLUMBIA-SUICIDE SEVERITY RATING SCALE - C-SSRS
1. WITHIN THE PAST MONTH, HAVE YOU WISHED YOU WERE DEAD OR WISHED YOU COULD GO TO SLEEP AND NOT WAKE UP?: NO
2. HAVE YOU ACTUALLY HAD ANY THOUGHTS OF KILLING YOURSELF?: NO
6. HAVE YOU EVER DONE ANYTHING, STARTED TO DO ANYTHING, OR PREPARED TO DO ANYTHING TO END YOUR LIFE?: NO

## 2022-10-14 NOTE — PROGRESS NOTES
Here for f/u and recheck of diabetes mellitus, obesity,     Pt has been doing great with blood sugars, states that things are improving and a1c is down nicely to 6.8.  last bloodwork done a few weeks ago were excellent. Pt continues on phentermine/topiramate through dr. Christian Salguero. Pt has been doing well to stay consistent with her meds, and has been getting to the gym regularly when she can. Pt is continuing to work with the medical side of weight loss, and is considering weight loss surgery down the road. Pt states that there is a lot going on at work, a lot of stress. Pt also has some personal stressors with a friend who needed emergency surgery. Pt states she has not processed things yet. Pt is happy with the combination of lexapro, buspar. Pt does work with dr. Keila Mtz monthly and that is doing great for her. Pt continues to struggle with her sleep, has been struggling to get a new CPAP machine. Pts sleep study was significant with AHI of ~ 50. The [employer] company says that they are waiting on a machine    blood sugars are doing well but states that she does bottom out at times. She will see blood sugars in 40-60's and is concerned that she is getting too much insulin. Pt getting 140 units qhs tresiba and humalog 18 units with meals. Pt seeing endo next week      Except as noted above in the history of present illness, the review of systems is  negative for headache, vision changes, chest pain, shortness of breath, abdominal pain, urinary sx, bowel changes. Past medical, surgical, and social history reviewed and updated  Medications and allergies reviewed and updated        O: /84   Pulse 76   Temp 96.8 °F (36 °C)   Wt (!) 405 lb 9.6 oz (184 kg)   LMP 09/12/2022 (Approximate)   SpO2 98%   BMI 73.00 kg/m²   GEN: No acute distress, cooperative, well nourished, alert. HEENT: PEERLA, EOMI , normocephalic/atraumatic, nares and oropharynx clear.   Mucous membranes normal, Tympanic membranes clear bilaterally. Neck: soft, supple, no thyromegaly, mass, no Lymphadenopathy  CV: Regular rate and rhythm, no murmur, rubs, gallops. No edema. Resp: Clear to auscultation bilaterally good air entry bilaterally  No crackles, wheeze. Breathing comfortably. Psych: mood stable, No suicidal thoughts or ideation  mod stressors        Current Outpatient Medications   Medication Sig Dispense Refill    Phentermine-Topiramate 15-92 MG CP24 Take 1 capsule by mouth daily for 30 days. 30 capsule 0    lisinopril (PRINIVIL;ZESTRIL) 10 MG tablet TAKE ONE TABLET BY MOUTH DAILY 30 tablet 5    TRESIBA FLEXTOUCH 200 UNIT/ML SOPN INJECT 140 UNITS UNDER THE SKIN DAILY 24 mL 3    JARDIANCE 10 MG tablet TAKE ONE TABLET BY MOUTH DAILY 90 tablet 1    albuterol sulfate HFA (PROVENTIL;VENTOLIN;PROAIR) 108 (90 Base) MCG/ACT inhaler Inhale 2 puffs into the lungs every 6 hours as needed for Wheezing 1 each 1    Continuous Blood Gluc Transmit (DEXCOM G6 TRANSMITTER) MISC For continuous glucose monitoring .   Diagnosis code E10.9 1 each 3    busPIRone (BUSPAR) 7.5 MG tablet Take 1 tablet by mouth 2 times daily 60 tablet 5    escitalopram (LEXAPRO) 20 MG tablet TAKE ONE TABLET BY MOUTH DAILY 30 tablet 5    TRULICITY 3 VI/1.1PZ SOPN INJECT 3 MG UNDER THE SKIN ONCE WEEKLY 6 mL 3    Continuous Blood Gluc Sensor (FREESTYLE SHARDA 2 SENSOR) MISC Every 2 week as needed 2 each 3    Norgestim-Eth Estrad Triphasic 0.18/0.215/0.25 MG-35 MCG TABS Take 1 tablet by mouth daily 84 tablet 3    vitamin D (ERGOCALCIFEROL) 1.25 MG (82567 UT) CAPS capsule Take 1 capsule by mouth once a week 8 capsule 0    insulin lispro, 1 Unit Dial, (HUMALOG KWIKPEN) 100 UNIT/ML SOPN INJECT 20-24  UNITS SUBCUTANEOUSLY BEFORE MEALS 3 TIMES DAILY 10 pen 5    montelukast (SINGULAIR) 10 MG tablet Take 1 tablet by mouth nightly 30 tablet 5    Continuous Blood Gluc  (FREESTYLE SHARDA 2 READER) JERICA As needed 1 each 0    ondansetron (ZOFRAN) 8 MG tablet Take 1 tablet by mouth every 8 hours as needed for Nausea 24 tablet 1    cyclobenzaprine (FLEXERIL) 10 MG tablet Take 1 po q 8 hours prn low back pain/ tension headaches. 45 tablet 2    Insulin Pen Needle (B-D UF III MINI PEN NEEDLES) 31G X 5 MM MISC 4 times a day 150 each 6     No current facility-administered medications for this visit. ASSESSMENT / PLAN:    1. Controlled type 2 diabetes mellitus with complication, with long-term current use of insulin ([facility] [address]  Doing great with improved blood sugars  Cont lifestyle mgt, diet/exericise  F/uw with endo  Seeing some mild decrease in blood sugars, consider splitting long-acting insulin to BID    2. Morbid obesity with BMI of 70 and over, adult ([facility] [address]  Cont f/u with bariatrics    3. Major depressive disorder, single episode, mild (HCC)  Mood stable, doing well despite stressors  Cont supportive therapy  Cont buspar, lexapro  Cont counseling    4. TAY (obstructive sleep apnea)  Working to get new CPAP thearpy    5. PCOS (polycystic ovarian syndrome)  stable    6. Obstructive sleep apnea  As above    7. Elevated blood pressure reading  Recheck improved    8. Needs flu shot  Given today           Follow-up appointment:   3 mos/prn     Discussed use, benefit, and side effects of all prescribed medications. Barriers to medication compliance addressed. All patient questions answered. Pt voiced understanding. When applicable, patient's outside records were reviewed through [facility]. The patient has signed appropriate paperworks/consents.

## 2022-10-17 ENCOUNTER — TELEPHONE (OUTPATIENT)
Dept: ENDOCRINOLOGY | Age: 32
End: 2022-10-17

## 2022-10-17 ENCOUNTER — OFFICE VISIT (OUTPATIENT)
Dept: ENDOCRINOLOGY | Age: 32
End: 2022-10-17

## 2022-10-17 VITALS
DIASTOLIC BLOOD PRESSURE: 72 MMHG | TEMPERATURE: 98 F | HEART RATE: 78 BPM | BODY MASS INDEX: 51.91 KG/M2 | RESPIRATION RATE: 14 BRPM | HEIGHT: 63 IN | SYSTOLIC BLOOD PRESSURE: 128 MMHG | WEIGHT: 293 LBS

## 2022-10-17 DIAGNOSIS — Z79.4 CONTROLLED TYPE 2 DIABETES MELLITUS WITH COMPLICATION, WITH LONG-TERM CURRENT USE OF INSULIN (HCC): Primary | ICD-10-CM

## 2022-10-17 DIAGNOSIS — E11.8 CONTROLLED TYPE 2 DIABETES MELLITUS WITH COMPLICATION, WITH LONG-TERM CURRENT USE OF INSULIN (HCC): Primary | ICD-10-CM

## 2022-10-17 RX ORDER — SEMAGLUTIDE 2.68 MG/ML
INJECTION, SOLUTION SUBCUTANEOUS
Qty: 3 ML | Refills: 3 | Status: SHIPPED | OUTPATIENT
Start: 2022-10-17

## 2022-10-17 NOTE — PROGRESS NOTES
Seen as f/u patient for diabetes    Interim;       Using CGM  Glucose stable  50 lb weight loss  Some night lows  Needs to loose more weight for the surgery    Diagnosed with Type 2 diabetes mellitus at age 16 years  Known diabetic complications: none   Uncontrolled  Insulin started a few year after diagnosis  On metformin initially at time of diagnosis    Current diabetic medications     Tresiba 319 units   Trulicity 3mg  Novolog 20 units AC TID     SSI 2 for 50 >150  Jardiance 10mg    Did not tolerate metformin    Last A1c  6.2%<----- 8%<---- 7.8%<----- 9.6%<----- 11.8%<-----9.6%<-----9.4%<-----10.1%<------- 11.7 on 7/18 <--- 11.3 <--- 10.9    Prior visit with dietician: Yes   Current diet: on average, 3 meals per day   Current exercise: walking   Current monitoring regimen: home blood tests - not checking for a month    Has brought blood glucose log/meter: yes   Home blood sugar records: CGM    10/4-10/17  92 % in range  average 135    No significant lows    Any episodes of hypoglycemia? --    No Hx of CAD , PVD, CVA    Hyperlipidemia:   Not on medication  uncontrolled   on 7/18   on 1/19   on 8/20   on 11/21    Last eye exam: 2020  Last foot exam: 1/19  Last microalbumin to creatinine ratio:11/21    She has a h/o PCOS, on OCP    H/o obesity. Has been gaining weight.  She is on low CHO diet  Does reports ate more, increased appetite    She has vit D deficiency  Last 7 on 4.19    Takes vit D 50,000 IU1/week  Restarted 4/21  Level was 25      SH: Mom is our clinic patient, maureen carrillo    Past Medical History:   Diagnosis Date    Acne     Allergic rhinitis     Anxiety     Asthma     Cellulitis     on back/ on bactrim for/ pcp aware    COVID-19 12/2020    Depression     Diabetes mellitus type 1 ([facility] [address] 1/53    Folic acid deficiency     Hyperlipidemia     Morbid obesity with BMI of 70 and over, adult ([facility] [address]     Obesity     Obstructive sleep apnea 1/5/2011    TAY (obstructive sleep apnea) 7/07    PCOS (polycystic ovarian syndrome)     Pre-operative clearance     Pyelonephritis 12/08    left    Vitamin D deficiency      Past Surgical History:   Procedure Laterality Date    CHOLECYSTECTOMY  1/15/2013    MULTIPORT ROBOTIC ASSISTED LAPAROSCOPIC CHOLECYSTECTOMY    CYST REMOVAL      OTHER SURGICAL HISTORY  2009    Cyst/Mass excised from posterior neck    OTHER SURGICAL HISTORY  12/15    excision back lesion-Dr. Esvin Luis    WISDOM TOOTH EXTRACTION       Current Outpatient Medications   Medication Sig Dispense Refill    Phentermine-Topiramate 15-92 MG CP24 Take 1 capsule by mouth daily for 30 days. 30 capsule 0    lisinopril (PRINIVIL;ZESTRIL) 10 MG tablet TAKE ONE TABLET BY MOUTH DAILY 30 tablet 5    TRESIBA FLEXTOUCH 200 UNIT/ML SOPN INJECT 140 UNITS UNDER THE SKIN DAILY 24 mL 3    JARDIANCE 10 MG tablet TAKE ONE TABLET BY MOUTH DAILY 90 tablet 1    albuterol sulfate HFA (PROVENTIL;VENTOLIN;PROAIR) 108 (90 Base) MCG/ACT inhaler Inhale 2 puffs into the lungs every 6 hours as needed for Wheezing 1 each 1    Continuous Blood Gluc Transmit (DEXCOM G6 TRANSMITTER) MISC For continuous glucose monitoring .   Diagnosis code E10.9 1 each 3    busPIRone (BUSPAR) 7.5 MG tablet Take 1 tablet by mouth 2 times daily 60 tablet 5    escitalopram (LEXAPRO) 20 MG tablet TAKE ONE TABLET BY MOUTH DAILY 30 tablet 5    TRULICITY 3 TO/9.5PJ SOPN INJECT 3 MG UNDER THE SKIN ONCE WEEKLY 6 mL 3    Continuous Blood Gluc Sensor (FREESTYLE SHARDA 2 SENSOR) MISC Every 2 week as needed 2 each 3    Norgestim-Eth Estrad Triphasic 0.18/0.215/0.25 MG-35 MCG TABS Take 1 tablet by mouth daily 84 tablet 3    vitamin D (ERGOCALCIFEROL) 1.25 MG (86617 UT) CAPS capsule Take 1 capsule by mouth once a week 8 capsule 0    insulin lispro, 1 Unit Dial, (HUMALOG KWIKPEN) 100 UNIT/ML SOPN INJECT 20-24  UNITS SUBCUTANEOUSLY BEFORE MEALS 3 TIMES DAILY 10 pen 5    montelukast (SINGULAIR) 10 MG tablet Take 1 tablet by mouth nightly 30 tablet 5    Continuous Blood Gluc  (FREESTYLE SHARDA 2 READER) JERICA As needed 1 each 0    ondansetron (ZOFRAN) 8 MG tablet Take 1 tablet by mouth every 8 hours as needed for Nausea 24 tablet 1    cyclobenzaprine (FLEXERIL) 10 MG tablet Take 1 po q 8 hours prn low back pain/ tension headaches. 45 tablet 2    Insulin Pen Needle (B-D UF III MINI PEN NEEDLES) 31G X 5 MM MISC 4 times a day 150 each 6     No current facility-administered medications for this visit. Review of Systems  Please see scanned document dated and signed      Objective:       Constitutional: Well-developed, appears stated age, cooperative, in no acute distress  H/E/N/M/T:atraumatic, normocephalic, external ears, nose, lips normal without lesions  Eyes: Lids, lashes, conjunctivae and sclerae normal, No proptosis, no redness  Neck: supple, symmetrical, no swelling  Skin: No obvious rashes or lesions present. Skin and hair texture normal  Psychiatric: Judgement and Insight:  judgement and insight appear normal  Neuro: Normal without focal findings, speech is normal normal, speech is spontaneous  Chest: No labored breathing, no chest deformity, no stridor  Musculoskeletal: No joint deformity, swelling    Foot exam: No ulcer, monofilament detected    Lab Reviewed   No components found for: CHLPL  Lab Results   Component Value Date    TRIG 77 09/13/2022    TRIG 101 11/30/2021    TRIG 97 01/23/2019     Lab Results   Component Value Date    HDL 64 (H) 09/13/2022    HDL 62 (H) 11/30/2021    HDL 51 01/23/2019     Lab Results   Component Value Date    LDLCALC 108 (H) 09/13/2022    LDLCALC 110 (H) 11/30/2021    LDLCALC 156 (H) 01/23/2019     Lab Results   Component Value Date    LABVLDL 15 09/13/2022    LABVLDL 20 11/30/2021    LABVLDL 19 01/23/2019     Lab Results   Component Value Date    LABA1C 6.2 09/13/2022       Assessment:     Alva Milner is a 33 y.o. female with :    1.T2DM: Longstanding, controlled, insulin resistant. On high dose of insulin.  Discussed weight loss, diet changes, she is on GLP-1 agonist.  Increase prandial insulin. A1c better  Using CGM. May need U-500 but weight gain can be an issue   Control improved recently as more adherent, made diet changes  No plan for pregnancy. Discussed risk with uncontrolled DM  Continue SGLT-2 inhibitor  Reduce tresiba given occasional lows  2. HLD : LDL above target, recommend statin  3. Obesity: Discussed weight loss, advise 1500 Kcal diet. Discussed bariatric surgery, she is seeing Dr. Jorge Garza, on Qsymia  Switch trulicity to ozempic and assess weight loss  4. PCOS: On OCP  5. Vit D deficiency: Repeat improved    Plan:      Tresiba 140 units---> 132 units   Novolog  24   SSI 2 for 50 >108   Trulicity 3mg   Advise to check blood sugar 4 times a day   Patient to send blood sugar log for titration. Advise to exercise regularly. Advise to low simple carbohydrate and protein with each  meal diet. Diabetes Care: recommend yearly eye exam, foot exam and urine microalbumin to   creatinine ratio. Patient is up-to-date.

## 2022-10-17 NOTE — TELEPHONE ENCOUNTER
Ozempic PA: Message from Plan  Case XE:[id number];[id number]; Review Type:Prior Auth; Coverage Start Date:10/17/2022; Coverage End Date:10/17/2023;

## 2022-10-20 RX ORDER — INSULIN LISPRO 100 [IU]/ML
INJECTION, SOLUTION INTRAVENOUS; SUBCUTANEOUS
Qty: 20 ML | Refills: 2 | Status: SHIPPED | OUTPATIENT
Start: 2022-10-20

## 2022-10-20 NOTE — TELEPHONE ENCOUNTER
Medication:   Requested Prescriptions     Pending Prescriptions Disp Refills    insulin lispro, 1 Unit Dial, (HUMALOG/ADMELOG) 100 UNIT/ML SOPN [Pharmacy Med Name: INSULIN LISPRO 100 UNIT/ML PEN] 20 mL 2     Sig: INJECT 20 TO 24 UNITS UNDER THE SKIN BEFORE MEALS THREE TIMES A DAY       Last Filled:      Patient Phone Number: [phone] (home)     Last appt: 10/17/2022   Next appt: 1/24/2023    Last Labs DM:   Lab Results   Component Value Date/Time    LABA1C 6.2 09/13/2022 09:34 AM

## 2022-11-07 RX ORDER — BLOOD-GLUCOSE,RECEIVER,CONT
EACH MISCELLANEOUS
Qty: 1 EACH | Refills: 0 | Status: SHIPPED | OUTPATIENT
Start: 2022-11-07

## 2022-11-09 VITALS — WEIGHT: 293 LBS | BODY MASS INDEX: 73.47 KG/M2

## 2022-11-10 ENCOUNTER — TELEPHONE (OUTPATIENT)
Dept: ENDOCRINOLOGY | Age: 32
End: 2022-11-10

## 2022-11-10 ENCOUNTER — TELEMEDICINE (OUTPATIENT)
Dept: BARIATRICS/WEIGHT MGMT | Age: 32
End: 2022-11-10
Payer: COMMERCIAL

## 2022-11-10 ENCOUNTER — PATIENT MESSAGE (OUTPATIENT)
Dept: ENDOCRINOLOGY | Age: 32
End: 2022-11-10

## 2022-11-10 DIAGNOSIS — Z79.4 CONTROLLED TYPE 2 DIABETES MELLITUS WITH COMPLICATION, WITH LONG-TERM CURRENT USE OF INSULIN (HCC): Primary | ICD-10-CM

## 2022-11-10 DIAGNOSIS — Z71.3 DIETARY COUNSELING AND SURVEILLANCE: ICD-10-CM

## 2022-11-10 DIAGNOSIS — E11.8 CONTROLLED TYPE 2 DIABETES MELLITUS WITH COMPLICATION, WITH LONG-TERM CURRENT USE OF INSULIN (HCC): Primary | ICD-10-CM

## 2022-11-10 DIAGNOSIS — E66.01 MORBID OBESITY WITH BMI OF 70 AND OVER, ADULT (HCC): Primary | ICD-10-CM

## 2022-11-10 PROCEDURE — 99214 OFFICE O/P EST MOD 30 MIN: CPT | Performed by: FAMILY MEDICINE

## 2022-11-10 RX ORDER — SEMAGLUTIDE 1.34 MG/ML
INJECTION, SOLUTION SUBCUTANEOUS
Qty: 3 ML | Refills: 3 | Status: SHIPPED | OUTPATIENT
Start: 2022-11-10

## 2022-11-10 RX ORDER — SEMAGLUTIDE 1.34 MG/ML
INJECTION, SOLUTION SUBCUTANEOUS
Qty: 3 ML | Refills: 3 | Status: SHIPPED | OUTPATIENT
Start: 2022-11-10 | End: 2022-11-10 | Stop reason: SDUPTHER

## 2022-11-10 ASSESSMENT — ENCOUNTER SYMPTOMS
COUGH: 0
SHORTNESS OF BREATH: 0
VOMITING: 0
ABDOMINAL DISTENTION: 0
PHOTOPHOBIA: 0
WHEEZING: 0
DIARRHEA: 0
CHOKING: 0
NAUSEA: 0
APNEA: 0
EYE PAIN: 0
CONSTIPATION: 0
ABDOMINAL PAIN: 0
BLOOD IN STOOL: 0
CHEST TIGHTNESS: 0

## 2022-11-10 NOTE — TELEPHONE ENCOUNTER
Please advise. Was on 2 mg dose. I can check our sample fridge tomorrow but I do not think we have any Ozempic.

## 2022-11-10 NOTE — TELEPHONE ENCOUNTER
Pt states lorena has the 1mg dose of ozempic if he wants to prescribe that.       Oli Mittal [id number] [facility]  Phone: [phone]

## 2022-11-10 NOTE — PROGRESS NOTES
Patient: Jt Espinoza                      Encounter Date: 11/10/2022    YOB: 1990                Age: 33 y.o. Chief Complaint   Patient presents with    Weight Management     F/u MWM         Patient identification was verified at the start of the visit. Patient-Reported Vitals 11/9/2022   Patient-Reported Weight 408   Patient-Reported Height -   Patient-Reported Systolic -   Patient-Reported Diastolic -   Patient-Reported Pulse -   Patient-Reported Temperature -   Patient-Reported SpO2 -         BP Readings from Last 1 Encounters:   10/17/22 128/72       BMI Readings from Last 1 Encounters:   11/09/22 73.47 kg/m²       Pulse Readings from Last 1 Encounters:   10/17/22 78          Wt Readings from Last 3 Encounters:   11/09/22 (!) 408 lb 3.2 oz (185.2 kg)   10/17/22 (!) 406 lb (184.2 kg)   10/14/22 (!) 405 lb 9.6 oz (184 kg)      HPI: 28 y.o. female with a long-standing history of obesity presents today for virtual video follow-up. She has gained 4 pounds since her last visit. Current treatment includes Qsymia 15-92 mg daily. Hasn't been following the prescribed low carb/goyo meal plan. Stressed at work. Eating convenient foods/snaking. Planning on bariatric surgery once BMI <65      Medication(s): Appetite well controlled? [x]Yes      []No                          Focus:     []Good     []Fair     [x]Poor                          Side effects? No        Any recent change in medication(s)?  No     Exercise: []Cardio     []Resistance/strength training     [x]Other: Walking     No Known Allergies      Current Outpatient Medications:     Continuous Blood Gluc  (DEXCOM G6 ) JER[place], USE FOR CONTINUOUS BLOOD GLUCOSE MONITORING, Disp: 1 each, Rfl: 0    insulin lispro, 1 Unit Dial, (HUMALOG/ADMELOG) 100 UNIT/ML SOPN, INJECT 20 TO 24 UNITS UNDER THE SKIN BEFORE MEALS THREE TIMES A DAY, Disp: 20 mL, Rfl: 2    Semaglutide, 2 MG/DOSE, (OZEMPIC, 2 MG/DOSE,) 8 MG/3ML SOPN, 2mg SC weekly, Disp: 3 mL, Rfl: 3    lisinopril (PRINIVIL;ZESTRIL) 10 MG tablet, TAKE ONE TABLET BY MOUTH DAILY, Disp: 30 tablet, Rfl: 5    TRESIBA FLEXTOUCH 200 UNIT/ML SOPN, INJECT 140 UNITS UNDER THE SKIN DAILY, Disp: 24 mL, Rfl: 3    JARDIANCE 10 MG tablet, TAKE ONE TABLET BY MOUTH DAILY, Disp: 90 tablet, Rfl: 1    albuterol sulfate HFA (PROVENTIL;VENTOLIN;PROAIR) 108 (90 Base) MCG/ACT inhaler, Inhale 2 puffs into the lungs every 6 hours as needed for Wheezing, Disp: 1 each, Rfl: 1    Continuous Blood Gluc Transmit (DEXCOM G6 TRANSMITTER) MISC, For continuous glucose monitoring .   Diagnosis code E10.9, Disp: 1 each, Rfl: 3    busPIRone (BUSPAR) 7.5 MG tablet, Take 1 tablet by mouth 2 times daily, Disp: 60 tablet, Rfl: 5    escitalopram (LEXAPRO) 20 MG tablet, TAKE ONE TABLET BY MOUTH DAILY, Disp: 30 tablet, Rfl: 5    Norgestim-Eth Estrad Triphasic 0.18/0.215/0.25 MG-35 MCG TABS, Take 1 tablet by mouth daily, Disp: 84 tablet, Rfl: 3    vitamin D (ERGOCALCIFEROL) 1.25 MG (69719 UT) CAPS capsule, Take 1 capsule by mouth once a week, Disp: 8 capsule, Rfl: 0    montelukast (SINGULAIR) 10 MG tablet, Take 1 tablet by mouth nightly, Disp: 30 tablet, Rfl: 5    ondansetron (ZOFRAN) 8 MG tablet, Take 1 tablet by mouth every 8 hours as needed for Nausea, Disp: 24 tablet, Rfl: 1    cyclobenzaprine (FLEXERIL) 10 MG tablet, Take 1 po q 8 hours prn low back pain/ tension headaches., Disp: 45 tablet, Rfl: 2    Insulin Pen Needle (B-D UF III MINI PEN NEEDLES) 31G X 5 MM MISC, 4 times a day, Disp: 150 each, Rfl: 6    Patient Active Problem List   Diagnosis    Asthma    Polycystic ovarian disease    Obstructive sleep apnea    Pure hypercholesterolemia    Morbid obesity (HCC)    Allergic rhinitis    Acne    Diabetes mellitus (HCC)    PCOS (polycystic ovarian syndrome)    TAY (obstructive sleep apnea)    Obesity    Folic acid deficiency    Headache    Skin lesion    Major depressive disorder, single episode, mild (HCC)    Mass of subcutaneous tissue of back    Vitamin D deficiency    Depression with anxiety    Controlled type 2 diabetes mellitus with complication, with long-term current use of insulin ([address]    Morbid obesity with BMI of 70 and over, adult [facility])       Review of Systems   Constitutional:  Negative for fatigue. Eyes:  Negative for photophobia, pain and visual disturbance. Respiratory:  Negative for apnea, cough, choking, chest tightness, shortness of breath and wheezing. Cardiovascular:  Negative for chest pain, palpitations and leg swelling. Gastrointestinal:  Negative for abdominal distention, abdominal pain, blood in stool, constipation, diarrhea, nausea and vomiting. Endocrine: Negative for cold intolerance and heat intolerance. Musculoskeletal:  Negative for arthralgias and myalgias. Skin:  Negative for rash. Neurological:  Negative for dizziness, tremors, syncope, weakness, numbness and headaches. Psychiatric/Behavioral:  Negative for agitation, confusion, decreased concentration, dysphoric mood, hallucinations, sleep disturbance and suicidal ideas. The patient is not nervous/anxious and is not hyperactive. Physical Exam  Constitutional:       Appearance: She is well-developed. HENT:      Head: Normocephalic. Eyes:      Conjunctiva/sclera: Conjunctivae normal.   Abdominal:      General: Abdomen is protuberant. Musculoskeletal:         General: No swelling. Neurological:      Mental Status: She is alert and oriented to person, place, and time. Psychiatric:         Mood and Affect: Mood normal.         Behavior: Behavior normal.         Thought Content: Thought content normal.         Judgment: Judgment normal.       Orders Only on 09/13/2022   Component Date Value Ref Range Status    Vit D, 25-Hydroxy 09/13/2022 21.7 (A)  >=30 ng/mL Final    Comment: <=20 ng/mL. ........... Da Alyce Deficient  21-29 ng/mL. ......... Da Alyce Insufficient  >=30 ng/mL. ........ Da Alyce Sufficient      Vitamin B-12 09/13/2022 557  211 - 911 pg/mL Final    Folate 09/13/2022 3.67 (A)  4.78 - 24.20 ng/mL Final    Comment: Effective 11-15-16 10:00am EST  Please note reference ranges have  changed for Folate. TSH 09/13/2022 2.78  0.27 - 4.20 uIU/mL Final    Cholesterol, Total 09/13/2022 187  0 - 199 mg/dL Final    Triglycerides 09/13/2022 77  0 - 150 mg/dL Final    HDL 09/13/2022 64 (A)  40 - 60 mg/dL Final    Comment: An HDL cholesterol less than 40 mg/dL is low and  constitutes a coronary heart disease risk factor. An HDL cholesterol greater than 60 mg/dL is a  negative risk factor for coronary heart disease. LDL Calculated 09/13/2022 108 (A)  <100 mg/dL Final    VLDL Cholesterol Calculated 09/13/2022 15  Not Established mg/dL Final    Iron 09/13/2022 46  37 - 145 ug/dL Final    TIBC 09/13/2022 364  260 - 445 ug/dL Final    Iron Saturation 09/13/2022 13 (A)  15 - 50 % Final    Hemoglobin A1C 09/13/2022 6.2  See comment % Final    Comment: Comment:  Diagnosis of Diabetes: > or = 6.5%  Increased risk of diabetes (Prediabetes): 5.7-6.4%  Glycemic Control: Nonpregnant Adults: <7.0%                    Pregnant: <6.0%        eAG 09/13/2022 131.2  mg/dL Final    Sodium 09/13/2022 142  136 - 145 mmol/L Final    Potassium 09/13/2022 4.3  3.5 - 5.1 mmol/L Final    Chloride 09/13/2022 103  99 - 110 mmol/L Final    CO2 09/13/2022 25  21 - 32 mmol/L Final    Anion Gap 09/13/2022 14  3 - 16 Final    Glucose 09/13/2022 77  70 - 99 mg/dL Final    BUN 09/13/2022 14  7 - 20 mg/dL Final    Creatinine 09/13/2022 <0.5 (A)  0.6 - 1.1 mg/dL Final    GFR Non- 09/13/2022 >60  >60 Final    Comment: >60 mL/min/1.73m2 EGFR, calc. for ages 25 and older using the  MDRD formula (not corrected for weight), is valid for stable  renal function. GFR  09/13/2022 >60  >60 Final    Comment: Chronic Kidney Disease: less than 60 ml/min/1.73 sq.m. Kidney Failure: less than 15 ml/min/1.73 sq.m. Results valid for patients 18 years and older. Calcium 09/13/2022 9.0  8.3 - 10.6 mg/dL Final    Total Protein 09/13/2022 6.9  6.4 - 8.2 g/dL Final    Albumin 09/13/2022 4.3  3.4 - 5.0 g/dL Final    Albumin/Globulin Ratio 09/13/2022 1.7  1.1 - 2.2 Final    Total Bilirubin 09/13/2022 0.3  0.0 - 1.0 mg/dL Final    Alkaline Phosphatase 09/13/2022 90  40 - 129 U/L Final    ALT 09/13/2022 17  10 - 40 U/L Final    AST 09/13/2022 13 (A)  15 - 37 U/L Final    WBC 09/13/2022 11.0  4.0 - 11.0 K/uL Final    RBC 09/13/2022 4.93  4.00 - 5.20 M/uL Final    Hemoglobin 09/13/2022 13.2  12.0 - 16.0 g/dL Final    Hematocrit 09/13/2022 40.0  36.0 - 48.0 % Final    MCV 09/13/2022 81.1  80.0 - 100.0 fL Final    MCH 09/13/2022 26.8  26.0 - 34.0 pg Final    MCHC 09/13/2022 33.0  31.0 - 36.0 g/dL Final    RDW 09/13/2022 16.4 (A)  12.4 - 15.4 % Final    Platelets 58/80/1042 365  135 - 450 K/uL Final    MPV 09/13/2022 7.2  5.0 - 10.5 fL Final    Neutrophils % 09/13/2022 71.4  % Final    Lymphocytes % 09/13/2022 22.8  % Final    Monocytes % 09/13/2022 4.6  % Final    Eosinophils % 09/13/2022 0.8  % Final    Basophils % 09/13/2022 0.4  % Final    Neutrophils Absolute 09/13/2022 7.8 (A)  1.7 - 7.7 K/uL Final    Lymphocytes Absolute 09/13/2022 2.5  1.0 - 5.1 K/uL Final    Monocytes Absolute 09/13/2022 0.5  0.0 - 1.3 K/uL Final    Eosinophils Absolute 09/13/2022 0.1  0.0 - 0.6 K/uL Final    Basophils Absolute 09/13/2022 0.0  0.0 - 0.2 K/uL Final         Assessment and Plan:  1. Morbid obesity with BMI of 70 and over, adult ([address]  Gaining weight. Not following the prescribed meal plan. Reviewed general low carb/garrett guidelines. Avoid trigger foods. Increase exercise/physical activity. Continue Qsymia- refill provided. F/u 4 weeks. In-office weight check before next visit. 2. Dietary counseling and surveillance  1500-Garrett/low carb meal plan. Avoid evening/nighttime snacking. Use distraction techniques to avoid boredom/stress eating. Plan/prep meals ahead of time.    Avoid soda/juice/sugary drinks. Be mindful of portion sizes. Nutrition Plan: [] LCHF/Ketogenic   [x] Modified low-calorie diet (low carb/low-goyo)               [] Low-calorie diet    [] Maintenance       []Other        Exercise: [] Cardio     [] Resistance/strength training                       [x] ACSM recommendations (150 minutes/week in active weight loss)               Behavior: [x] Motivational interviewing performed    [] Referral for counseling                         [x] Discussed strategies to overcome habits/challenges for focus         [x] Stress management   [x] Stimulus control         [] Sleep hygiene      Reviewed:  [x] Nutrition and the importance of regular protein intake  [x] Hidden carbohydrate sources  [x] Alcohol use  [x] Tobacco use   [x] Drug use- Denies  [x] Importance of exercise and reducing sedentary time  [x] Treatment consent- Patient understands and agrees with the treatment plan   [x] Proper use of medication and side effects  [x] OARRS report      Controlled Substance Monitoring:    RX Monitoring 11/15/2017   Attestation The Prescription Monitoring Report for this patient was reviewed today. Periodic Controlled Substance Monitoring Possible medication side effects, risk of tolerance and/or dependence, and alternative treatments discussed. ;No signs of potential drug abuse or diversion identified. Patient denies any history of cardiovascular disease (e.g., CAD, stroke, arrhythmias, CHF, uncontrolled HTN), seizure disorder, MAOI use within the last 2 weeks, hyperthyroidism, glaucoma, agitated states, history of drug abuse, pregnancy, nursing, known hypersensitivity to the prescribing meds, history of pancreatitis, or personal or family history of thyroid medullary cancer.       New start date: 9/9/22  12 weeks: 12/9/22  Starting weight: 402 pounds    Goal: At least 5% (20 pounds)   Total weight loss: + 6 pounds         Key dietary points:    - Meats (preferably organic or grass fed) are great sources of protein and have no carbohydrates. - Recommend coconut, olive, avocado, or almond oils. - When buying dairy, choose regular or full fat options. - Choose vegetables that grow above ground as they are generally lower in carbohydrates and higher in fiber.  - Avoid starches such as bread, rice, potatoes, pasta and all sources of simple sugars (desserts, soda, breakfast cereals). - Choose beverages that are calorie and sugar free. Reminder regarding weight loss medications: You must be seen in office every 2-4 weeks to haveyour prescriptions refilled. If you are off of your medication for longer than 7 days, you will not be able to restart the medication for at least 6 months. Always call our office to report any side effects. Females, it is your responsibility to obtain negative pregnancy tests each month. No orders of the defined types were placed in this encounter. No follow-ups on file. Mari Morales is a 33 y.o. female being evaluated by a Virtual Visit (video visit) encounter to address concerns as mentioned above. A caregiver was present when appropriate. Due to this being a TeleHealth encounter (During XCN-50 public health emergency), evaluation of the following organ systems was limited: Vitals/Constitutional/EENT/Resp/CV/GI//MS/Neuro/Skin/Heme-Lymph-Imm. Pursuant to the emergency declaration under the [facility], [address] [place] [address] authority and the [employer] General Act, this Virtual Visit was conducted with patient's (and/or legal guardian's) consent, to reduce the patient's risk of exposure to COVID-19 and provide necessary medical care. The patient (and/or legal guardian) has also been advised to contact this office for worsening conditions or problems, and seek emergency medical treatment and/or call 911 if deemed necessary.

## 2022-11-11 ENCOUNTER — TELEPHONE (OUTPATIENT)
Dept: ENDOCRINOLOGY | Age: 32
End: 2022-11-11

## 2022-11-11 NOTE — TELEPHONE ENCOUNTER
(Key: [id number])  Rx #: [id number]  Message from Plan  Case [id number];KGAJJA:[id number]; Review Type:Prior Auth; Coverage Start Date:11/11/2022; Coverage End Date:11/11/2023;

## 2022-12-09 ENCOUNTER — TELEMEDICINE (OUTPATIENT)
Dept: BARIATRICS/WEIGHT MGMT | Age: 32
End: 2022-12-09
Payer: COMMERCIAL

## 2022-12-09 VITALS — WEIGHT: 293 LBS | BODY MASS INDEX: 51.91 KG/M2 | HEIGHT: 63 IN

## 2022-12-09 DIAGNOSIS — Z71.3 DIETARY COUNSELING AND SURVEILLANCE: ICD-10-CM

## 2022-12-09 DIAGNOSIS — E66.01 MORBID OBESITY WITH BMI OF 70 AND OVER, ADULT (HCC): Primary | ICD-10-CM

## 2022-12-09 PROCEDURE — 99214 OFFICE O/P EST MOD 30 MIN: CPT | Performed by: FAMILY MEDICINE

## 2022-12-09 ASSESSMENT — ENCOUNTER SYMPTOMS
EYE PAIN: 0
APNEA: 0
NAUSEA: 0
WHEEZING: 0
CONSTIPATION: 0
VOMITING: 0
DIARRHEA: 0
BLOOD IN STOOL: 0
CHEST TIGHTNESS: 0
SHORTNESS OF BREATH: 0
CHOKING: 0
COUGH: 0
ABDOMINAL PAIN: 0
ABDOMINAL DISTENTION: 0
PHOTOPHOBIA: 0

## 2022-12-09 NOTE — PROGRESS NOTES
Patient: Kevin Christensen                      Encounter Date: 12/9/2022    YOB: 1990                Age: 33 y.o. Chief Complaint   Patient presents with    Weight Management     F/u MWM             Patient identification was verified at the start of the visit. Patient-Reported Vitals 12/9/2022   Patient-Reported Weight -   Patient-Reported Height 54   Patient-Reported Systolic -   Patient-Reported Diastolic -   Patient-Reported Pulse -   Patient-Reported Temperature -   Patient-Reported SpO2 -         BP Readings from Last 1 Encounters:   10/17/22 128/72       BMI Readings from Last 1 Encounters:   12/09/22 74.01 kg/m²       Pulse Readings from Last 1 Encounters:   10/17/22 78          Wt Readings from Last 3 Encounters:   12/09/22 (!) 411 lb 3.2 oz (186.5 kg)   11/09/22 (!) 408 lb 3.2 oz (185.2 kg)   10/17/22 (!) 406 lb (184.2 kg)        HPI: 33 y.o. female with a long-standing history of obesity presents today for virtual video follow-up. she has gained 3 pounds since her last visit. Current treatment includes Qsymia 15-92 mg daily. Not following low carb/goyo meal plan as prescribed. Medication(s): Appetite well controlled? []Yes      [x]No    Focus:     []Good     []Fair     [x]Poor    Side effects? No        Any recent change in medication(s)? No    Exercise: []Cardio     []Resistance/strength training     [x]Other: None     No Known Allergies      Current Outpatient Medications:     Phentermine-Topiramate 15-92 MG CP24, Take 1 capsule by mouth daily for 30 days. , Disp: 30 capsule, Rfl: 0    Semaglutide, 1 MG/DOSE, (OZEMPIC, 1 MG/DOSE,) 2 MG/1.5ML SOPN, 1mg SC weekly, Disp: 3 mL, Rfl: 3    Continuous Blood Gluc  (DEXCOM G6 ) [place], USE FOR CONTINUOUS BLOOD GLUCOSE MONITORING, Disp: 1 each, Rfl: 0    insulin lispro, 1 Unit Dial, (HUMALOG/ADMELOG) 100 UNIT/ML SOPN, INJECT 20 TO 24 UNITS UNDER THE SKIN BEFORE MEALS THREE TIMES A DAY, Disp: 20 mL, Rfl: 2    Semaglutide, 2 MG/DOSE, (OZEMPIC, 2 MG/DOSE,) 8 MG/3ML SOPN, 2mg SC weekly, Disp: 3 mL, Rfl: 3    lisinopril (PRINIVIL;ZESTRIL) 10 MG tablet, TAKE ONE TABLET BY MOUTH DAILY, Disp: 30 tablet, Rfl: 5    TRESIBA FLEXTOUCH 200 UNIT/ML SOPN, INJECT 140 UNITS UNDER THE SKIN DAILY, Disp: 24 mL, Rfl: 3    JARDIANCE 10 MG tablet, TAKE ONE TABLET BY MOUTH DAILY, Disp: 90 tablet, Rfl: 1    albuterol sulfate HFA (PROVENTIL;VENTOLIN;PROAIR) 108 (90 Base) MCG/ACT inhaler, Inhale 2 puffs into the lungs every 6 hours as needed for Wheezing, Disp: 1 each, Rfl: 1    Continuous Blood Gluc Transmit (DEXCOM G6 TRANSMITTER) MISC, For continuous glucose monitoring .   Diagnosis code E10.9, Disp: 1 each, Rfl: 3    busPIRone (BUSPAR) 7.5 MG tablet, Take 1 tablet by mouth 2 times daily, Disp: 60 tablet, Rfl: 5    escitalopram (LEXAPRO) 20 MG tablet, TAKE ONE TABLET BY MOUTH DAILY, Disp: 30 tablet, Rfl: 5    Norgestim-Eth Estrad Triphasic 0.18/0.215/0.25 MG-35 MCG TABS, Take 1 tablet by mouth daily, Disp: 84 tablet, Rfl: 3    vitamin D (ERGOCALCIFEROL) 1.25 MG (97080 UT) CAPS capsule, Take 1 capsule by mouth once a week, Disp: 8 capsule, Rfl: 0    montelukast (SINGULAIR) 10 MG tablet, Take 1 tablet by mouth nightly, Disp: 30 tablet, Rfl: 5    ondansetron (ZOFRAN) 8 MG tablet, Take 1 tablet by mouth every 8 hours as needed for Nausea, Disp: 24 tablet, Rfl: 1    cyclobenzaprine (FLEXERIL) 10 MG tablet, Take 1 po q 8 hours prn low back pain/ tension headaches., Disp: 45 tablet, Rfl: 2    Insulin Pen Needle (B-D UF III MINI PEN NEEDLES) 31G X 5 MM MISC, 4 times a day, Disp: 150 each, Rfl: 6    Patient Active Problem List   Diagnosis    Asthma    Polycystic ovarian disease    Obstructive sleep apnea    Pure hypercholesterolemia    Morbid obesity (HCC)    Allergic rhinitis    Acne    Diabetes mellitus (HCC)    PCOS (polycystic ovarian syndrome)    TAY (obstructive sleep apnea)    Obesity    Folic acid deficiency    Headache    Skin lesion    Major depressive disorder, single episode, mild (HCC)    Mass of subcutaneous tissue of back    Vitamin D deficiency    Depression with anxiety    Controlled type 2 diabetes mellitus with complication, with long-term current use of insulin ([address]    Morbid obesity with BMI of 70 and over, adult [facility])       Review of Systems   Constitutional:  Negative for fatigue. Eyes:  Negative for photophobia, pain and visual disturbance. Respiratory:  Negative for apnea, cough, choking, chest tightness, shortness of breath and wheezing. Cardiovascular:  Negative for chest pain, palpitations and leg swelling. Gastrointestinal:  Negative for abdominal distention, abdominal pain, blood in stool, constipation, diarrhea, nausea and vomiting. Endocrine: Negative for cold intolerance and heat intolerance. Musculoskeletal:  Negative for arthralgias and myalgias. Skin:  Negative for rash. Neurological:  Negative for dizziness, tremors, syncope, weakness, numbness and headaches. Psychiatric/Behavioral:  Negative for agitation, confusion, decreased concentration, dysphoric mood, hallucinations, sleep disturbance and suicidal ideas. The patient is not nervous/anxious and is not hyperactive. Physical Exam  Constitutional:       Appearance: She is well-developed. HENT:      Head: Normocephalic. Eyes:      Conjunctiva/sclera: Conjunctivae normal.   Abdominal:      General: Abdomen is protuberant. Musculoskeletal:         General: No swelling. Neurological:      Mental Status: She is alert and oriented to person, place, and time. Psychiatric:         Mood and Affect: Mood normal.         Behavior: Behavior normal.         Thought Content: Thought content normal.         Judgment: Judgment normal.       Orders Only on 09/13/2022   Component Date Value Ref Range Status    Vit D, 25-Hydroxy 09/13/2022 21.7 (A)  >=30 ng/mL Final    Comment: <=20 ng/mL. ........... Kalee Rodriguez Deficient  21-29 ng/mL. ......... Kalee Rodriguez Insufficient  >=30 ng/mL......... Mikel Jesusn Sufficient      Vitamin B-12 09/13/2022 557  211 - 911 pg/mL Final    Folate 09/13/2022 3.67 (A)  4.78 - 24.20 ng/mL Final    Comment: Effective 11-15-16 10:00am EST  Please note reference ranges have  changed for Folate. TSH 09/13/2022 2.78  0.27 - 4.20 uIU/mL Final    Cholesterol, Total 09/13/2022 187  0 - 199 mg/dL Final    Triglycerides 09/13/2022 77  0 - 150 mg/dL Final    HDL 09/13/2022 64 (A)  40 - 60 mg/dL Final    Comment: An HDL cholesterol less than 40 mg/dL is low and  constitutes a coronary heart disease risk factor. An HDL cholesterol greater than 60 mg/dL is a  negative risk factor for coronary heart disease. LDL Calculated 09/13/2022 108 (A)  <100 mg/dL Final    VLDL Cholesterol Calculated 09/13/2022 15  Not Established mg/dL Final    Iron 09/13/2022 46  37 - 145 ug/dL Final    TIBC 09/13/2022 364  260 - 445 ug/dL Final    Iron Saturation 09/13/2022 13 (A)  15 - 50 % Final    Hemoglobin A1C 09/13/2022 6.2  See comment % Final    Comment: Comment:  Diagnosis of Diabetes: > or = 6.5%  Increased risk of diabetes (Prediabetes): 5.7-6.4%  Glycemic Control: Nonpregnant Adults: <7.0%                    Pregnant: <6.0%        eAG 09/13/2022 131.2  mg/dL Final    Sodium 09/13/2022 142  136 - 145 mmol/L Final    Potassium 09/13/2022 4.3  3.5 - 5.1 mmol/L Final    Chloride 09/13/2022 103  99 - 110 mmol/L Final    CO2 09/13/2022 25  21 - 32 mmol/L Final    Anion Gap 09/13/2022 14  3 - 16 Final    Glucose 09/13/2022 77  70 - 99 mg/dL Final    BUN 09/13/2022 14  7 - 20 mg/dL Final    Creatinine 09/13/2022 <0.5 (A)  0.6 - 1.1 mg/dL Final    GFR Non- 09/13/2022 >60  >60 Final    Comment: >60 mL/min/1.73m2 EGFR, calc. for ages 25 and older using the  MDRD formula (not corrected for weight), is valid for stable  renal function. GFR  09/13/2022 >60  >60 Final    Comment: Chronic Kidney Disease: less than 60 ml/min/1.73 sq.m.           Kidney Failure: less than 15 ml/min/1.73 sq.m. Results valid for patients 18 years and older. Calcium 09/13/2022 9.0  8.3 - 10.6 mg/dL Final    Total Protein 09/13/2022 6.9  6.4 - 8.2 g/dL Final    Albumin 09/13/2022 4.3  3.4 - 5.0 g/dL Final    Albumin/Globulin Ratio 09/13/2022 1.7  1.1 - 2.2 Final    Total Bilirubin 09/13/2022 0.3  0.0 - 1.0 mg/dL Final    Alkaline Phosphatase 09/13/2022 90  40 - 129 U/L Final    ALT 09/13/2022 17  10 - 40 U/L Final    AST 09/13/2022 13 (A)  15 - 37 U/L Final    WBC 09/13/2022 11.0  4.0 - 11.0 K/uL Final    RBC 09/13/2022 4.93  4.00 - 5.20 M/uL Final    Hemoglobin 09/13/2022 13.2  12.0 - 16.0 g/dL Final    Hematocrit 09/13/2022 40.0  36.0 - 48.0 % Final    MCV 09/13/2022 81.1  80.0 - 100.0 fL Final    MCH 09/13/2022 26.8  26.0 - 34.0 pg Final    MCHC 09/13/2022 33.0  31.0 - 36.0 g/dL Final    RDW 09/13/2022 16.4 (A)  12.4 - 15.4 % Final    Platelets 13/97/6309 365  135 - 450 K/uL Final    MPV 09/13/2022 7.2  5.0 - 10.5 fL Final    Neutrophils % 09/13/2022 71.4  % Final    Lymphocytes % 09/13/2022 22.8  % Final    Monocytes % 09/13/2022 4.6  % Final    Eosinophils % 09/13/2022 0.8  % Final    Basophils % 09/13/2022 0.4  % Final    Neutrophils Absolute 09/13/2022 7.8 (A)  1.7 - 7.7 K/uL Final    Lymphocytes Absolute 09/13/2022 2.5  1.0 - 5.1 K/uL Final    Monocytes Absolute 09/13/2022 0.5  0.0 - 1.3 K/uL Final    Eosinophils Absolute 09/13/2022 0.1  0.0 - 0.6 K/uL Final    Basophils Absolute 09/13/2022 0.0  0.0 - 0.2 K/uL Final         Assessment and Plan:  1. Morbid obesity with BMI of 70 and over, adult ([facility] [address]  Gaining weight. Start tapering off Qsymia as instructed (take every other day until next visit) due to poor response. Encouraged low carb/goyo meal plan as prescribed. Will increase Ozempic to 2 mg weekly. F/u 4 weeks. May benefit from modified Optifast plan. In-office weight check before next visit. 2. Dietary counseling and surveillance  [id number]/low carb meal plan.    Avoid skipping meals. Avoid evening/nighttime snacking. Use distraction techniques to avoid boredom/stress eating. Plan/prep meals ahead of time. Avoid soda/juice/sugary drinks. Be mindful of portion sizes. Nutrition Plan: [] LCHF/Ketogenic   [x] Modified low-calorie diet (low carb/low-goyo)               [] Low-calorie diet    [] Maintenance       []Other        Exercise: [x] Cardio     [x] Resistance/strength training                       [x] ACSM recommendations (150 minutes/week in active weight loss)               Behavior: [x] Motivational interviewing performed    [] Referral for counseling                         [x] Discussed strategies to overcome habits/challenges for focus         [] Stress management   [x] Stimulus control         [] Sleep hygiene      Reviewed:  [x] Nutrition and the importance of regular protein intake  [x] Hidden carbohydrate sources  [x] Alcohol use  [x] Tobacco use   [x] Drug use- Denies  [x] Importance of exercise and reducing sedentary time  [x] Treatment consent- Patient understands and agrees with the treatment plan   [x] Proper use of medication and side effects  [x] OARRS report      Controlled Substance Monitoring:    RX Monitoring 11/15/2017   Attestation The Prescription Monitoring Report for this patient was reviewed today. Periodic Controlled Substance Monitoring Possible medication side effects, risk of tolerance and/or dependence, and alternative treatments discussed. ;No signs of potential drug abuse or diversion identified. New start date: 9/9/22  12 weeks: 12/9/22  Starting weight: 402 pounds    Goal: At least 5% (20 pounds)   Total weight loss: + 9 pounds         Key dietary points:    - Meats (preferably organic or grass fed) are great sources of protein and have no carbohydrates. - Recommend coconut, olive, avocado, or almond oils. - When buying dairy, choose regular or full fat options.   - Choose vegetables that grow above ground as they are generally lower in carbohydrates and higher in fiber.  - Avoid starches such as bread, rice, potatoes, pasta and all sources of simple sugars (desserts, soda, breakfast cereals). - Choose beverages that are calorie and sugar free. Reminder regarding weight loss medications: You must be seen in office every 2-4 weeks to haveyour prescriptions refilled. If you are off of your medication for longer than 7 days, you will not be able to restart the medication for at least 6 months. Always call our office to report any side effects. Females, it is your responsibility to obtain negative pregnancy tests each month. No orders of the defined types were placed in this encounter. No follow-ups on file. Amol Garrett is a 33 y.o. female being evaluated by a Virtual Visit (video visit) encounter to address concerns as mentioned above. A caregiver was present when appropriate. Due to this being a TeleHealth encounter (During [facility] public health emergency), evaluation of the following organ systems was limited: Vitals/Constitutional/EENT/Resp/CV/GI//MS/Neuro/Skin/Heme-Lymph-Imm. Pursuant to the emergency declaration under the [facility], [address] [place] [address] authority and the [employer] General Act, this Virtual Visit was conducted with patient's (and/or legal guardian's) consent, to reduce the patient's risk of exposure to COVID-19 and provide necessary medical care. The patient (and/or legal guardian) has also been advised to contact this office for worsening conditions or problems, and seek emergency medical treatment and/or call 911 if deemed necessary.

## 2022-12-12 ENCOUNTER — TELEMEDICINE (OUTPATIENT)
Dept: PSYCHOLOGY | Age: 32
End: 2022-12-12

## 2022-12-12 DIAGNOSIS — F33.0 MILD EPISODE OF RECURRENT MAJOR DEPRESSIVE DISORDER (HCC): ICD-10-CM

## 2022-12-12 DIAGNOSIS — F41.9 ANXIETY: Primary | ICD-10-CM

## 2022-12-12 NOTE — PROGRESS NOTES
Behavioral Health Consultation  Radha Blair Psy.D. Psychologist  12/12/2022  4:10-4:35 PM EDT      Time spent with Patient: 25 minutes  This is patient's sixth [facility] appointment for this episode of care. Reason for Consult: Depression, anxiety  Referring Provider: MD Nirav Llanos [email or web address] [place] [address] -- Audio Only (During ORXTB-93 public health emergency)    Carter George was evaluated through a synchronous (real-time) audio encounter using HIPAA-compliant technology. The patient (or guardian, if applicable) is aware that this is a billable service, which includes applicable co-pays. This Virtual Visit was conducted with patient's (and/or legal guardian's) consent. The visit was conducted pursuant to the emergency declaration under the [address] [place] [address], [address] [place] [address] authority and the [employer] General Act. Patient identification was verified, and a caregiver was present when appropriate. The patient was located in a state where the provider was licensed to provide care. Consent:  She and/or health care decision maker is aware that that she may receive a bill for this telephone service, depending on her insurance coverage, and has provided verbal consent to proceed: Yes    Conducted a risk-benefit analysis and determined that the patient's presenting problems are consistent with the use of telepsychology. Determined that the patient has sufficient knowledge and skills in the use of technology enabling them to adequately benefit from telepsychology. It was determined that this patient was able to be properly treated without an in-person session. Patient verified current location at the beginning of the visit. Verified the following information:  Patient's identification: Yes  Patient location: Work @ [place] [address].  [address]  Patient's call back number: [phone]  Patient's emergency contact's name and number, as well as permission to contact them if needed:   Extended Emergency Contact Information  Primary Emergency Contact: Lupe Vides  Address: [address], [address] Phone: [phone]  Mobile Phone: [phone]  Relation: Parent  Secondary Emergency Contact: Edith Rodriguez  Address: [place], [address] Phone: [phone]  Relation: Parent    Provider location: [place], [address] [place] [address] affirm this is an episode with an Established Patient who has not had a related appointment within my department in the past 7 days or scheduled within the next 24 hours. S:  Pt has been struggling more lately. She is aware that she is putting too much pressure on herself. She has been leaving work by [employer] every day, but otherwise she has been overwhelmed at work and not focused enough on self-care. She is feeling burnt out, needs a break. No SI, plan or intent.       O:  Interventions:  -Supportive techniques  -Processed experiences / stressors / concerns  -Reinforced efforts towards self-care  -Explored sources of burnout and strategies to move through it (e.g., walking during the workday)      A:  MSE:  Appearance: Not assessed  Attitude: cooperative and friendly  Consciousness: alert  Orientation: oriented to person, place, time, general circumstance  Memory: recent and remote memory intact  Attention/Concentration: intact during session  Psychomotor Activity: Not assessed  Eye Contact: Not assessed  Speech: normal rate and volume, well-articulated  Mood: dysphoric, anxious  Affect: congruent  Perception: within normal limits  Thought Content: within normal limits  Thought Process: logical, coherent and goal-directed  Insight: good  Judgment: intact  Ability to understand instructions: Yes  Ability to respond meaningfully: Yes  Morbid Ideation: no Suicide Assessment: no suicidal ideation, plan, or intent. Appropriate for outpatient / telehealth care at this time. Homicidal Ideation: no      PHQ Scores 10/14/2022 10/14/2022 7/14/2022 7/14/2022 1/24/2022 11/12/2018 9/25/2017   PHQ2 Score 4 0 3 0 4 2 0   PHQ9 Score 10 0 6 0 19 2 0     Interpretation of Total Score Depression Severity: 1-4 = Minimal depression, 5-9 = Mild depression, 10-14 = Moderate depression, 15-19 = Moderately severe depression, 20-27 = Severe depression    Diagnosis:    1. Anxiety    2. Mild episode of recurrent major depressive disorder [facility])          Patient Active Problem List   Diagnosis    Asthma    Polycystic ovarian disease    Obstructive sleep apnea    Pure hypercholesterolemia    Morbid obesity ([facility] [address]    Allergic rhinitis    Acne    Diabetes mellitus (HCC)    PCOS (polycystic ovarian syndrome)    TAY (obstructive sleep apnea)    Obesity    Folic acid deficiency    Headache    Skin lesion    Major depressive disorder, single episode, mild (HCC)    Mass of subcutaneous tissue of back    Vitamin D deficiency    Depression with anxiety    Controlled type 2 diabetes mellitus with complication, with long-term current use of insulin ([facility] [address]    Morbid obesity with BMI of 70 and over, adult [facility])         Plan:  Pt interventions:  Supportive techniques, Emphasized self-care as important for managing overall health, Cognitive strategies to target balanced thinking, and Discussed and set plan for behavioral activation. Pt Behavioral Change Plan:  Pt set the following goals:  1. Return for a F/U virtual visit.

## 2022-12-20 ENCOUNTER — TELEPHONE (OUTPATIENT)
Dept: PULMONOLOGY | Age: 32
End: 2022-12-20

## 2022-12-31 ENCOUNTER — E-VISIT (OUTPATIENT)
Dept: PRIMARY CARE CLINIC | Age: 32
End: 2022-12-31
Payer: COMMERCIAL

## 2022-12-31 DIAGNOSIS — J06.9 UPPER RESPIRATORY TRACT INFECTION, UNSPECIFIED TYPE: Primary | ICD-10-CM

## 2022-12-31 PROCEDURE — 99422 OL DIG E/M SVC 11-20 MIN: CPT | Performed by: NURSE PRACTITIONER

## 2022-12-31 RX ORDER — AMOXICILLIN AND CLAVULANATE POTASSIUM 875; 125 MG/1; MG/1
1 TABLET, FILM COATED ORAL 2 TIMES DAILY
Qty: 20 TABLET | Refills: 0 | Status: SHIPPED | OUTPATIENT
Start: 2022-12-31 | End: 2023-01-10

## 2022-12-31 ASSESSMENT — LIFESTYLE VARIABLES: SMOKING_STATUS: NO, I'VE NEVER SMOKED

## 2022-12-31 NOTE — PROGRESS NOTES
Earna Shoulders (1990) initiated an asynchronous digital communication through [employer]. HPI: per patient questionnaire     Exam: not applicable    Diagnoses and all orders for this visit:  Diagnoses and all orders for this visit:    Upper respiratory tract infection, unspecified type    Other orders  -     amoxicillin-clavulanate (AUGMENTIN) 875-125 MG per tablet; Take 1 tablet by mouth 2 times daily for 10 days    Recommend covid testing  Supportive care measures  Follow up with pcp     Time: EV2 - 11-20 minutes were spent on the digital evaluation and management of this patient.  15 min     DARYL Mullins - CNP

## 2023-01-02 RX ORDER — ESCITALOPRAM OXALATE 20 MG/1
TABLET ORAL
Qty: 30 TABLET | Refills: 5 | Status: SHIPPED | OUTPATIENT
Start: 2023-01-02

## 2023-01-24 ENCOUNTER — OFFICE VISIT (OUTPATIENT)
Dept: ENDOCRINOLOGY | Age: 33
End: 2023-01-24
Payer: COMMERCIAL

## 2023-01-24 VITALS
BODY MASS INDEX: 51.91 KG/M2 | WEIGHT: 293 LBS | HEIGHT: 63 IN | TEMPERATURE: 98 F | SYSTOLIC BLOOD PRESSURE: 136 MMHG | RESPIRATION RATE: 14 BRPM | DIASTOLIC BLOOD PRESSURE: 84 MMHG | HEART RATE: 100 BPM

## 2023-01-24 DIAGNOSIS — E11.8 CONTROLLED TYPE 2 DIABETES MELLITUS WITH COMPLICATION, WITH LONG-TERM CURRENT USE OF INSULIN (HCC): Primary | ICD-10-CM

## 2023-01-24 DIAGNOSIS — Z79.4 CONTROLLED TYPE 2 DIABETES MELLITUS WITH COMPLICATION, WITH LONG-TERM CURRENT USE OF INSULIN (HCC): Primary | ICD-10-CM

## 2023-01-24 PROCEDURE — 95251 CONT GLUC MNTR ANALYSIS I&R: CPT | Performed by: INTERNAL MEDICINE

## 2023-01-24 PROCEDURE — 83036 HEMOGLOBIN GLYCOSYLATED A1C: CPT | Performed by: INTERNAL MEDICINE

## 2023-01-24 PROCEDURE — 99214 OFFICE O/P EST MOD 30 MIN: CPT | Performed by: INTERNAL MEDICINE

## 2023-01-24 RX ORDER — INSULIN DEGLUDEC 200 U/ML
INJECTION, SOLUTION SUBCUTANEOUS
Qty: 12 ADJUSTABLE DOSE PRE-FILLED PEN SYRINGE | Refills: 3 | Status: SHIPPED | OUTPATIENT
Start: 2023-01-24

## 2023-01-24 RX ORDER — SEMAGLUTIDE 1.34 MG/ML
INJECTION, SOLUTION SUBCUTANEOUS
Qty: 6 ML | Refills: 3 | Status: SHIPPED | OUTPATIENT
Start: 2023-01-24 | End: 2023-01-26

## 2023-01-24 RX ORDER — EMPAGLIFLOZIN 10 MG/1
TABLET, FILM COATED ORAL
Qty: 90 TABLET | Refills: 1 | Status: SHIPPED | OUTPATIENT
Start: 2023-01-24

## 2023-01-24 RX ORDER — INSULIN LISPRO 100 [IU]/ML
INJECTION, SOLUTION INTRAVENOUS; SUBCUTANEOUS
Qty: 10 ADJUSTABLE DOSE PRE-FILLED PEN SYRINGE | Refills: 3 | Status: SHIPPED | OUTPATIENT
Start: 2023-01-24

## 2023-01-24 NOTE — TELEPHONE ENCOUNTER
Medication:   Requested Prescriptions     Pending Prescriptions Disp Refills    JARDIANCE 10 MG tablet [Pharmacy Med Name: Lena Valladares 10 MG TABLET] 90 tablet 1     Sig: TAKE ONE TABLET BY MOUTH DAILY       Last Filled:      Patient Phone Number: [phone] (home)     Last appt: 1/24/2023   Next appt: 5/9/2023    Last Labs DM:   Lab Results   Component Value Date/Time    LABA1C 6.2 09/13/2022 09:34 AM

## 2023-01-24 NOTE — PROGRESS NOTES
Seen as f/u patient for diabetes    Interim;       Using CGM  Glucose stable  Ran out of ozempic for sometime, was on steroids  10 lb gain  Ran out of tresiba for a week    50 lb weight loss  Some night lows  Needs to loose more weight for the surgery    Diagnosed with Type 2 diabetes mellitus at age 14 years  Known diabetic complications: none   Uncontrolled  Insulin started a few year after diagnosis  On metformin initially at time of diagnosis    Current diabetic medications     Tresiba 132units  Ozempic 2mg  Novolog 20 units AC TID     SSI 2 for 50 >150  Jardiance 10mg    Did not tolerate metformin    Last A1c 6.8%<------ 6.2%<----- 8%<---- 7.8%<----- 9.6%<----- 11.8%<-----9.6%<-----9.4%<-----10.1%<------- 11.7 on 7/18 <--- 11.3 <--- 10.9    Prior visit with dietician: Yes   Current diet: on average, 3 meals per day   Current exercise: walking   Current monitoring regimen: home blood tests - not checking for a month    Has brought blood glucose log/meter: yes   Home blood sugar records: CGM    1/11-1/24  61 % in range  34 % high  average 175    No significant lows    Any episodes of hypoglycemia? --    No Hx of CAD , PVD, CVA    Hyperlipidemia:   Not on medication  uncontrolled   on 7/18   on 1/19   on 8/20   on 11/21    Last eye exam: 2022  Last foot exam: 1/19  Last microalbumin to creatinine ratio:11/21    She has a h/o PCOS, on OCP    H/o obesity. Has been gaining weight. She is on low CHO diet  Does reports ate more, increased appetite    She has vit D deficiency  Last 7 on 4.19    Takes vit D 50,000 IU1/week  Restarted 4/21  Level was 18      SH: Mom is our clinic patient, maureen carrillo    Past Medical History:   Diagnosis Date    Acne     Allergic rhinitis     Anxiety     Asthma     Cellulitis     on back/ on bactrim for/ pcp aware    COVID-19 12/2020    Depression     Diabetes mellitus type 1 (HCC) 5/07    Folic acid deficiency     Hyperlipidemia     Morbid obesity  with BMI of 70 and over, adult [facility])     Obesity     Obstructive sleep apnea 1/5/2011    TAY (obstructive sleep apnea) 7/07    PCOS (polycystic ovarian syndrome)     Pre-operative clearance     Pyelonephritis 12/08    left    Vitamin D deficiency      Past Surgical History:   Procedure Laterality Date    CHOLECYSTECTOMY  1/15/2013    MULTIPORT ROBOTIC ASSISTED LAPAROSCOPIC CHOLECYSTECTOMY    CYST REMOVAL      OTHER SURGICAL HISTORY  2009    Cyst/Mass excised from posterior neck    OTHER SURGICAL HISTORY  12/15    excision back lesion-Dr. Mary Canela    WISDOM TOOTH EXTRACTION       Current Outpatient Medications   Medication Sig Dispense Refill    escitalopram (LEXAPRO) 20 MG tablet TAKE ONE TABLET BY MOUTH DAILY 30 tablet 5    Continuous Blood Gluc  (DEXCOM G6 ) JERICA USE FOR CONTINUOUS BLOOD GLUCOSE MONITORING 1 each 0    insulin lispro, 1 Unit Dial, (HUMALOG/ADMELOG) 100 UNIT/ML SOPN INJECT 20 TO 24 UNITS UNDER THE SKIN BEFORE MEALS THREE TIMES A DAY 20 mL 2    Semaglutide, 2 MG/DOSE, (OZEMPIC, 2 MG/DOSE,) 8 MG/3ML SOPN 2mg SC weekly 3 mL 3    lisinopril (PRINIVIL;ZESTRIL) 10 MG tablet TAKE ONE TABLET BY MOUTH DAILY 30 tablet 5    TRESIBA FLEXTOUCH 200 UNIT/ML SOPN INJECT 140 UNITS UNDER THE SKIN DAILY 24 mL 3    JARDIANCE 10 MG tablet TAKE ONE TABLET BY MOUTH DAILY 90 tablet 1    albuterol sulfate HFA (PROVENTIL;VENTOLIN;PROAIR) 108 (90 Base) MCG/ACT inhaler Inhale 2 puffs into the lungs every 6 hours as needed for Wheezing 1 each 1    Continuous Blood Gluc Transmit (DEXCOM G6 TRANSMITTER) MISC For continuous glucose monitoring .   Diagnosis code E10.9 1 each 3    busPIRone (BUSPAR) 7.5 MG tablet Take 1 tablet by mouth 2 times daily 60 tablet 5    Norgestim-Eth Estrad Triphasic 0.18/0.215/0.25 MG-35 MCG TABS Take 1 tablet by mouth daily 84 tablet 3    vitamin D (ERGOCALCIFEROL) 1.25 MG (48622 UT) CAPS capsule Take 1 capsule by mouth once a week 8 capsule 0    montelukast (SINGULAIR) 10 MG tablet Take 1 tablet by mouth nightly 30 tablet 5    ondansetron (ZOFRAN) 8 MG tablet Take 1 tablet by mouth every 8 hours as needed for Nausea 24 tablet 1    cyclobenzaprine (FLEXERIL) 10 MG tablet Take 1 po q 8 hours prn low back pain/ tension headaches. 45 tablet 2    Insulin Pen Needle (B-D UF III MINI PEN NEEDLES) 31G X 5 MM MISC 4 times a day 150 each 6     No current facility-administered medications for this visit. Review of Systems  Please see scanned document dated and signed      Objective:       Constitutional: Well-developed, appears stated age, cooperative, in no acute distress  H/E/N/M/T:atraumatic, normocephalic, external ears, nose, lips normal without lesions  Eyes: Lids, lashes, conjunctivae and sclerae normal, No proptosis, no redness  Neck: supple, symmetrical, no swelling  Skin: No obvious rashes or lesions present. Skin and hair texture normal  Psychiatric: Judgement and Insight:  judgement and insight appear normal  Neuro: Normal without focal findings, speech is normal normal, speech is spontaneous  Chest: No labored breathing, no chest deformity, no stridor  Musculoskeletal: No joint deformity, swelling    Foot exam: No ulcer, monofilament detected    Lab Reviewed   No components found for: CHLPL  Lab Results   Component Value Date    TRIG 77 09/13/2022    TRIG 101 11/30/2021    TRIG 97 01/23/2019     Lab Results   Component Value Date    HDL 64 (H) 09/13/2022    HDL 62 (H) 11/30/2021    HDL 51 01/23/2019     Lab Results   Component Value Date    LDLCALC 108 (H) 09/13/2022    LDLCALC 110 (H) 11/30/2021    LDLCALC 156 (H) 01/23/2019     Lab Results   Component Value Date    LABVLDL 15 09/13/2022    LABVLDL 20 11/30/2021    LABVLDL 19 01/23/2019     Lab Results   Component Value Date    LABA1C 6.2 09/13/2022       Assessment:     Shazia Pretty is a 33 y.o. female with :    1.T2DM: Longstanding, controlled, insulin resistant. On high dose of insulin.  Discussed weight loss, diet changes, she is on GLP-1 agonist.  Fasting high as out of tresiba, restart  Using CGM. May need U-500 but weight gain can be an issue   No plan for pregnancy. Discussed risk with uncontrolled DM  Continue SGLT-2 inhibitor  May switch ozempic to mounjaro  2. HLD : LDL above target, recommend statin  3. Obesity: Discussed weight loss, advise 1500 Kcal diet. Discussed bariatric surgery, she is seeing Dr. Christiano Paulino, was on Qsymia  Switched trulicity to ozempic and assess weight loss  4. PCOS: On OCP  5. Vit D deficiency: Repeat improved    Plan:      Tresiba 132 units   Novolog  20   SSI 2 for 50 >150   Ozempic 2mg   Advise to check blood sugar 4 times a day   Patient to send blood sugar log for titration. Advise to exercise regularly. Advise to low simple carbohydrate and protein with each  meal diet. Diabetes Care: recommend yearly eye exam, foot exam and urine microalbumin to   creatinine ratio. Patient is up-to-date.

## 2023-01-26 ENCOUNTER — TELEMEDICINE (OUTPATIENT)
Dept: FAMILY MEDICINE CLINIC | Age: 33
End: 2023-01-26
Payer: COMMERCIAL

## 2023-01-26 DIAGNOSIS — G47.33 OBSTRUCTIVE SLEEP APNEA: Chronic | ICD-10-CM

## 2023-01-26 DIAGNOSIS — Z79.4 CONTROLLED TYPE 2 DIABETES MELLITUS WITH COMPLICATION, WITH LONG-TERM CURRENT USE OF INSULIN (HCC): Primary | Chronic | ICD-10-CM

## 2023-01-26 DIAGNOSIS — F41.8 DEPRESSION WITH ANXIETY: Chronic | ICD-10-CM

## 2023-01-26 DIAGNOSIS — E66.01 MORBID OBESITY (HCC): ICD-10-CM

## 2023-01-26 DIAGNOSIS — E11.8 CONTROLLED TYPE 2 DIABETES MELLITUS WITH COMPLICATION, WITH LONG-TERM CURRENT USE OF INSULIN (HCC): Primary | Chronic | ICD-10-CM

## 2023-01-26 DIAGNOSIS — E78.00 PURE HYPERCHOLESTEROLEMIA: Chronic | ICD-10-CM

## 2023-01-26 DIAGNOSIS — J06.9 ACUTE URI: ICD-10-CM

## 2023-01-26 DIAGNOSIS — G47.33 OSA (OBSTRUCTIVE SLEEP APNEA): ICD-10-CM

## 2023-01-26 DIAGNOSIS — E28.2 POLYCYSTIC OVARIAN DISEASE: ICD-10-CM

## 2023-01-26 PROCEDURE — 99214 OFFICE O/P EST MOD 30 MIN: CPT | Performed by: FAMILY MEDICINE

## 2023-01-26 RX ORDER — ESCITALOPRAM OXALATE 20 MG/1
TABLET ORAL
Qty: 30 TABLET | Refills: 5 | Status: SHIPPED | OUTPATIENT
Start: 2023-01-26

## 2023-01-26 RX ORDER — BUSPIRONE HYDROCHLORIDE 7.5 MG/1
7.5 TABLET ORAL 2 TIMES DAILY
Qty: 60 TABLET | Refills: 5 | Status: SHIPPED | OUTPATIENT
Start: 2023-01-26 | End: 2023-01-29

## 2023-01-26 RX ORDER — SEMAGLUTIDE 2.68 MG/ML
INJECTION, SOLUTION SUBCUTANEOUS
Qty: 3 ML | Refills: 3 | Status: SHIPPED | OUTPATIENT
Start: 2023-01-26

## 2023-01-26 RX ORDER — NORGESTIMATE AND ETHINYL ESTRADIOL 7DAYSX3 28
1 KIT ORAL DAILY
Qty: 84 TABLET | Refills: 3 | Status: SHIPPED | OUTPATIENT
Start: 2023-01-26

## 2023-01-26 RX ORDER — LISINOPRIL 10 MG/1
TABLET ORAL
Qty: 30 TABLET | Refills: 5 | Status: SHIPPED | OUTPATIENT
Start: 2023-01-26

## 2023-01-26 SDOH — ECONOMIC STABILITY: FOOD INSECURITY: WITHIN THE PAST 12 MONTHS, YOU WORRIED THAT YOUR FOOD WOULD RUN OUT BEFORE YOU GOT MONEY TO BUY MORE.: NEVER TRUE

## 2023-01-26 SDOH — ECONOMIC STABILITY: FOOD INSECURITY: WITHIN THE PAST 12 MONTHS, THE FOOD YOU BOUGHT JUST DIDN'T LAST AND YOU DIDN'T HAVE MONEY TO GET MORE.: NEVER TRUE

## 2023-01-26 ASSESSMENT — PATIENT HEALTH QUESTIONNAIRE - PHQ9
1. LITTLE INTEREST OR PLEASURE IN DOING THINGS: 0
8. MOVING OR SPEAKING SO SLOWLY THAT OTHER PEOPLE COULD HAVE NOTICED. OR THE OPPOSITE, BEING SO FIGETY OR RESTLESS THAT YOU HAVE BEEN MOVING AROUND A LOT MORE THAN USUAL: 0
6. FEELING BAD ABOUT YOURSELF - OR THAT YOU ARE A FAILURE OR HAVE LET YOURSELF OR YOUR FAMILY DOWN: 0
SUM OF ALL RESPONSES TO PHQ QUESTIONS 1-9: 0
SUM OF ALL RESPONSES TO PHQ9 QUESTIONS 1 & 2: 0
5. POOR APPETITE OR OVEREATING: 0
SUM OF ALL RESPONSES TO PHQ QUESTIONS 1-9: 0
SUM OF ALL RESPONSES TO PHQ QUESTIONS 1-9: 0
2. FEELING DOWN, DEPRESSED OR HOPELESS: 0
3. TROUBLE FALLING OR STAYING ASLEEP: 0
9. THOUGHTS THAT YOU WOULD BE BETTER OFF DEAD, OR OF HURTING YOURSELF: 0
4. FEELING TIRED OR HAVING LITTLE ENERGY: 0
10. IF YOU CHECKED OFF ANY PROBLEMS, HOW DIFFICULT HAVE THESE PROBLEMS MADE IT FOR YOU TO DO YOUR WORK, TAKE CARE OF THINGS AT HOME, OR GET ALONG WITH OTHER PEOPLE: 0
7. TROUBLE CONCENTRATING ON THINGS, SUCH AS READING THE NEWSPAPER OR WATCHING TELEVISION: 0
SUM OF ALL RESPONSES TO PHQ QUESTIONS 1-9: 0

## 2023-01-26 ASSESSMENT — SOCIAL DETERMINANTS OF HEALTH (SDOH): HOW HARD IS IT FOR YOU TO PAY FOR THE VERY BASICS LIKE FOOD, HOUSING, MEDICAL CARE, AND HEATING?: NOT HARD AT ALL

## 2023-01-26 NOTE — PROGRESS NOTES
Here for f/u and recheck of blood sugars, blood pressure, mood    Pt states that overall things are doing well, staying healthy and feels well. Pts blood sugars are doing great. Pts last a1c a few months ago was 6.2, and has been doing well with use of ozempic. Currently at 2mg dosing. Pt was on steroids and her blood sugars did bump up to 220-230's. Have improved but still seeing 190's. Pt states that she continues to be short on her tresiba. blood sugars done in office was 6.8. Pt states that since last visit, did gain 10#. Pt was on qsymia but states that she felt that it was not working as well, so did not continue. Pt felt she hit a plateau and stopped taking. In addition to that, did     Pt did have some URI sx over the winter holidays, states that sx have lingered with some persistent irritation in throat. Did have moderate cough, that has improved. Pt did check for covid/strep/flu and everything was normal.  Pt was seen in urgent care and given abx and steroids. Pt states mood is doing well, feels that her current therapy is doing well. Pt continues to work with dr. Genesis Cameron, and that is doing great for her. Pt is doing well to focus on keeping herself busy. Anxiety sx are a little bit higher, states that she struggles with going to grocery store. Pt is managing, and using her coping skills. Except as noted above in the history of present illness, the review of systems is  negative for headache, vision changes, chest pain, shortness of breath, abdominal pain, urinary sx, bowel changes. Past medical, surgical, and social history reviewed and updated  Medications and allergies reviewed and updated        1/26/2023    TELEHEALTH EVALUATION -- Audio/Visual (During VEGFO-95 public health emergency)      Due to COVID 19 outbreak, patient's office visit was converted to a virtual visit.   Patient was contacted and agreed to proceed with a virtual visit via [address] Visit  The risks and benefits of converting to a virtual visit were discussed in light of the current infectious disease epidemic. Patient also understood that insurance coverage and co-pays are up to their individual insurance plans. As above    Review of Systems  Except as noted above in the history of present illness, the review of systems is  negative for headache, vision changes, chest pain, shortness of breath, abdominal pain, urinary sx, bowel changes. Past medical, surgical, and social history reviewed and updated  Medications and allergies reviewed and updated        Social History     Tobacco Use    Smoking status: Never    Smokeless tobacco: Never   Vaping Use    Vaping Use: Never used   Substance Use Topics    Alcohol use:  Yes     Alcohol/week: 0.0 standard drinks     Types: 1 Standard drinks or equivalent per week     Comment: social    Drug use: No        Current Outpatient Medications   Medication Sig Dispense Refill    Insulin Degludec (TRESIBA FLEXTOUCH) 200 UNIT/ML SOPN 136 units daily  dispense 12 pens 12 Adjustable Dose Pre-filled Pen Syringe 3    insulin lispro, 1 Unit Dial, (HUMALOG KWIKPEN) 100 UNIT/ML SOPN 22-26 units AC TID 10 Adjustable Dose Pre-filled Pen Syringe 3    JARDIANCE 10 MG tablet TAKE ONE TABLET BY MOUTH DAILY 90 tablet 1    escitalopram (LEXAPRO) 20 MG tablet TAKE ONE TABLET BY MOUTH DAILY 30 tablet 5    Continuous Blood Gluc  (DEXCOM G6 ) JERICA USE FOR CONTINUOUS BLOOD GLUCOSE MONITORING 1 each 0    insulin lispro, 1 Unit Dial, (HUMALOG/ADMELOG) 100 UNIT/ML SOPN INJECT 20 TO 24 UNITS UNDER THE SKIN BEFORE MEALS THREE TIMES A DAY 20 mL 2    Semaglutide, 2 MG/DOSE, (OZEMPIC, 2 MG/DOSE,) 8 MG/3ML SOPN 2mg SC weekly 3 mL 3    lisinopril (PRINIVIL;ZESTRIL) 10 MG tablet TAKE ONE TABLET BY MOUTH DAILY 30 tablet 5    albuterol sulfate HFA (PROVENTIL;VENTOLIN;PROAIR) 108 (90 Base) MCG/ACT inhaler Inhale 2 puffs into the lungs every 6 hours as needed for Wheezing 1 each 1 Continuous Blood Gluc Transmit (DEXCOM G6 TRANSMITTER) MISC For continuous glucose monitoring . Diagnosis code E10.9 1 each 3    busPIRone (BUSPAR) 7.5 MG tablet Take 1 tablet by mouth 2 times daily 60 tablet 5    Norgestim-Eth Estrad Triphasic 0.18/0.215/0.25 MG-35 MCG TABS Take 1 tablet by mouth daily 84 tablet 3    montelukast (SINGULAIR) 10 MG tablet Take 1 tablet by mouth nightly 30 tablet 5    ondansetron (ZOFRAN) 8 MG tablet Take 1 tablet by mouth every 8 hours as needed for Nausea 24 tablet 1    cyclobenzaprine (FLEXERIL) 10 MG tablet Take 1 po q 8 hours prn low back pain/ tension headaches. 45 tablet 2    Insulin Pen Needle (B-D UF III MINI PEN NEEDLES) 31G X 5 MM MISC 4 times a day 150 each 6    vitamin D (ERGOCALCIFEROL) 1.25 MG (17375 UT) CAPS capsule Take 1 capsule by mouth once a week (Patient not taking: Reported on 1/26/2023) 8 capsule 0     No current facility-administered medications for this visit. No Known Allergies     PHYSICAL EXAMINATION:    All boxes checked are findings on exam, empty boxes are negative or not evaluated during the examination  Limitation in exam due to use of video conferencing software, virtual visits    Vital Signs: (As obtained by patient/caregiver or practitioner observation)  There were no vitals taken for this visit. Constitutional: [x] Appears well-developed and well-nourished [x] No apparent distress      [] Abnormal-   Mental status  [x] Alert and awake  [x] Oriented to person/place/time []Able to follow commands      Eyes:  EOM    []  Normal  [] Abnormal-  Sclera  []  Normal  [] Abnormal -         Discharge []  None visible  [] Abnormal -    HENT:   [x] Normocephalic, atraumatic.   [] Abnormal   [] Mouth/Throat: Mucous membranes are moist.     External Ears [] Normal  [] Abnormal-     Neck: [x] No visualized mass     Pulmonary/Chest: [x] Respiratory effort normal.  [x] No visualized signs of difficulty breathing or respiratory distress        [] Abnormal-      Musculoskeletal:   [] Normal gait with no signs of ataxia         [] Normal range of motion of neck        [] Abnormal-       Neurological:        [] No Facial Asymmetry (Cranial nerve 7 motor function) (limited exam to video visit)          [] No gaze palsy        [] Abnormal-         Skin:        [] No significant exanthematous lesions or discoloration noted on facial skin         [] Abnormal-            Psychiatric:       [x] Normal Affect [x] No Hallucinations        [] Abnormal-     Other pertinent observable physical exam findings-     Due to this being a TeleHealth encounter, evaluation of the following organ systems is limited: Vitals/Constitutional/EENT/Resp/CV/GI//MS/Neuro/Skin/Heme-Lymph-Imm. ASSESSMENT/PLAN:      1. Polycystic ovarian disease  Cont weight loss, supportive therapy  Cont OCP  - Norgestim-Eth Estrad Triphasic 0.18/0.215/0.25 MG-35 MCG TABS; Take 1 tablet by mouth daily  Dispense: 84 tablet; Refill: 3    2. Controlled type 2 diabetes mellitus with complication, with long-term current use of insulin (HCC)  Doing well, a1c at 6.8  Cont current therapy   Check bloodwork and urine as below  - Microalbumin / Creatinine Urine Ratio; Future  - CBC; Future    3. Obstructive sleep apnea  Cont CPAP therapy  Weight loss encouraged    4. Acute URI  Resolving with abx, steroids    5. Depression with anxiety  Mood doing ok with some mild breakthru anxiety  Cont supportive therapy and will monitor  Cont buspar, lexapro  Cont counseling/therapy  Does not feel need to adjust meds    6. Pure hypercholesterolemia  Check cmp, lipids  - Comprehensive Metabolic Panel; Future  - Lipid Panel; Future    7. TAY (obstructive sleep apnea)  Cont CPAP therapy    8. Morbid obesity ([address]  Cont lifestyle mgt  Off qsymia  Cont ozempic, [email or web address]               Follow-up appointment:   Pending bloodwork  3 months  Prn     Discussed use, benefit, and side effects of all prescribed medications.   Barriers to medication compliance addressed. All patient questions answered. Pt voiced understanding. When applicable, patient's outside records were reviewed through [facility]. The patient has signed appropriate paperworks/consents. An  electronic signature was used to authenticate this note. --Marisela Gonzalez MD on 1/26/2023 at 8:47 AM      Pursuant to the emergency declaration under the [address] [place] [address], [facility] waiver authority and the [employer] Act, this Virtual  Visit was conducted, with patient's consent, to reduce the patient's risk of exposure to COVID-19 and provide continuity of care for an established patient. Services were provided through a video synchronous discussion virtually to substitute for in-person clinic visit.

## 2023-01-27 RX ORDER — INSULIN DEGLUDEC 200 U/ML
INJECTION, SOLUTION SUBCUTANEOUS
Qty: 24 ML | OUTPATIENT
Start: 2023-01-27

## 2023-01-29 RX ORDER — BUSPIRONE HYDROCHLORIDE 7.5 MG/1
TABLET ORAL
Qty: 60 TABLET | Refills: 5 | Status: SHIPPED | OUTPATIENT
Start: 2023-01-29

## 2023-01-31 ENCOUNTER — TELEMEDICINE (OUTPATIENT)
Dept: PULMONOLOGY | Age: 33
End: 2023-01-31
Payer: COMMERCIAL

## 2023-01-31 ENCOUNTER — TELEMEDICINE (OUTPATIENT)
Dept: PSYCHOLOGY | Age: 33
End: 2023-01-31
Payer: COMMERCIAL

## 2023-01-31 DIAGNOSIS — F33.0 MILD EPISODE OF RECURRENT MAJOR DEPRESSIVE DISORDER (HCC): ICD-10-CM

## 2023-01-31 DIAGNOSIS — Z79.4 CONTROLLED TYPE 2 DIABETES MELLITUS WITH COMPLICATION, WITH LONG-TERM CURRENT USE OF INSULIN (HCC): Chronic | ICD-10-CM

## 2023-01-31 DIAGNOSIS — E11.8 CONTROLLED TYPE 2 DIABETES MELLITUS WITH COMPLICATION, WITH LONG-TERM CURRENT USE OF INSULIN (HCC): Chronic | ICD-10-CM

## 2023-01-31 DIAGNOSIS — F41.9 ANXIETY: Primary | ICD-10-CM

## 2023-01-31 DIAGNOSIS — E66.01 MORBID OBESITY WITH BMI OF 70 AND OVER, ADULT (HCC): Chronic | ICD-10-CM

## 2023-01-31 DIAGNOSIS — G47.33 OBSTRUCTIVE SLEEP APNEA: Primary | Chronic | ICD-10-CM

## 2023-01-31 PROCEDURE — 99214 OFFICE O/P EST MOD 30 MIN: CPT | Performed by: NURSE PRACTITIONER

## 2023-01-31 PROCEDURE — 90832 PSYTX W PT 30 MINUTES: CPT | Performed by: PSYCHOLOGIST

## 2023-01-31 ASSESSMENT — SLEEP AND FATIGUE QUESTIONNAIRES
HOW LIKELY ARE YOU TO NOD OFF OR FALL ASLEEP WHILE SITTING INACTIVE IN A PUBLIC PLACE: 1
HOW LIKELY ARE YOU TO NOD OFF OR FALL ASLEEP WHILE SITTING QUIETLY AFTER LUNCH WITHOUT ALCOHOL: 1
HOW LIKELY ARE YOU TO NOD OFF OR FALL ASLEEP WHILE WATCHING TV: 2
HOW LIKELY ARE YOU TO NOD OFF OR FALL ASLEEP IN A CAR, WHILE STOPPED FOR A FEW MINUTES IN TRAFFIC: 0
HOW LIKELY ARE YOU TO NOD OFF OR FALL ASLEEP WHEN YOU ARE A PASSENGER IN A CAR FOR AN HOUR WITHOUT A BREAK: 1
ESS TOTAL SCORE: 9
HOW LIKELY ARE YOU TO NOD OFF OR FALL ASLEEP WHILE SITTING AND TALKING TO SOMEONE: 1
HOW LIKELY ARE YOU TO NOD OFF OR FALL ASLEEP WHILE LYING DOWN TO REST IN THE AFTERNOON WHEN CIRCUMSTANCES PERMIT: 2
HOW LIKELY ARE YOU TO NOD OFF OR FALL ASLEEP WHILE SITTING AND READING: 1

## 2023-01-31 NOTE — LETTER
[facility] Sleep Medicine  [address]  [place]  [address]  Phone: [phone]  Fax: [phone]    January 31, 2023       Patient: Rachel Robbins   MR Number: [id number]   YOB: 1990   Date of Visit: 1/31/2023       Rachel Robbins was seen for a follow up visit today. Here is my assessment and plan as well as an attached copy of her visit today:    Controlled type 2 diabetes mellitus with complication, with long-term current use of insulin ([address]  Chronic- Stable. Discussed the importance of treating obstructive sleep apnea as part of the management of this disorder. Cont any meds per PCP and other physicians. Encouraged her to monitor overnight and fasting BGs closely as she starts using her machine due to potential risks of hypoglycemia as Obstructive sleep apnea becomes controlled. Patient states understanding. Morbid obesity with BMI of 70 and over, adult ([address]  Chronic-not stable:  Discussed importance of treating obstructive sleep apnea and getting sufficient sleep to assist with weight control. Encouraged her to work on weight loss through diet and exercise. Recommended DASH or Mediterranean diets. Obstructive sleep apnea  Chronic-with progression/exacerbation: Reviewed results of PSG and titration sleep study with patient. Reviewed and analyzed results of physiologic download from patient's machine and reviewed with patient. Supplies and parts as needed for her machine. These are medically necessary. Limit caffeine use after 3pm. Based on the analyzed data will change following settings: EPAP min increased to 15, Humidity increased to 6. Pressure changes sent via machine modem. Will trial pressure increase to see if this will help her to keep her mask on all night. Encouraged her to work with her DME on mask fit and comfort. Provided resources for her to view different styles of masks. Encouraged her to use her machine as much as possible.  Will see her back in 8 weeks. Encouraged  Her to contact the office with any questions or concerns. Encouraged consistent use of her machine each night, all night. Discussed the importance of treating Obstructive sleep apnea from a physiological standpoint. Instructed not to drive unless had 4 hrs of effective therapy for her TAY the night before. Did review the risks of under or untreated TAY including, but not limited to, higher risks of motor vehicle accidents, stroke, heart attacks, and death. She understands and accepts all these risks. If you have questions or concerns, please do not hesitate to call me. I look forward to following Estevan Rodriguez along with you.     Sincerely,    DARYL Guzman    CC providers:  Sabino Lowry MD  [facility]  [address] [facility],[id number]  [address] [place]

## 2023-01-31 NOTE — PROGRESS NOTES
Behavioral Health Consultation  Khadar Carrasquillo Psy.D. Psychologist  1/31/2023  1:30-2 PM EDT      Time spent with Patient: 30 minutes  This is patient's seventh [facility] appointment for this episode of care. Reason for Consult: Depression, anxiety  Referring Provider: MD Joseph Escobar [address] (During TCESK-40 public health emergency)    Tristin Fernando was evaluated through a synchronous (real-time) audio-video encounter using HIPAA-compliant technology. The patient (or guardian, if applicable) is aware that this is a billable service, which includes applicable co-pays. This Virtual Visit was conducted with patient's (and/or legal guardian's) consent. The visit was conducted pursuant to the emergency declaration under the [address] [place] [address], [address] [place] [address] authority and the [employer] Act. Patient identification was verified, and a caregiver was present when appropriate. The patient was located in a state where the provider was licensed to provide care. Conducted a risk-benefit analysis and determined that the patient's presenting problems are consistent with the use of telepsychology. Determined that the patient has sufficient knowledge and skills in the use of technology enabling them to adequately benefit from telepsychology. It was determined that this patient was able to be properly treated without an in-person session. Patient verified current location at the beginning of the visit. Verified the following information:  Patient's identification: Yes  Patient location: Work @ [place] [address]  Patient's call back number: [phone]  Patient's emergency contact's name and number, as well as permission to contact them if needed:  Extended Emergency Contact Information  Primary Emergency Contact: Lupe Vides  [address] Phone: [phone]  Mobile Phone: [phone]  Relation: Parent  Secondary Emergency Contact: Walt Fleming  Address: [place] [address] Phone: [phone]  Relation: Parent    Provider location: [place]:  Pt has been feeling better since the new year began. She is focused on continuing the weight loss that began in 2022. She is focused on increasing self-compassion. Also wants to improve financial health. She is making an effort to engage more with her support system. Setting appropriate boundaries as well. O:  Interventions:  -Supportive techniques  -Processed experiences / stressors / concerns  -Reinforced efforts towards self-care  -Engaged in cognitive reappraisal      A:  MSE:  Appearance: good hygiene  and appropriate attire  Attitude: cooperative and friendly  Consciousness: alert  Orientation: oriented to person, place, time, general circumstance  Memory: recent and remote memory intact  Attention/Concentration: intact during session  Psychomotor Activity: normal  Eye Contact: normal  Speech: normal rate and volume, well-articulated  Mood: mildly anxious  Affect: congruent, brighter  Perception: within normal limits  Thought Content: within normal limits  Thought Process: logical, coherent and goal-directed  Insight: good  Judgment: intact  Ability to understand instructions: Yes  Ability to respond meaningfully: Yes  Morbid Ideation: no   Suicide Assessment: no suicidal ideation, plan, or intent. Appropriate for outpatient / telehealth care at this time.   Homicidal Ideation: no      PHQ Scores 1/26/2023 10/14/2022 10/14/2022 7/14/2022 7/14/2022 1/24/2022 11/12/2018   PHQ2 Score 0 4 0 3 0 4 2   PHQ9 Score 0 10 0 6 0 19 2     Interpretation of Total Score Depression Severity: 1-4 = Minimal depression, 5-9 = Mild depression, 10-14 = Moderate depression, 15-19 = Moderately severe depression, 20-27 = Severe depression    Diagnosis:    1. Anxiety    2. Mild episode of recurrent major depressive disorder [facility])          Patient Active Problem List   Diagnosis    Asthma    Polycystic ovarian disease    Obstructive sleep apnea    Pure hypercholesterolemia    Morbid obesity ([address]    Allergic rhinitis    Acne    Diabetes mellitus (HCC)    PCOS (polycystic ovarian syndrome)    TAY (obstructive sleep apnea)    Obesity    Folic acid deficiency    Headache    Major depressive disorder, single episode, mild (HCC)    Mass of subcutaneous tissue of back    Vitamin D deficiency    Depression with anxiety    Controlled type 2 diabetes mellitus with complication, with long-term current use of insulin ([address]    Morbid obesity with BMI of 70 and over, adult [facility])         Plan:  Pt interventions:  Supportive techniques, Emphasized self-care as important for managing overall health, and Cognitive strategies to target balanced thinking . Pt Behavioral Change Plan:  Pt set the following goals:  1. Return for a F/U virtual visit.

## 2023-01-31 NOTE — PROGRESS NOTES
Efe Weston CNP  Kaycee Whiteallyn CNP Dola  [address] [facility] [address] [facility], [address] [phone]   Larance Gitelman [address] [facility] Fl[facility], 801 [facility] [phone]     Video Visit- Follow up    Jt Espinoza, was evaluated through a synchronous (real-time) audio-video  encounter. The patient (or guardian if applicable) is aware that this is a billable  service, which includes applicable co-pays. This Virtual Visit was conducted with  patient's (and/or legal guardian's) consent. The visit was conducted pursuant to  the emergency declaration under the [address] [place] [address], [address] [place] [address] authority and the [employer] and  [employer] General Act. Patient identification was verified,  and a caregiver was present when appropriate. The patient was located in a  state where the provider was licensed to provide care. Assessment/Plan:      1. Obstructive sleep apnea  Assessment & Plan:  Chronic-with progression/exacerbation: Reviewed results of PSG and titration sleep study with patient. Reviewed and analyzed results of physiologic download from patient's machine and reviewed with patient. Supplies and parts as needed for her machine. These are medically necessary. Limit caffeine use after 3pm. Based on the analyzed data will change following settings: EPAP min increased to 15, Humidity increased to 6. Pressure changes sent via machine modem. Will trial pressure increase to see if this will help her to keep her mask on all night. Encouraged her to work with her DME on mask fit and comfort. Provided resources for her to view different styles of masks. Encouraged her to use her machine as much as possible. Discussed how to adjust the moisture settings on her machine. Will see her back in 8 weeks. Encouraged  Her to contact the office with any questions or concerns.     Encouraged consistent use of her machine each night, all night. Discussed the importance of treating Obstructive sleep apnea from a physiological standpoint. Instructed not to drive unless had 4 hrs of effective therapy for her TAY the night before. Did review the risks of under or untreated TAY including, but not limited to, higher risks of motor vehicle accidents, stroke, heart attacks, and death. She understands and accepts all these risks. 2. Controlled type 2 diabetes mellitus with complication, with long-term current use of insulin ([facility])  Assessment & Plan:  Chronic- Stable. Discussed the importance of treating obstructive sleep apnea as part of the management of this disorder. Cont any meds per PCP and other physicians. Encouraged her to monitor overnight and fasting BGs closely as she starts using her machine due to potential risks of hypoglycemia as Obstructive sleep apnea becomes controlled. Patient states understanding. 3. Morbid obesity with BMI of 70 and over, [facility])  Assessment & Plan:  Chronic-not stable:  Discussed importance of treating obstructive sleep apnea and getting sufficient sleep to assist with weight control. Encouraged her to work on weight loss through diet and exercise. Recommended DASH or Mediterranean diets. Reviewed, analyzed, and documented physiologic data from patient's PAP machine. This information was analyzed to assess complexity and medical decision making in regards to further testing and management. The primary encounter diagnosis was Obstructive sleep apnea. Diagnoses of Controlled type 2 diabetes mellitus with complication, with long-term current use of insulin ([facility] and Morbid obesity with BMI of 70 and over, [facility]) were also pertinent to this visit. The chronic medical conditions listed are directly related to the primary diagnosis listed above. The management of the primary diagnosis affects the secondary diagnosis and vice versa. Subjective:     Patient ID: Jojo Fuentes is a 33 y.o. female. Chief Complaint   Patient presents with    Sleep Apnea     Subjective   HPI:    Machine Modem/Download Info:  Compliance (hours/night): 2.7 hrs/night  % of nights >= 4 hrs: 4 %  Download AHI (/hour): 0.2 /HR   Average IPAP Pressure: 15.9 cmH2O  Average EPAP Pressure: 12.9 cmH2O               AUTO BILEVEL - Settings (ResMed)  IPAP Max: 25 cmH2O  EPAP Min: 13 cmH2O  Pressure Support: 3         PAP Mask  Mask Type: Full Face mask     Nisreen Lafleur presents today for follow-up for sleep apnea. she reports she is attempting to acclimate to her machine. She was sick in December and january and was not able to use her machine much. She also has been taking her mask off during the night without realizing. She may like to try a nasal mask but is unsure if it will work for her. The pressure on her machine is comfortable and she is waking rested for the most part. When she was using the machine, she could tell she was sleeping better and waking more rested. she denies headaches, nosebleeds, dryness, aerophagia, or drowsiness while driving. Had polysomnography on 2/24/22 which showed AHI-35.8/hr with low sat-73% and time below 90% of 4.6 min. Had titration done on 5/5/22: This study showed uncontrolle sleep related breathing disorder with several CPAP pressures. Patient has failed CPAP and needs changed to bilevel. Recommend auto bilevel pressures: IPAP max: 25, EPAP min: 13, PS: 3 cmH2O    GABY [address]    [place] -  Total Score: 9    Current Outpatient Medications   Medication Instructions    albuterol sulfate HFA (PROVENTIL;VENTOLIN;PROAIR) 108 (90 Base) MCG/ACT inhaler 2 puffs, Inhalation, EVERY 6 HOURS PRN    busPIRone (BUSPAR) 7.5 MG tablet TAKE ONE TABLET BY MOUTH TWICE A DAY    Continuous Blood Gluc  (DEXCOM G6 ) JERICA USE FOR CONTINUOUS BLOOD GLUCOSE MONITORING    Continuous Blood Gluc Transmit (DEXCOM G6 TRANSMITTER) MISC For continuous glucose monitoring . Diagnosis code E10.9    cyclobenzaprine (FLEXERIL) 10 MG tablet Take 1 po q 8 hours prn low back pain/ tension headaches.     escitalopram (LEXAPRO) 20 MG tablet TAKE ONE TABLET BY MOUTH DAILY    Insulin Degludec (TRESIBA FLEXTOUCH) 200 UNIT/ML SOPN 136 units daily  dispense 12 pens    insulin lispro, 1 Unit Dial, (HUMALOG KWIKPEN) 100 UNIT/ML SOPN 22-26 units AC TID    insulin lispro, 1 Unit Dial, (HUMALOG/ADMELOG) 100 UNIT/ML SOPN INJECT 20 TO 24 UNITS UNDER THE SKIN BEFORE MEALS THREE TIMES A DAY    Insulin Pen Needle (B-D UF III MINI PEN NEEDLES) 31G X 5 MM MISC 4 times a day    JARDIANCE 10 MG tablet TAKE ONE TABLET BY MOUTH DAILY    lisinopril (PRINIVIL;ZESTRIL) 10 MG tablet TAKE ONE TABLET BY MOUTH DAILY    montelukast (SINGULAIR) 10 mg, Oral, NIGHTLY    Norgestim-Eth Estrad Triphasic 0.18/0.215/0.25 MG-35 MCG TABS 1 tablet, Oral, DAILY    ondansetron (ZOFRAN) 8 mg, Oral, EVERY 8 HOURS PRN    Semaglutide, 2 MG/DOSE, (OZEMPIC, 2 MG/DOSE,) 8 MG/3ML SOPN 2mg SC weekly    vitamin D (ERGOCALCIFEROL) 50,000 Units, Oral, WEEKLY        Electronically signed by DARYL Webb on 1/31/2023 at 10:13 PM

## 2023-02-01 NOTE — ASSESSMENT & PLAN NOTE
Chronic- Stable. Discussed the importance of treating obstructive sleep apnea as part of the management of this disorder. Cont any meds per PCP and other physicians. Encouraged her to monitor overnight and fasting BGs closely as she starts using her machine due to potential risks of hypoglycemia as Obstructive sleep apnea becomes controlled. Patient states understanding.

## 2023-02-01 NOTE — PROGRESS NOTES
Diagnosis: [x] TAY (G47.33) [] CSA (G47.31) [] Apnea (G47.30)   Length of Need: [x] 15 Months [] 99 Months [] Other:   Machine (SG!): [] Respironics Dream Station      Auto [] ResMed AirSense     Auto [] Other:     []  CPAP () [] Bilevel ()   Mode: [] Auto [] Spontaneous    Mode: [] Auto [] Spontaneous            Comfort Settings:      Humidifier: [] Heated ()        [x] Water chamber replacement ()/ 1 per 6 months        Mask:   [x] Nasal () /1 per 3 months [] Full Face () /1 per 3 months   [x] Patient choice -Size and fit mask [] Patient Choice - Size and fit mask   [] Dispense: [] Dispense:   [x] Headgear () / 1 per 3 months [] Headgear () / 1 per 3 months   [x] Replacement Nasal Cushion ()/2 per month [] Interface Replacement ()/1 per month   [x] Replacement Nasal Pillows ()/2 per month         Tubing: [x] Heated ()/1 per 3 months    [] Standard ()/1 per 3 months [] Other:           Filters: [x] Non-disposable ()/1 per 6 months     [x] Ultra-Fine, Disposable ()/2 per month        Miscellaneous: [x] Chin Strap ()/ 1 per 6 months [] O2 bleed-in:        LPM   [] Oxymetry on CPAP/Bilevel []  Other:         Start Order Date: 01/31/23    MEDICAL JUSTIFICATION:  I, the undersigned, certify that the above prescribed supplies are medically necessary for this patients wellbeing. In my opinion, the supplies are both reasonable and necessary in reference to accepted standards of medicalpractice in treatment of this patients condition. Julia Walker NP    NPI: [id number]       Order Signed Date: 01/31/23  [address] [place] [address]  Pulmonary, Sleep, and Critical Care    Pulmonary, Sleep, and Critical Care  [address] [place] [address].  Suite [facility], [address] [place] [address] , 800 [facility]  Phone: [phone]    Fax: 308 [facility]  1990  [address]  [phone] (home)   [phone] (mobile)      Insurance Info (confirm with patient if correct):  Payer/Plan Subscr  Sex Relation Sub.  Ins. ID Effective Group Num

## 2023-02-01 NOTE — ASSESSMENT & PLAN NOTE
Chronic-with progression/exacerbation: Reviewed results of PSG and titration sleep study with patient. Reviewed and analyzed results of physiologic download from patient's machine and reviewed with patient. Supplies and parts as needed for her machine. These are medically necessary. Limit caffeine use after 3pm. Based on the analyzed data will change following settings: EPAP min increased to 15, Humidity increased to 6. Pressure changes sent via machine modem. Will trial pressure increase to see if this will help her to keep her mask on all night. Encouraged her to work with her DME on mask fit and comfort. Provided resources for her to view different styles of masks. Encouraged her to use her machine as much as possible. Discussed how to adjust the moisture settings on her machine. Will see her back in 8 weeks. Encouraged  Her to contact the office with any questions or concerns. Encouraged consistent use of her machine each night, all night. Discussed the importance of treating Obstructive sleep apnea from a physiological standpoint. Instructed not to drive unless had 4 hrs of effective therapy for her TAY the night before. Did review the risks of under or untreated TAY including, but not limited to, higher risks of motor vehicle accidents, stroke, heart attacks, and death. She understands and accepts all these risks.

## 2023-03-07 ENCOUNTER — OFFICE VISIT (OUTPATIENT)
Dept: GYNECOLOGY | Age: 33
End: 2023-03-07

## 2023-03-07 VITALS
SYSTOLIC BLOOD PRESSURE: 140 MMHG | OXYGEN SATURATION: 98 % | DIASTOLIC BLOOD PRESSURE: 84 MMHG | HEART RATE: 89 BPM | BODY MASS INDEX: 51.91 KG/M2 | HEIGHT: 63 IN | WEIGHT: 293 LBS

## 2023-03-07 DIAGNOSIS — E28.2 POLYCYSTIC OVARIAN DISEASE: ICD-10-CM

## 2023-03-07 DIAGNOSIS — Z01.419 WELL WOMAN EXAM WITH ROUTINE GYNECOLOGICAL EXAM: Primary | ICD-10-CM

## 2023-03-07 RX ORDER — NORGESTIMATE AND ETHINYL ESTRADIOL 7DAYSX3 28
1 KIT ORAL DAILY
Qty: 84 TABLET | Refills: 3 | Status: SHIPPED | OUTPATIENT
Start: 2023-03-07 | End: 2023-03-07

## 2023-03-07 RX ORDER — NORGESTIMATE AND ETHINYL ESTRADIOL 0.25-0.035
1 KIT ORAL DAILY
Qty: 3 PACKET | Refills: 3 | Status: SHIPPED | OUTPATIENT
Start: 2023-03-07 | End: 2023-03-18 | Stop reason: SDUPTHER

## 2023-03-09 ENCOUNTER — CLINICAL DOCUMENTATION (OUTPATIENT)
Dept: PSYCHOLOGY | Age: 33
End: 2023-03-09

## 2023-03-12 ASSESSMENT — ENCOUNTER SYMPTOMS
RESPIRATORY NEGATIVE: 1
GASTROINTESTINAL NEGATIVE: 1
EYES NEGATIVE: 1
ALLERGIC/IMMUNOLOGIC NEGATIVE: 1

## 2023-03-13 NOTE — PROGRESS NOTES
Subjective:      Patient ID: Shantell Diaz is a 33 y.o. female. Patient is here for annual.     Gynecologic Exam      Review of Systems   Constitutional: Negative. HENT: Negative. Eyes: Negative. Respiratory: Negative. Cardiovascular: Negative. Gastrointestinal: Negative. Endocrine: Negative. Genitourinary: Negative. Musculoskeletal: Negative. Skin: Negative. Allergic/Immunologic: Negative. Neurological: Negative. Hematological: Negative. Psychiatric/Behavioral: Negative. Date of Birth 1990  Past Medical History:   Diagnosis Date    Acne     Allergic rhinitis     Anxiety     Asthma     Cellulitis     on back/ on bactrim for/ pcp aware    COVID-19 2020    Depression     Diabetes mellitus type 1 ([facility]     Folic acid deficiency     Hyperlipidemia     Morbid obesity with BMI of 70 and over, adult ([facility]     Obesity     Obstructive sleep apnea 2011    TAY (obstructive sleep apnea)     PCOS (polycystic ovarian syndrome)     Pre-operative clearance     Pyelonephritis     left    Vitamin D deficiency      Past Surgical History:   Procedure Laterality Date    CHOLECYSTECTOMY  1/15/2013    MULTIPORT ROBOTIC ASSISTED LAPAROSCOPIC CHOLECYSTECTOMY    CYST REMOVAL      OTHER SURGICAL HISTORY      Cyst/Mass excised from posterior neck    OTHER SURGICAL HISTORY  12/15    excision back lesion-Dr. Valerie Guerra    WISDOM TOOTH EXTRACTION       OB History    Para Term  AB Living   0 0 0 0 0 0   SAB IAB Ectopic Molar Multiple Live Births   0 0 0 0 0 0     Social History     Socioeconomic History    Marital status: Single     Spouse name: Not on file    Number of children: Not on file    Years of education: Not on file    Highest education level: Not on file   Occupational History    Not on file   Tobacco Use    Smoking status: Never    Smokeless tobacco: Never   Vaping Use    Vaping Use: Never used   Substance and Sexual Activity    Alcohol use:  Yes Alcohol/week: 0.0 standard drinks     Types: 1 Standard drinks or equivalent per week     Comment: social    Drug use: No    Sexual activity: Never   Other Topics Concern    Not on file   Social History Narrative    Not on file     Social Determinants of Health     Financial Resource Strain: Low Risk     Difficulty of Paying Living Expenses: Not hard at all   Food Insecurity: No Food Insecurity    Worried About Running Out of Food in the Last Year: Never true    Ran Out of Food in the Last Year: Never true   Transportation Needs: Not on file   Physical Activity: Not on file   Stress: Not on file   Social Connections: Not on file   Intimate Partner Violence: Not on file   Housing Stability: Not on file     No Known Allergies  Outpatient Medications Marked as Taking for the 3/7/23 encounter (Office Visit) with Fatemeh Delgado MD   Medication Sig Dispense Refill    busPIRone (BUSPAR) 7.5 MG tablet TAKE ONE TABLET BY MOUTH TWICE A DAY 60 tablet 5    escitalopram (LEXAPRO) 20 MG tablet TAKE ONE TABLET BY MOUTH DAILY 30 tablet 5    lisinopril (PRINIVIL;ZESTRIL) 10 MG tablet TAKE ONE TABLET BY MOUTH DAILY 30 tablet 5    Semaglutide, 2 MG/DOSE, (OZEMPIC, 2 MG/DOSE,) 8 MG/3ML SOPN 2mg SC weekly 3 mL 3    [DISCONTINUED] Norgestim-Eth Estrad Triphasic 0.18/0.215/0.25 MG-35 MCG TABS Take 1 tablet by mouth daily 84 tablet 3    Insulin Degludec (TRESIBA FLEXTOUCH) 200 UNIT/ML SOPN 136 units daily  dispense 12 pens 12 Adjustable Dose Pre-filled Pen Syringe 3    insulin lispro, 1 Unit Dial, (HUMALOG KWIKPEN) 100 UNIT/ML SOPN 22-26 units AC TID 10 Adjustable Dose Pre-filled Pen Syringe 3    JARDIANCE 10 MG tablet TAKE ONE TABLET BY MOUTH DAILY 90 tablet 1    Continuous Blood Gluc  (DEXCOM G6 ) JERICA USE FOR CONTINUOUS BLOOD GLUCOSE MONITORING 1 each 0    insulin lispro, 1 Unit Dial, (HUMALOG/ADMELOG) 100 UNIT/ML SOPN INJECT 20 TO 24 UNITS UNDER THE SKIN BEFORE MEALS THREE TIMES A DAY 20 mL 2    albuterol sulfate HFA (PROVENTIL;VENTOLIN;PROAIR) 108 (90 Base) MCG/ACT inhaler Inhale 2 puffs into the lungs every 6 hours as needed for Wheezing 1 each 1    Continuous Blood Gluc Transmit (DEXCOM G6 TRANSMITTER) MISC For continuous glucose monitoring . Diagnosis code E10.9 1 each 3    vitamin D (ERGOCALCIFEROL) 1.25 MG (61294 UT) CAPS capsule Take 1 capsule by mouth once a week 8 capsule 0    montelukast (SINGULAIR) 10 MG tablet Take 1 tablet by mouth nightly 30 tablet 5    ondansetron (ZOFRAN) 8 MG tablet Take 1 tablet by mouth every 8 hours as needed for Nausea 24 tablet 1    cyclobenzaprine (FLEXERIL) 10 MG tablet Take 1 po q 8 hours prn low back pain/ tension headaches. 45 tablet 2    Insulin Pen Needle (B-D UF III MINI PEN NEEDLES) 31G X 5 MM MISC 4 times a day 150 each 6     Family History   Problem Relation Age of Onset    Diabetes Mother     Diabetes Father     High Blood Pressure Father     Asthma Father     Hypertension Father     Elevated Lipids Father     Prostate Cancer Father     Sleep Apnea Father      BP (!) 140/84 (Site: Right Lower Arm, Position: Sitting, Cuff Size: Large Adult)   Pulse 89   Ht 5' 2.5\" (1.588 m)   Wt (!) 421 lb 3.2 oz (191.1 kg)   SpO2 98%   BMI 75.81 kg/m²       Objective:   Physical Exam  Constitutional:       Appearance: Normal appearance. She is well-developed and normal weight. HENT:      Head: Normocephalic and atraumatic. Nose: Nose normal.      Mouth/Throat:      Mouth: Mucous membranes are moist.      Pharynx: Oropharynx is clear. Eyes:      Extraocular Movements: Extraocular movements intact. Neck:      Thyroid: No thyromegaly. Cardiovascular:      Rate and Rhythm: Normal rate and regular rhythm. Pulses: Normal pulses. Heart sounds: Normal heart sounds. No murmur heard. No friction rub. No gallop. Pulmonary:      Effort: Pulmonary effort is normal. No respiratory distress. Breath sounds: Normal breath sounds. No stridor. No wheezing, rhonchi or rales. Chest:      Chest wall: No tenderness. Breasts:     Right: Normal. No swelling, bleeding, inverted nipple, mass, nipple discharge, skin change or tenderness. Left: Normal. No swelling, bleeding, inverted nipple, mass, nipple discharge, skin change or tenderness. Abdominal:      General: Bowel sounds are normal. There is no distension. Palpations: Abdomen is soft. There is no mass. Tenderness: There is no abdominal tenderness. There is no guarding or rebound. Hernia: No hernia is present. There is no hernia in the left inguinal area or right inguinal area. Genitourinary:     General: Normal vulva. Exam position: Lithotomy position. Pubic Area: No rash. Labia:         Right: No rash, tenderness, lesion or injury. Left: No rash, tenderness, lesion or injury. Urethra: No prolapse, urethral pain, urethral swelling or urethral lesion. Vagina: No signs of injury and foreign body. No vaginal discharge, erythema, tenderness, bleeding, lesions or prolapsed vaginal walls. Cervix: No cervical motion tenderness, discharge, friability, lesion, erythema, cervical bleeding or eversion. Uterus: Not deviated, not enlarged, not fixed, not tender and no uterine prolapse. Adnexa:         Right: No mass, tenderness or fullness. Left: No mass, tenderness or fullness. Rectum: No anal fissure or external hemorrhoid. Comments: Normal urethral meatus, normal urethra, nl bladder  Musculoskeletal:         General: No swelling, tenderness, deformity or signs of injury. Normal range of motion. Cervical back: Normal range of motion and neck supple. No rigidity. Lymphadenopathy:      Cervical: No cervical adenopathy. Lower Body: No right inguinal adenopathy. No left inguinal adenopathy. Skin:     General: Skin is warm and dry. Coloration: Skin is not jaundiced or pale. Findings: No bruising, erythema, lesion or rash. Neurological:      General: No focal deficit present. Mental Status: She is alert and oriented to person, place, and time. Mental status is at baseline. Deep Tendon Reflexes: Reflexes are normal and symmetric. Psychiatric:         Mood and Affect: Mood normal.         Behavior: Behavior normal.         Thought Content:  Thought content normal.         Judgment: Judgment normal.       Assessment:      Annual        Plan:      Pap, calcium, exercise        Jud Olszewski, MD

## 2023-03-20 RX ORDER — NORGESTIMATE AND ETHINYL ESTRADIOL 0.25-0.035
1 KIT ORAL DAILY
Qty: 3 PACKET | Refills: 3 | Status: SHIPPED | OUTPATIENT
Start: 2023-03-20

## 2023-03-30 ENCOUNTER — TELEPHONE (OUTPATIENT)
Dept: PSYCHOLOGY | Age: 33
End: 2023-03-30

## 2023-03-30 NOTE — TELEPHONE ENCOUNTER
Pt was scheduled with me on a day and time that I am out of the office (4/6/2023). Please call her to reschedule. Thanks.

## 2023-05-16 ENCOUNTER — CLINICAL DOCUMENTATION (OUTPATIENT)
Dept: PSYCHOLOGY | Age: 33
End: 2023-05-16

## 2023-05-22 RX ORDER — SEMAGLUTIDE 2.68 MG/ML
INJECTION, SOLUTION SUBCUTANEOUS
Qty: 3 ML | Refills: 3 | Status: SHIPPED | OUTPATIENT
Start: 2023-05-22

## 2023-05-22 NOTE — TELEPHONE ENCOUNTER
Requested Prescriptions     Pending Prescriptions Disp Refills    Semaglutide, 2 MG/DOSE, (OZEMPIC, 2 MG/DOSE,) 8 MG/3ML SOPN 3 mL 3     Simg SC weekly         Last OV; 2023   Last labs; 2022  F/u N/A

## 2023-05-23 RX ORDER — INSULIN DEGLUDEC 200 U/ML
INJECTION, SOLUTION SUBCUTANEOUS
Qty: 12 ADJUSTABLE DOSE PRE-FILLED PEN SYRINGE | Refills: 3 | Status: SHIPPED | OUTPATIENT
Start: 2023-05-23

## 2023-05-23 NOTE — TELEPHONE ENCOUNTER
Medication:   Requested Prescriptions     Pending Prescriptions Disp Refills    Insulin Degludec (TRESIBA FLEXTOUCH) 200 UNIT/ML SOPN 12 Adjustable Dose Pre-filled Pen Syringe 3     Si units daily  dispense 12 pens       Last Filled:      Patient Phone Number: [phone] (home)     Last appt: 2023   Next appt: Visit date not found    Last Labs DM:   Lab Results   Component Value Date/Time    LABA1C 6.2 2022 09:34 AM

## 2023-06-20 ENCOUNTER — PATIENT MESSAGE (OUTPATIENT)
Dept: FAMILY MEDICINE CLINIC | Age: 33
End: 2023-06-20

## 2023-06-20 RX ORDER — FLUCONAZOLE 150 MG/1
150 TABLET ORAL ONCE
Qty: 1 TABLET | Refills: 0 | Status: SHIPPED | OUTPATIENT
Start: 2023-06-20 | End: 2023-06-20

## 2023-06-20 NOTE — TELEPHONE ENCOUNTER
From: Nelida Green  To: Dr. Kaelyn Valentine: 6/20/2023 11:48 AM EDT  Subject: Argelia Yung Means,    I am pretty sure I have a yeast infection. Its been a long time since Lane had one. Am I able to get a prescription sent over for the pill and cream? I wasnt sure if I need to reach out to you or Dr. Vinnie Tuttle. Thank you!    Nelida Green

## 2023-07-20 ENCOUNTER — TELEPHONE (OUTPATIENT)
Dept: PRIMARY CARE CLINIC | Age: 33
End: 2023-07-20

## 2023-07-20 NOTE — TELEPHONE ENCOUNTER
Submitted PA for Dexcom [employer] [facility] Key: [id number] STATUS: PENDING. Follow up done daily; if no response in three days we will refax for status check. If another three days goes by with no response we will call the insurance for status.

## 2023-07-24 NOTE — TELEPHONE ENCOUNTER
The medication is APPROVED. Coverage Start Date:07/20/2023; Coverage End Date:07/19/2024; If this requires a response please respond to the pool ( P [id number]). Thank you please advise patient.

## 2023-08-15 RX ORDER — PROCHLORPERAZINE 25 MG/1
SUPPOSITORY RECTAL
Qty: 3 EACH | Refills: 11 | Status: SHIPPED | OUTPATIENT
Start: 2023-08-15

## 2023-09-14 RX ORDER — PROCHLORPERAZINE 25 MG/1
SUPPOSITORY RECTAL
Qty: 1 EACH | Refills: 3 | Status: SHIPPED | OUTPATIENT
Start: 2023-09-14

## 2023-09-18 RX ORDER — FLUCONAZOLE 150 MG/1
TABLET ORAL
Qty: 1 TABLET | Refills: 0 | Status: SHIPPED | OUTPATIENT
Start: 2023-09-18

## 2023-10-28 DIAGNOSIS — Z79.4 CONTROLLED TYPE 2 DIABETES MELLITUS WITH COMPLICATION, WITH LONG-TERM CURRENT USE OF INSULIN (HCC): ICD-10-CM

## 2023-10-28 DIAGNOSIS — E11.8 CONTROLLED TYPE 2 DIABETES MELLITUS WITH COMPLICATION, WITH LONG-TERM CURRENT USE OF INSULIN (HCC): ICD-10-CM

## 2023-10-29 RX ORDER — ESCITALOPRAM OXALATE 20 MG/1
TABLET ORAL
Qty: 30 TABLET | Refills: 5 | Status: SHIPPED | OUTPATIENT
Start: 2023-10-29

## 2023-10-29 RX ORDER — BUSPIRONE HYDROCHLORIDE 7.5 MG/1
7.5 TABLET ORAL 2 TIMES DAILY
Qty: 60 TABLET | Refills: 5 | Status: SHIPPED | OUTPATIENT
Start: 2023-10-29

## 2023-10-29 RX ORDER — LISINOPRIL 10 MG/1
TABLET ORAL
Qty: 30 TABLET | Refills: 5 | Status: SHIPPED | OUTPATIENT
Start: 2023-10-29

## 2023-10-29 RX ORDER — SEMAGLUTIDE 2.68 MG/ML
INJECTION, SOLUTION SUBCUTANEOUS
Qty: 3 ML | Refills: 3 | Status: SHIPPED | OUTPATIENT
Start: 2023-10-29

## 2023-10-30 RX ORDER — INSULIN LISPRO 100 [IU]/ML
INJECTION, SOLUTION INTRAVENOUS; SUBCUTANEOUS
Qty: 1 ADJUSTABLE DOSE PRE-FILLED PEN SYRINGE | Refills: 0 | Status: SHIPPED | OUTPATIENT
Start: 2023-10-30

## 2023-10-30 RX ORDER — INSULIN DEGLUDEC 200 U/ML
INJECTION, SOLUTION SUBCUTANEOUS
Qty: 1 ADJUSTABLE DOSE PRE-FILLED PEN SYRINGE | Refills: 0 | Status: SHIPPED | OUTPATIENT
Start: 2023-10-30

## 2023-10-30 NOTE — TELEPHONE ENCOUNTER
Jardiance denied. Ordered 1 pen of each insulin with a note that patient needs an appt.      Medication:   Requested Prescriptions     Pending Prescriptions Disp Refills    insulin lispro, 1 Unit Dial, (HUMALOG KWIKPEN) 100 UNIT/ML SOPN 1 Adjustable Dose Pre-filled Pen Syringe 0     Si-26 units AC TID may have 1 pen- must make appt    Insulin Degludec (TRESIBA FLEXTOUCH) 200 UNIT/ML SOPN 1 Adjustable Dose Pre-filled Pen Syringe 0     Si units daily patient may have 1 pen- must make appt       Last Filled:      Patient Phone Number: [phone] (home)     Last appt: 2023   Next appt: Visit date not found    Last Labs DM:   Lab Results   Component Value Date/Time    LABA1C 6.2 2022 09:34 AM

## 2023-10-31 RX ORDER — NORGESTIMATE AND ETHINYL ESTRADIOL 0.25-0.035
1 KIT ORAL DAILY
Qty: 3 PACKET | Refills: 3 | Status: SHIPPED | OUTPATIENT
Start: 2023-10-31

## 2023-11-08 ENCOUNTER — TELEPHONE (OUTPATIENT)
Dept: ENDOCRINOLOGY | Age: 33
End: 2023-11-08

## 2023-11-08 DIAGNOSIS — E11.8 CONTROLLED TYPE 2 DIABETES MELLITUS WITH COMPLICATION, WITH LONG-TERM CURRENT USE OF INSULIN (HCC): ICD-10-CM

## 2023-11-08 DIAGNOSIS — Z79.4 CONTROLLED TYPE 2 DIABETES MELLITUS WITH COMPLICATION, WITH LONG-TERM CURRENT USE OF INSULIN (HCC): ICD-10-CM

## 2023-11-08 RX ORDER — INSULIN DEGLUDEC 200 U/ML
INJECTION, SOLUTION SUBCUTANEOUS
Qty: 5 ADJUSTABLE DOSE PRE-FILLED PEN SYRINGE | Refills: 0 | Status: SHIPPED | OUTPATIENT
Start: 2023-11-08 | End: 2023-11-09 | Stop reason: SDUPTHER

## 2023-11-08 RX ORDER — INSULIN LISPRO 100 [IU]/ML
INJECTION, SOLUTION INTRAVENOUS; SUBCUTANEOUS
Qty: 5 ADJUSTABLE DOSE PRE-FILLED PEN SYRINGE | Refills: 0 | Status: SHIPPED | OUTPATIENT
Start: 2023-11-08 | End: 2023-11-09 | Stop reason: SDUPTHER

## 2023-11-08 NOTE — TELEPHONE ENCOUNTER
Pt needs refills on rx           insulin lispro, 1 Unit Dial, (HUMALOG KWIKPEN) 100 UNIT/ML SOPN     Insulin Degludec (TRESIBA    FLEXTOUCH) 200 UNIT/ML SOPN         Semaglutide, 2 MG/DOSE, (OZEMPIC, 2 MG/DOSE,) 8 MG/3ML [id number] - [place], [address] [place] [address]   Phone:  [phone]  Fax:  [phone]        Last 1/24/2023    Next 2/8/2024

## 2023-11-08 NOTE — TELEPHONE ENCOUNTER
Medication:   Requested Prescriptions     Pending Prescriptions Disp Refills    Insulin Degludec (TRESIBA FLEXTOUCH) 200 UNIT/ML SOPN 5 Adjustable Dose Pre-filled Pen Syringe 0     Si units daily patient may have 1 pen- must make appt    insulin lispro, 1 Unit Dial, (HUMALOG KWIKPEN) 100 UNIT/ML SOPN 5 Adjustable Dose Pre-filled Pen Syringe 0     Si-26 units AC TID may have 1 pen- must make appt       Last Filled:      Patient Phone Number: [phone] (home)     Last appt: 2023   Next appt: 24    Last Labs DM:   Lab Results   Component Value Date/Time    LABA1C 6.2 2022 09:34 AM

## 2023-11-08 NOTE — TELEPHONE ENCOUNTER
Patient called requesting a refill, stated that the script was sent to wrong pharmacy she is using   [id number] - [place], [place] - [address] [facility] -  [phone] - F [phone]   [address] [facility] [address]   Phone:  [phone]  Fax:  [phone]    insulin lispro, 1 Unit Dial, (HUMALOG KWIKPEN) 100 UNIT/ML SOPN      Insulin Degludec (TRESIBA    FLEXTOUCH) 200 UNIT/ML SOPN            Semaglutide, 2 MG/DOSE, (OZEMPIC, 2 MG/DOSE,) 8 MG/3ML SOPN     Pt is wanting to know if the note pt needs to schedule an appt can be removed from the notes on her script      LOV- 1/24/23  NOV- 2/8/24    Please advise

## 2023-11-09 DIAGNOSIS — Z79.4 CONTROLLED TYPE 2 DIABETES MELLITUS WITH COMPLICATION, WITH LONG-TERM CURRENT USE OF INSULIN (HCC): ICD-10-CM

## 2023-11-09 DIAGNOSIS — E11.8 CONTROLLED TYPE 2 DIABETES MELLITUS WITH COMPLICATION, WITH LONG-TERM CURRENT USE OF INSULIN (HCC): ICD-10-CM

## 2023-11-09 RX ORDER — INSULIN LISPRO 100 [IU]/ML
INJECTION, SOLUTION INTRAVENOUS; SUBCUTANEOUS
Qty: 5 ADJUSTABLE DOSE PRE-FILLED PEN SYRINGE | Refills: 2 | Status: SHIPPED | OUTPATIENT
Start: 2023-11-09

## 2023-11-09 RX ORDER — INSULIN DEGLUDEC 200 U/ML
INJECTION, SOLUTION SUBCUTANEOUS
Qty: 5 ADJUSTABLE DOSE PRE-FILLED PEN SYRINGE | Refills: 3 | Status: SHIPPED | OUTPATIENT
Start: 2023-11-09

## 2023-12-02 DIAGNOSIS — E78.00 PURE HYPERCHOLESTEROLEMIA: Chronic | ICD-10-CM

## 2023-12-02 DIAGNOSIS — E11.8 CONTROLLED TYPE 2 DIABETES MELLITUS WITH COMPLICATION, WITH LONG-TERM CURRENT USE OF INSULIN (HCC): Chronic | ICD-10-CM

## 2023-12-02 DIAGNOSIS — Z79.4 CONTROLLED TYPE 2 DIABETES MELLITUS WITH COMPLICATION, WITH LONG-TERM CURRENT USE OF INSULIN (HCC): Chronic | ICD-10-CM

## 2023-12-02 LAB
ALBUMIN SERPL-MCNC: 4.2 G/DL (ref 3.4–5)
ALBUMIN/GLOB SERPL: 1.8 {RATIO} (ref 1.1–2.2)
ALP SERPL-CCNC: 93 U/L (ref 40–129)
ALT SERPL-CCNC: 12 U/L (ref 10–40)
ANION GAP SERPL CALCULATED.3IONS-SCNC: 11 MMOL/L (ref 3–16)
AST SERPL-CCNC: 12 U/L (ref 15–37)
BILIRUB SERPL-MCNC: 0.4 MG/DL (ref 0–1)
BUN SERPL-MCNC: 6 MG/DL (ref 7–20)
CALCIUM SERPL-MCNC: 8.8 MG/DL (ref 8.3–10.6)
CHLORIDE SERPL-SCNC: 96 MMOL/L (ref 99–110)
CHOLEST SERPL-MCNC: 192 MG/DL (ref 0–199)
CO2 SERPL-SCNC: 26 MMOL/L (ref 21–32)
CREAT SERPL-MCNC: <0.5 MG/DL (ref 0.6–1.1)
CREAT UR-MCNC: 99.5 MG/DL (ref 28–259)
DEPRECATED RDW RBC AUTO: 13 % (ref 12.4–15.4)
GFR SERPLBLD CREATININE-BSD FMLA CKD-EPI: >60 ML/MIN/{1.73_M2}
GLUCOSE SERPL-MCNC: 320 MG/DL (ref 70–99)
HCT VFR BLD AUTO: 40.5 % (ref 36–48)
HDLC SERPL-MCNC: 53 MG/DL (ref 40–60)
HGB BLD-MCNC: 13.8 G/DL (ref 12–16)
LDLC SERPL CALC-MCNC: 124 MG/DL
MCH RBC QN AUTO: 27.8 PG (ref 26–34)
MCHC RBC AUTO-ENTMCNC: 34.1 G/DL (ref 31–36)
MCV RBC AUTO: 81.5 FL (ref 80–100)
MICROALBUMIN UR DL<=1MG/L-MCNC: <1.2 MG/DL
MICROALBUMIN/CREAT UR: NORMAL MG/G (ref 0–30)
PLATELET # BLD AUTO: 321 K/UL (ref 135–450)
PMV BLD AUTO: 8.3 FL (ref 5–10.5)
POTASSIUM SERPL-SCNC: 4.1 MMOL/L (ref 3.5–5.1)
PROT SERPL-MCNC: 6.5 G/DL (ref 6.4–8.2)
RBC # BLD AUTO: 4.97 M/UL (ref 4–5.2)
SODIUM SERPL-SCNC: 133 MMOL/L (ref 136–145)
TRIGL SERPL-MCNC: 77 MG/DL (ref 0–150)
VLDLC SERPL CALC-MCNC: 15 MG/DL
WBC # BLD AUTO: 10.5 K/UL (ref 4–11)

## 2023-12-03 DIAGNOSIS — Z79.4 CONTROLLED TYPE 2 DIABETES MELLITUS WITH COMPLICATION, WITH LONG-TERM CURRENT USE OF INSULIN (HCC): Primary | ICD-10-CM

## 2023-12-03 DIAGNOSIS — E11.8 CONTROLLED TYPE 2 DIABETES MELLITUS WITH COMPLICATION, WITH LONG-TERM CURRENT USE OF INSULIN (HCC): Primary | ICD-10-CM

## 2023-12-04 DIAGNOSIS — E11.8 CONTROLLED TYPE 2 DIABETES MELLITUS WITH COMPLICATION, WITH LONG-TERM CURRENT USE OF INSULIN (HCC): ICD-10-CM

## 2023-12-04 DIAGNOSIS — Z79.4 CONTROLLED TYPE 2 DIABETES MELLITUS WITH COMPLICATION, WITH LONG-TERM CURRENT USE OF INSULIN (HCC): ICD-10-CM

## 2023-12-05 LAB
EST. AVERAGE GLUCOSE BLD GHB EST-MCNC: 237.4 MG/DL
HBA1C MFR BLD: 9.9 %

## 2023-12-26 RX ORDER — FLUCONAZOLE 150 MG/1
TABLET ORAL
Qty: 1 TABLET | Refills: 0 | Status: SHIPPED | OUTPATIENT
Start: 2023-12-26

## 2024-01-02 SDOH — ECONOMIC STABILITY: FOOD INSECURITY: WITHIN THE PAST 12 MONTHS, YOU WORRIED THAT YOUR FOOD WOULD RUN OUT BEFORE YOU GOT MONEY TO BUY MORE.: NEVER TRUE

## 2024-01-02 SDOH — ECONOMIC STABILITY: INCOME INSECURITY: HOW HARD IS IT FOR YOU TO PAY FOR THE VERY BASICS LIKE FOOD, HOUSING, MEDICAL CARE, AND HEATING?: NOT VERY HARD

## 2024-01-02 SDOH — ECONOMIC STABILITY: HOUSING INSECURITY
IN THE LAST 12 MONTHS, WAS THERE A TIME WHEN YOU DID NOT HAVE A STEADY PLACE TO SLEEP OR SLEPT IN A SHELTER (INCLUDING NOW)?: NO

## 2024-01-02 SDOH — ECONOMIC STABILITY: FOOD INSECURITY: WITHIN THE PAST 12 MONTHS, THE FOOD YOU BOUGHT JUST DIDN'T LAST AND YOU DIDN'T HAVE MONEY TO GET MORE.: NEVER TRUE

## 2024-01-05 ENCOUNTER — OFFICE VISIT (OUTPATIENT)
Dept: FAMILY MEDICINE CLINIC | Age: 34
End: 2024-01-05
Payer: COMMERCIAL

## 2024-01-05 DIAGNOSIS — R11.0 NAUSEA: ICD-10-CM

## 2024-01-05 DIAGNOSIS — Z79.4 CONTROLLED TYPE 2 DIABETES MELLITUS WITH COMPLICATION, WITH LONG-TERM CURRENT USE OF INSULIN (HCC): ICD-10-CM

## 2024-01-05 DIAGNOSIS — E66.01 MORBID OBESITY WITH BMI OF 70 AND OVER, ADULT (HCC): ICD-10-CM

## 2024-01-05 DIAGNOSIS — E11.8 CONTROLLED TYPE 2 DIABETES MELLITUS WITH COMPLICATION, WITH LONG-TERM CURRENT USE OF INSULIN (HCC): ICD-10-CM

## 2024-01-05 DIAGNOSIS — F41.8 DEPRESSION WITH ANXIETY: ICD-10-CM

## 2024-01-05 DIAGNOSIS — F43.9 STRESS: ICD-10-CM

## 2024-01-05 DIAGNOSIS — R03.0 ELEVATED BLOOD PRESSURE READING: ICD-10-CM

## 2024-01-05 DIAGNOSIS — G47.33 OBSTRUCTIVE SLEEP APNEA: Primary | ICD-10-CM

## 2024-01-05 DIAGNOSIS — E78.00 PURE HYPERCHOLESTEROLEMIA: ICD-10-CM

## 2024-01-05 PROCEDURE — 99214 OFFICE O/P EST MOD 30 MIN: CPT | Performed by: FAMILY MEDICINE

## 2024-01-05 RX ORDER — PROCHLORPERAZINE 25 MG/1
SUPPOSITORY RECTAL
Qty: 3 EACH | Refills: 11 | Status: SHIPPED | OUTPATIENT
Start: 2024-01-05

## 2024-01-05 RX ORDER — INSULIN LISPRO 100 [IU]/ML
INJECTION, SOLUTION INTRAVENOUS; SUBCUTANEOUS
Qty: 5 ADJUSTABLE DOSE PRE-FILLED PEN SYRINGE | Refills: 2 | Status: SHIPPED | OUTPATIENT
Start: 2024-01-05

## 2024-01-05 RX ORDER — ALBUTEROL SULFATE 90 UG/1
2 AEROSOL, METERED RESPIRATORY (INHALATION) EVERY 6 HOURS PRN
Qty: 1 EACH | Refills: 1 | Status: SHIPPED | OUTPATIENT
Start: 2024-01-05

## 2024-01-05 RX ORDER — SEMAGLUTIDE 2.68 MG/ML
INJECTION, SOLUTION SUBCUTANEOUS
Qty: 3 ML | Refills: 3 | Status: SHIPPED | OUTPATIENT
Start: 2024-01-05

## 2024-01-05 RX ORDER — PROCHLORPERAZINE 25 MG/1
SUPPOSITORY RECTAL
Qty: 1 EACH | Refills: 3 | Status: SHIPPED | OUTPATIENT
Start: 2024-01-05

## 2024-01-05 RX ORDER — ONDANSETRON 4 MG/1
4 TABLET, FILM COATED ORAL EVERY 8 HOURS PRN
Qty: 30 TABLET | Refills: 2 | Status: SHIPPED | OUTPATIENT
Start: 2024-01-05

## 2024-01-05 ASSESSMENT — PATIENT HEALTH QUESTIONNAIRE - PHQ9
8. MOVING OR SPEAKING SO SLOWLY THAT OTHER PEOPLE COULD HAVE NOTICED. OR THE OPPOSITE, BEING SO FIGETY OR RESTLESS THAT YOU HAVE BEEN MOVING AROUND A LOT MORE THAN USUAL: 0
3. TROUBLE FALLING OR STAYING ASLEEP: 0
4. FEELING TIRED OR HAVING LITTLE ENERGY: 0
SUM OF ALL RESPONSES TO PHQ9 QUESTIONS 1 & 2: 0
6. FEELING BAD ABOUT YOURSELF - OR THAT YOU ARE A FAILURE OR HAVE LET YOURSELF OR YOUR FAMILY DOWN: 0
9. THOUGHTS THAT YOU WOULD BE BETTER OFF DEAD, OR OF HURTING YOURSELF: 0
7. TROUBLE CONCENTRATING ON THINGS, SUCH AS READING THE NEWSPAPER OR WATCHING TELEVISION: 0
2. FEELING DOWN, DEPRESSED OR HOPELESS: 0
1. LITTLE INTEREST OR PLEASURE IN DOING THINGS: 0
SUM OF ALL RESPONSES TO PHQ QUESTIONS 1-9: 0
SUM OF ALL RESPONSES TO PHQ QUESTIONS 1-9: 0
5. POOR APPETITE OR OVEREATING: 0
SUM OF ALL RESPONSES TO PHQ QUESTIONS 1-9: 0
10. IF YOU CHECKED OFF ANY PROBLEMS, HOW DIFFICULT HAVE THESE PROBLEMS MADE IT FOR YOU TO DO YOUR WORK, TAKE CARE OF THINGS AT HOME, OR GET ALONG WITH OTHER PEOPLE: 0
SUM OF ALL RESPONSES TO PHQ QUESTIONS 1-9: 0

## 2024-01-05 NOTE — PROGRESS NOTES
Here for f/u, pt states that things are doing well.  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  Pt is living in [place], [place] about [place] min outside of [place].  Work is downtown, and has a 45 min commute.  Pt does enjoy the work but the work is stressful.  Pt is managing the stress, but doesn't leave, watch a lot of TV.  Pt spends a lot of time at home.      Pt was not surprised by her A1c, is up at 9.9 and she relates to not being as adherent to her meds, and also her lifestyle.  Pt is interested in looking at getting insulin pump, as she struggles to remember to take meds.  She relates that to stress.  She is back on the adherence to her meds, but with some nausea with restarting her meds.  Pt is not checking blood sugars as she has been ahving issues with CGM.  Pt has noted some nausea, relates to getting back on meds.      With her high levels of stress, she has not been doing any counseling.          Except as noted above in the history of present illness, the review of systems is  negative for headache, vision changes, chest pain, shortness of breath, abdominal pain, urinary sx, bowel changes.    Past medical, surgical, and social history reviewed and updated  Medications and allergies reviewed and updated        O: /68   Pulse (!) 121   Temp 97.8 °F (36.6 °C) (Temporal)   Resp 18   Wt (!) 185 kg (407 lb 12.8 oz)   SpO2 95%   BMI 73.40 kg/m²   GEN: No acute distress, cooperative, well nourished, alert.   HEENT: PEERLA, EOMI , normocephalic/atraumatic, nares and oropharynx clear.  Mucous membranes normal, Tympanic membranes clear bilaterally.  Neck: soft, supple, no thyromegaly, mass, no Lymphadenopathy  CV: Regular rate and rhythm, no murmur, rubs, gallops. No edema.  Resp: Clear to auscultation bilaterally good air entry bilaterally  No crackles, wheeze. Breathing comfortably.   Psych: mood stable, No suicidal thoughts or ideation       Orders Only on

## 2024-01-07 VITALS
WEIGHT: 293 LBS | BODY MASS INDEX: 73.4 KG/M2 | TEMPERATURE: 97.8 F | SYSTOLIC BLOOD PRESSURE: 132 MMHG | DIASTOLIC BLOOD PRESSURE: 68 MMHG | RESPIRATION RATE: 18 BRPM | HEART RATE: 121 BPM | OXYGEN SATURATION: 95 %

## 2024-02-23 DIAGNOSIS — Z79.4 CONTROLLED TYPE 2 DIABETES MELLITUS WITH COMPLICATION, WITH LONG-TERM CURRENT USE OF INSULIN (HCC): ICD-10-CM

## 2024-02-23 DIAGNOSIS — E11.8 CONTROLLED TYPE 2 DIABETES MELLITUS WITH COMPLICATION, WITH LONG-TERM CURRENT USE OF INSULIN (HCC): ICD-10-CM

## 2024-02-26 RX ORDER — EMPAGLIFLOZIN 10 MG/1
10 TABLET, FILM COATED ORAL DAILY
Qty: 90 TABLET | Refills: 1 | Status: SHIPPED | OUTPATIENT
Start: 2024-02-26

## 2024-02-26 NOTE — TELEPHONE ENCOUNTER
Medication:   Requested Prescriptions     Pending Prescriptions Disp Refills    JARDIANCE 10 MG tablet [Pharmacy Med Name: JARDIANCE 10MG TABLETS] 90 tablet 1     Sig: TAKE 1 TABLET BY MOUTH DAILY       Last Filled:      Patient Phone Number: [phone] (home)     Last appt: 1/24/2023   Next appt: 04/08/2024    Last Labs DM:   Lab Results   Component Value Date/Time    LABA1C 9.9 12/02/2023 11:11 AM

## 2024-04-03 ENCOUNTER — PATIENT MESSAGE (OUTPATIENT)
Dept: FAMILY MEDICINE CLINIC | Age: 34
End: 2024-04-03

## 2024-04-03 DIAGNOSIS — E11.8 CONTROLLED TYPE 2 DIABETES MELLITUS WITH COMPLICATION, WITH LONG-TERM CURRENT USE OF INSULIN (HCC): Primary | ICD-10-CM

## 2024-04-03 DIAGNOSIS — Z79.4 CONTROLLED TYPE 2 DIABETES MELLITUS WITH COMPLICATION, WITH LONG-TERM CURRENT USE OF INSULIN (HCC): Primary | ICD-10-CM

## 2024-05-22 ENCOUNTER — OFFICE VISIT (OUTPATIENT)
Dept: FAMILY MEDICINE CLINIC | Age: 34
End: 2024-05-22
Payer: COMMERCIAL

## 2024-05-22 VITALS
WEIGHT: 293 LBS | TEMPERATURE: 97.7 F | BODY MASS INDEX: 51.91 KG/M2 | DIASTOLIC BLOOD PRESSURE: 62 MMHG | RESPIRATION RATE: 14 BRPM | HEIGHT: 63 IN | OXYGEN SATURATION: 95 % | HEART RATE: 105 BPM | SYSTOLIC BLOOD PRESSURE: 134 MMHG

## 2024-05-22 DIAGNOSIS — E11.9 TYPE 2 DIABETES MELLITUS WITHOUT COMPLICATION, WITH LONG-TERM CURRENT USE OF INSULIN (HCC): ICD-10-CM

## 2024-05-22 DIAGNOSIS — Z79.4 TYPE 2 DIABETES MELLITUS WITHOUT COMPLICATION, WITH LONG-TERM CURRENT USE OF INSULIN (HCC): ICD-10-CM

## 2024-05-22 DIAGNOSIS — G47.33 OSA (OBSTRUCTIVE SLEEP APNEA): ICD-10-CM

## 2024-05-22 DIAGNOSIS — F41.8 DEPRESSION WITH ANXIETY: Primary | Chronic | ICD-10-CM

## 2024-05-22 DIAGNOSIS — E55.9 VITAMIN D DEFICIENCY: ICD-10-CM

## 2024-05-22 DIAGNOSIS — E78.00 PURE HYPERCHOLESTEROLEMIA: Chronic | ICD-10-CM

## 2024-05-22 DIAGNOSIS — E66.01 MORBID OBESITY WITH BMI OF 70 AND OVER, ADULT (HCC): Chronic | ICD-10-CM

## 2024-05-22 LAB
25(OH)D3 SERPL-MCNC: 17.6 NG/ML
ALBUMIN SERPL-MCNC: 4.4 G/DL (ref 3.4–5)
ALBUMIN/GLOB SERPL: 1.6 {RATIO} (ref 1.1–2.2)
ALP SERPL-CCNC: 96 U/L (ref 40–129)
ALT SERPL-CCNC: 15 U/L (ref 10–40)
ANION GAP SERPL CALCULATED.3IONS-SCNC: 17 MMOL/L (ref 3–16)
AST SERPL-CCNC: 13 U/L (ref 15–37)
BILIRUB SERPL-MCNC: 0.7 MG/DL (ref 0–1)
BUN SERPL-MCNC: 6 MG/DL (ref 7–20)
CALCIUM SERPL-MCNC: 9.1 MG/DL (ref 8.3–10.6)
CHLORIDE SERPL-SCNC: 97 MMOL/L (ref 99–110)
CHOLEST SERPL-MCNC: 235 MG/DL (ref 0–199)
CO2 SERPL-SCNC: 22 MMOL/L (ref 21–32)
CREAT SERPL-MCNC: <0.5 MG/DL (ref 0.6–1.1)
GFR SERPLBLD CREATININE-BSD FMLA CKD-EPI: >90 ML/MIN/{1.73_M2}
GLUCOSE SERPL-MCNC: 315 MG/DL (ref 70–99)
HDLC SERPL-MCNC: 52 MG/DL (ref 40–60)
LDLC SERPL CALC-MCNC: 155 MG/DL
POTASSIUM SERPL-SCNC: 4.2 MMOL/L (ref 3.5–5.1)
PROT SERPL-MCNC: 7.1 G/DL (ref 6.4–8.2)
SODIUM SERPL-SCNC: 136 MMOL/L (ref 136–145)
TRIGL SERPL-MCNC: 139 MG/DL (ref 0–150)
VLDLC SERPL CALC-MCNC: 28 MG/DL

## 2024-05-22 PROCEDURE — G2211 COMPLEX E/M VISIT ADD ON: HCPCS | Performed by: FAMILY MEDICINE

## 2024-05-22 PROCEDURE — 99214 OFFICE O/P EST MOD 30 MIN: CPT | Performed by: FAMILY MEDICINE

## 2024-05-22 RX ORDER — LISINOPRIL 10 MG/1
TABLET ORAL
Qty: 30 TABLET | Refills: 5 | Status: SHIPPED | OUTPATIENT
Start: 2024-05-22

## 2024-05-22 RX ORDER — MONTELUKAST SODIUM 10 MG/1
10 TABLET ORAL NIGHTLY
Qty: 30 TABLET | Refills: 5 | Status: SHIPPED | OUTPATIENT
Start: 2024-05-22

## 2024-05-22 RX ORDER — SEMAGLUTIDE 2.68 MG/ML
INJECTION, SOLUTION SUBCUTANEOUS
Qty: 3 ML | Refills: 3 | Status: SHIPPED | OUTPATIENT
Start: 2024-05-22 | End: 2024-05-23 | Stop reason: SDUPTHER

## 2024-05-22 RX ORDER — BUSPIRONE HYDROCHLORIDE 7.5 MG/1
7.5 TABLET ORAL 2 TIMES DAILY
Qty: 60 TABLET | Refills: 5 | Status: SHIPPED | OUTPATIENT
Start: 2024-05-22

## 2024-05-22 RX ORDER — BUPROPION HYDROCHLORIDE 150 MG/1
150 TABLET ORAL EVERY MORNING
Qty: 30 TABLET | Refills: 5 | Status: SHIPPED | OUTPATIENT
Start: 2024-05-22

## 2024-05-22 RX ORDER — ONDANSETRON 4 MG/1
4 TABLET, FILM COATED ORAL EVERY 8 HOURS PRN
Qty: 30 TABLET | Refills: 2 | Status: SHIPPED | OUTPATIENT
Start: 2024-05-22

## 2024-05-22 NOTE — PROGRESS NOTES
clear bilaterally.  Neck: soft, supple, no thyromegaly, mass, no Lymphadenopathy  CV: Regular rate and rhythm, no murmur, rubs, gallops. No edema.  Resp: Clear to auscultation bilaterally good air entry bilaterally  No crackles, wheeze. Breathing comfortably.   Psych: mood stable, No suicidal thoughts or ideation   mild dysthyhmia, denies any ST/SI      Current Outpatient Medications   Medication Sig Dispense Refill    busPIRone (BUSPAR) 7.5 MG tablet Take 1 tablet by mouth 2 times daily 60 tablet 5    lisinopril (PRINIVIL;ZESTRIL) 10 MG tablet TAKE ONE TABLET BY MOUTH DAILY 30 tablet 5    montelukast (SINGULAIR) 10 MG tablet Take 1 tablet by mouth nightly 30 tablet 5    ondansetron (ZOFRAN) 4 MG tablet Take 1 tablet by mouth every 8 hours as needed for Nausea or Vomiting 30 tablet 2    semaglutide, 2 MG/DOSE, (OZEMPIC, 2 MG/DOSE,) 8 MG/3ML SOPN sc injection 2mg SC weekly 3 mL 3    buPROPion (WELLBUTRIN XL) 150 MG extended release tablet Take 1 tablet by mouth every morning 30 tablet 5    JARDIANCE 10 MG tablet TAKE 1 TABLET BY MOUTH DAILY 90 tablet 1    albuterol sulfate HFA (PROVENTIL;VENTOLIN;PROAIR) 108 (90 Base) MCG/ACT inhaler Inhale 2 puffs into the lungs every 6 hours as needed for Wheezing 1 each 1    insulin lispro, 1 Unit Dial, (HUMALOG KWIKPEN) 100 UNIT/ML SOPN 22-26 units AC TID 5 Adjustable Dose Pre-filled Pen Syringe 2    Continuous Blood Gluc Transmit (DEXCOM G6 TRANSMITTER) MISC USE FOR CONTINOUS BLOOD GLUCOSE MONITORING. REPLACE TRANSMITTER EVERY 90 DAYS 1 each 3    Continuous Blood Gluc Sensor (DEXCOM G6 SENSOR) [facility] REPLACE SENSOR EVERY 10 DAYS 3 each 11    fluconazole (DIFLUCAN) 150 MG tablet TAKE 1 TABLET BY MOUTH 1 TIME FOR 1 DOSE 1 tablet 0    Insulin Degludec (TRESIBA FLEXTOUCH) 200 UNIT/ML SOPN 136 units daily 5 Adjustable Dose Pre-filled Pen Syringe 3    norgestimate-ethinyl estradiol (ORTHO-CYCLEN, 28,) 0.25-35 MG-MCG per tablet Take 1 tablet by mouth daily 3 packet 3    escitalopram

## 2024-05-23 ENCOUNTER — OFFICE VISIT (OUTPATIENT)
Dept: GYNECOLOGY | Age: 34
End: 2024-05-23
Payer: COMMERCIAL

## 2024-05-23 ENCOUNTER — OFFICE VISIT (OUTPATIENT)
Dept: ENDOCRINOLOGY | Age: 34
End: 2024-05-23
Payer: COMMERCIAL

## 2024-05-23 VITALS
OXYGEN SATURATION: 99 % | HEIGHT: 63 IN | DIASTOLIC BLOOD PRESSURE: 84 MMHG | WEIGHT: 293 LBS | BODY MASS INDEX: 51.91 KG/M2 | RESPIRATION RATE: 17 BRPM | SYSTOLIC BLOOD PRESSURE: 126 MMHG | HEART RATE: 97 BPM

## 2024-05-23 VITALS
SYSTOLIC BLOOD PRESSURE: 110 MMHG | HEART RATE: 72 BPM | BODY MASS INDEX: 51.91 KG/M2 | WEIGHT: 293 LBS | DIASTOLIC BLOOD PRESSURE: 70 MMHG | HEIGHT: 63 IN | RESPIRATION RATE: 17 BRPM

## 2024-05-23 DIAGNOSIS — Z01.419 WELL WOMAN EXAM WITH ROUTINE GYNECOLOGICAL EXAM: Primary | ICD-10-CM

## 2024-05-23 DIAGNOSIS — E11.65 UNCONTROLLED TYPE 2 DIABETES MELLITUS WITH HYPERGLYCEMIA (HCC): Primary | ICD-10-CM

## 2024-05-23 LAB
EST. AVERAGE GLUCOSE BLD GHB EST-MCNC: 314.9 MG/DL
HBA1C MFR BLD: 12.6 %

## 2024-05-23 PROCEDURE — 99214 OFFICE O/P EST MOD 30 MIN: CPT | Performed by: INTERNAL MEDICINE

## 2024-05-23 PROCEDURE — 99395 PREV VISIT EST AGE 18-39: CPT | Performed by: OBSTETRICS & GYNECOLOGY

## 2024-05-23 RX ORDER — FLUCONAZOLE 150 MG/1
150 TABLET ORAL ONCE
Qty: 3 TABLET | Refills: 3 | Status: SHIPPED | OUTPATIENT
Start: 2024-05-23 | End: 2024-05-23

## 2024-05-23 RX ORDER — BLOOD SUGAR DIAGNOSTIC
STRIP MISCELLANEOUS
Qty: 100 STRIP | Refills: 6 | Status: SHIPPED | OUTPATIENT
Start: 2024-05-23

## 2024-05-23 RX ORDER — LANCETS 28 GAUGE
1 EACH MISCELLANEOUS DAILY
Qty: 100 EACH | Refills: 3 | Status: SHIPPED | OUTPATIENT
Start: 2024-05-23

## 2024-05-23 RX ORDER — SEMAGLUTIDE 2.68 MG/ML
INJECTION, SOLUTION SUBCUTANEOUS
Qty: 3 ML | Refills: 3 | Status: SHIPPED | OUTPATIENT
Start: 2024-05-23

## 2024-05-23 RX ORDER — BLOOD-GLUCOSE METER
KIT MISCELLANEOUS
Qty: 1 EACH | Refills: 0 | Status: SHIPPED | OUTPATIENT
Start: 2024-05-23

## 2024-05-23 RX ORDER — BLOOD-GLUCOSE SENSOR
EACH MISCELLANEOUS
Qty: 2 EACH | Refills: 4 | Status: SHIPPED | OUTPATIENT
Start: 2024-05-23

## 2024-05-23 RX ORDER — NORGESTIMATE AND ETHINYL ESTRADIOL 0.25-0.035
1 KIT ORAL DAILY
Qty: 3 PACKET | Refills: 3 | Status: SHIPPED | OUTPATIENT
Start: 2024-05-23

## 2024-05-23 RX ORDER — PROCHLORPERAZINE 25 MG/1
SUPPOSITORY RECTAL
Qty: 3 EACH | Refills: 11 | Status: CANCELLED | OUTPATIENT
Start: 2024-05-23

## 2024-05-23 RX ORDER — INSULIN DEGLUDEC 200 U/ML
INJECTION, SOLUTION SUBCUTANEOUS
Qty: 5 ADJUSTABLE DOSE PRE-FILLED PEN SYRINGE | Refills: 3 | Status: SHIPPED | OUTPATIENT
Start: 2024-05-23

## 2024-05-23 NOTE — PROGRESS NOTES
insulin. Discussed weight loss, diet changes, she is on GLP-1 agonist.    Switch CGM to paris 3 as difficulty with Dexcom.  May need U-500 but weight gain can be an issue   No plan for pregnancy. Discussed risk with uncontrolled DM  Continue SGLT-2 inhibitor  A1c pending. Will adjust insulin dose and advise to send log every 2-3 weeks  2.HLD : LDL above target, recommend statin  3.Obesity: Discussed weight loss, advise 1500 Kcal diet. Discussed bariatric surgery, she is seeing Dr. Price, was on Qsymia  Switched trulicity to ozempic and assess weight loss  4.PCOS: On OCP  5.Vit D deficiency: Repeat improved    Plan:      Tresiba 136 units   Novolog  24---> Humalog U-200   26 units TID   SSI 2 for 50 >150   Ozempic 2mg   Advise to check blood sugar 4 times a day   Patient to send blood sugar log for titration.   Advise to exercise regularly. Advise to low simple carbohydrate and protein with each  meal diet.    Diabetes Care: recommend yearly eye exam, foot exam and urine microalbumin to   creatinine ratio.

## 2024-05-25 LAB
COLLECT DURATION TIME SPEC: 24 H
SPECIMEN VOL ?TM UR: 2900 ML

## 2024-05-28 LAB
COLLECT DURATION TIME SPEC: 24 H
CORTIS F 24H UR HPLC-MCNC: 4.87 UG/L
CORTIS F 24H UR-MRATE: 14.1 UG/D
CORTIS F/CREAT 24H UR: 10.82 UG/G CRT
CREAT 24H UR-MCNC: 45 MG/DL
CREAT 24H UR-MRATE: 1305 MG/D (ref 700–1600)
IMP & REVIEW OF LAB RESULTS: NORMAL
SPECIMEN VOL ?TM UR: 2900 ML

## 2024-05-28 ASSESSMENT — ENCOUNTER SYMPTOMS
EYES NEGATIVE: 1
GASTROINTESTINAL NEGATIVE: 1
RESPIRATORY NEGATIVE: 1
ALLERGIC/IMMUNOLOGIC NEGATIVE: 1

## 2024-05-29 NOTE — PROGRESS NOTES
Subjective   Patient ID: Tiffani Melgoza is a 33 y.o. female.    Patient is here for annual.     Gynecologic Exam        Review of Systems   Constitutional: Negative.    HENT: Negative.     Eyes: Negative.    Respiratory: Negative.     Cardiovascular: Negative.    Gastrointestinal: Negative.    Endocrine: Negative.    Genitourinary: Negative.    Musculoskeletal: Negative.    Skin: Negative.    Allergic/Immunologic: Negative.    Neurological: Negative.    Hematological: Negative.    Psychiatric/Behavioral: Negative.       Date of Birth 1990  Past Medical History:   Diagnosis Date    Acne     Allergic rhinitis     Anxiety     Asthma     Cellulitis     on back/ on bactrim for/ pcp aware    COVID-19 2020    Depression     Diabetes mellitus type 1 (HCC)     Folic acid deficiency     Hyperlipidemia     Morbid obesity with BMI of 70 and over, adult ([facility])     Obesity     Obstructive sleep apnea 2011    TAY (obstructive sleep apnea)     PCOS (polycystic ovarian syndrome)     Pre-operative clearance     Pyelonephritis     left    Vitamin D deficiency      Past Surgical History:   Procedure Laterality Date    CHOLECYSTECTOMY  1/15/2013    MULTIPORT ROBOTIC ASSISTED LAPAROSCOPIC CHOLECYSTECTOMY    CYST REMOVAL      OTHER SURGICAL HISTORY      Cyst/Mass excised from posterior neck    OTHER SURGICAL HISTORY  12/15    excision back lesion-Dr. Gamez    WISDOM TOOTH EXTRACTION       OB History    Para Term  AB Living   0 0 0 0 0 0   SAB IAB Ectopic Molar Multiple Live Births   0 0 0 0 0 0     Social History     Socioeconomic History    Marital status: Single     Spouse name: Not on file    Number of children: Not on file    Years of education: Not on file    Highest education level: Not on file   Occupational History    Not on file   Tobacco Use    Smoking status: Never    Smokeless tobacco: Never   Vaping Use    Vaping Use: Never used   Substance and Sexual Activity    Alcohol use: Yes

## 2024-06-07 ENCOUNTER — PATIENT MESSAGE (OUTPATIENT)
Dept: ENDOCRINOLOGY | Age: 34
End: 2024-06-07

## 2024-06-10 NOTE — TELEPHONE ENCOUNTER
From: Tiffani Melgoza  To: Dr. Hay Grande  Sent: 6/7/2024 8:07 PM EDT  Subject: Sugar Log    Joan    I have the freestyle, Allison 3 now and I’m trying to make sure you have access to see it so you can see my blood sugars. Can you confirm you have that access?

## 2024-08-02 ENCOUNTER — TELEMEDICINE (OUTPATIENT)
Dept: ENDOCRINOLOGY | Age: 34
End: 2024-08-02

## 2024-08-02 DIAGNOSIS — E11.65 UNCONTROLLED TYPE 2 DIABETES MELLITUS WITH HYPERGLYCEMIA (HCC): Primary | ICD-10-CM

## 2024-08-02 RX ORDER — BLOOD-GLUCOSE SENSOR
EACH MISCELLANEOUS
Qty: 2 EACH | Refills: 4 | Status: SHIPPED | OUTPATIENT
Start: 2024-08-02

## 2024-08-02 RX ORDER — SEMAGLUTIDE 2.68 MG/ML
INJECTION, SOLUTION SUBCUTANEOUS
Qty: 3 ML | Refills: 3 | Status: SHIPPED | OUTPATIENT
Start: 2024-08-02

## 2024-08-02 RX ORDER — INSULIN DEGLUDEC 200 U/ML
INJECTION, SOLUTION SUBCUTANEOUS
Qty: 5 ADJUSTABLE DOSE PRE-FILLED PEN SYRINGE | Refills: 3 | Status: SHIPPED | OUTPATIENT
Start: 2024-08-02

## 2024-08-02 NOTE — PROGRESS NOTES
meal diet.    Diabetes Care: recommend yearly eye exam, foot exam and urine microalbumin to   creatinine ratio.

## 2024-08-12 ENCOUNTER — OFFICE VISIT (OUTPATIENT)
Dept: FAMILY MEDICINE CLINIC | Age: 34
End: 2024-08-12
Payer: COMMERCIAL

## 2024-08-12 VITALS
TEMPERATURE: 96.8 F | HEART RATE: 87 BPM | SYSTOLIC BLOOD PRESSURE: 126 MMHG | WEIGHT: 293 LBS | HEIGHT: 63 IN | DIASTOLIC BLOOD PRESSURE: 84 MMHG | BODY MASS INDEX: 51.91 KG/M2 | OXYGEN SATURATION: 97 %

## 2024-08-12 DIAGNOSIS — E11.65 UNCONTROLLED TYPE 2 DIABETES MELLITUS WITH HYPERGLYCEMIA (HCC): ICD-10-CM

## 2024-08-12 DIAGNOSIS — F41.8 DEPRESSION WITH ANXIETY: Chronic | ICD-10-CM

## 2024-08-12 DIAGNOSIS — G47.33 OBSTRUCTIVE SLEEP APNEA: Primary | Chronic | ICD-10-CM

## 2024-08-12 DIAGNOSIS — G47.33 OSA (OBSTRUCTIVE SLEEP APNEA): ICD-10-CM

## 2024-08-12 DIAGNOSIS — E11.9 TYPE 2 DIABETES MELLITUS WITHOUT COMPLICATION, WITH LONG-TERM CURRENT USE OF INSULIN (HCC): ICD-10-CM

## 2024-08-12 DIAGNOSIS — E78.00 PURE HYPERCHOLESTEROLEMIA: Chronic | ICD-10-CM

## 2024-08-12 DIAGNOSIS — R11.0 CHRONIC NAUSEA: ICD-10-CM

## 2024-08-12 DIAGNOSIS — Z79.4 TYPE 2 DIABETES MELLITUS WITHOUT COMPLICATION, WITH LONG-TERM CURRENT USE OF INSULIN (HCC): ICD-10-CM

## 2024-08-12 LAB
EST. AVERAGE GLUCOSE BLD GHB EST-MCNC: 159.9 MG/DL
HBA1C MFR BLD: 7.2 %

## 2024-08-12 PROCEDURE — 99214 OFFICE O/P EST MOD 30 MIN: CPT | Performed by: FAMILY MEDICINE

## 2024-08-12 PROCEDURE — G2211 COMPLEX E/M VISIT ADD ON: HCPCS | Performed by: FAMILY MEDICINE

## 2024-08-12 PROCEDURE — 3046F HEMOGLOBIN A1C LEVEL >9.0%: CPT | Performed by: FAMILY MEDICINE

## 2024-08-12 RX ORDER — ONDANSETRON 4 MG/1
4 TABLET, FILM COATED ORAL EVERY 8 HOURS PRN
Qty: 30 TABLET | Refills: 2 | Status: SHIPPED | OUTPATIENT
Start: 2024-08-12 | End: 2024-08-12 | Stop reason: SDUPTHER

## 2024-08-12 RX ORDER — ONDANSETRON 4 MG/1
4 TABLET, FILM COATED ORAL EVERY 8 HOURS PRN
Qty: 90 TABLET | Refills: 2 | Status: SHIPPED | OUTPATIENT
Start: 2024-08-12

## 2024-08-12 RX ORDER — BUPROPION HYDROCHLORIDE 300 MG/1
300 TABLET ORAL EVERY MORNING
Qty: 30 TABLET | Refills: 5 | Status: SHIPPED | OUTPATIENT
Start: 2024-08-12

## 2024-08-12 NOTE — PROGRESS NOTES
edema.  Resp: Clear to auscultation bilaterally good air entry bilaterally  No crackles, wheeze. Breathing comfortably.   Psych: mood stable, No suicidal thoughts or ideation       Wt Readings from Last 3 Encounters:   08/12/24 (!) 192.8 kg (425 lb)   05/23/24 (!) 189.7 kg (418 lb 4 oz)   05/23/24 (!) 188.2 kg (415 lb)           ASSESSMENT / PLAN:    1. Obstructive sleep apnea  Has not been adherent to use of mask  Will resume use  Encouraged f/u with pulmonary    2. TAY (obstructive sleep apnea)  Has not been adherent to use of mask  Will resume use  Encouraged f/u with pulmonary    3. Type 2 diabetes mellitus without complication, with long-term current use of insulin (HCC)  Sig improved blood sugars, reviewed CGM  Very happy with numbers  Check f/u bloodwork and urine as below  - Hemoglobin A1C; Future  - Comprehensive Metabolic Panel; Future  - Microalbumin / Creatinine Urine Ratio; Future  - TSH; Future  - Lipid Panel; Future    4. Depression with anxiety  Mood seems improved, with some mild persistent dysthymia  No suicidal thoughts or ideation  Discussed tx options.   Increase wellbutrin XL to 300mg qd  Consider f/u with psychology  Discussed use, benefit, and side effects of prescribed medications.  Barriers to medication compliance addressed.  All patient questions answered.  Pt voiced understanding.     5. Pure hypercholesterolemia  Check cmp, lipids  Cont to monitor, consider statin therapy  - Lipid Panel; Future    6. Chronic nausea  D/t GLP therapy  Rx given for zofran           Follow-up appointment:   3 months  pEnding bloodwork  Prn     Discussed use, benefit, and side effects of all prescribed medications.  Barriers to medication compliance addressed.  All patient questions answered.  Pt voiced understanding.  When applicable, patient's outside records were reviewed through Care Everywhere.  The patient has signed appropriate paperworks/consents.

## 2024-12-06 RX ORDER — ESCITALOPRAM OXALATE 20 MG/1
TABLET ORAL
Qty: 30 TABLET | Refills: 5 | Status: SHIPPED | OUTPATIENT
Start: 2024-12-06

## 2024-12-06 NOTE — TELEPHONE ENCOUNTER
Future Appointments   Date Time Provider Department Center   12/20/2024  9:30 AM Hay Grande MD Kenwo Endo [facility] [place] [facility]   1/17/2025  2:00 PM Tejas Mccollum MD [facility]   5/27/2025  1:30 PM Valery Mason MD [facility] 8/12/2024       Requested Prescriptions     Pending Prescriptions Disp Refills    escitalopram (LEXAPRO) 20 MG tablet [Pharmacy Med Name: ESCITALOPRAM 20MG TABLETS] 30 tablet 5     Sig: TAKE 1 TABLET BY MOUTH DAILY

## 2025-01-14 ASSESSMENT — PATIENT HEALTH QUESTIONNAIRE - PHQ9
6. FEELING BAD ABOUT YOURSELF - OR THAT YOU ARE A FAILURE OR HAVE LET YOURSELF OR YOUR FAMILY DOWN: NOT AT ALL
5. POOR APPETITE OR OVEREATING: NEARLY EVERY DAY
SUM OF ALL RESPONSES TO PHQ9 QUESTIONS 1 & 2: 2
7. TROUBLE CONCENTRATING ON THINGS, SUCH AS READING THE NEWSPAPER OR WATCHING TELEVISION: NOT AT ALL
SUM OF ALL RESPONSES TO PHQ QUESTIONS 1-9: 10
3. TROUBLE FALLING OR STAYING ASLEEP: NEARLY EVERY DAY
10. IF YOU CHECKED OFF ANY PROBLEMS, HOW DIFFICULT HAVE THESE PROBLEMS MADE IT FOR YOU TO DO YOUR WORK, TAKE CARE OF THINGS AT HOME, OR GET ALONG WITH OTHER PEOPLE: NOT DIFFICULT AT ALL
2. FEELING DOWN, DEPRESSED OR HOPELESS: SEVERAL DAYS
9. THOUGHTS THAT YOU WOULD BE BETTER OFF DEAD, OR OF HURTING YOURSELF: NOT AT ALL
5. POOR APPETITE OR OVEREATING: NEARLY EVERY DAY
1. LITTLE INTEREST OR PLEASURE IN DOING THINGS: SEVERAL DAYS
10. IF YOU CHECKED OFF ANY PROBLEMS, HOW DIFFICULT HAVE THESE PROBLEMS MADE IT FOR YOU TO DO YOUR WORK, TAKE CARE OF THINGS AT HOME, OR GET ALONG WITH OTHER PEOPLE: NOT DIFFICULT AT ALL
2. FEELING DOWN, DEPRESSED OR HOPELESS: SEVERAL DAYS
6. FEELING BAD ABOUT YOURSELF - OR THAT YOU ARE A FAILURE OR HAVE LET YOURSELF OR YOUR FAMILY DOWN: NOT AT ALL
3. TROUBLE FALLING OR STAYING ASLEEP: NEARLY EVERY DAY
4. FEELING TIRED OR HAVING LITTLE ENERGY: MORE THAN HALF THE DAYS
SUM OF ALL RESPONSES TO PHQ QUESTIONS 1-9: 10
4. FEELING TIRED OR HAVING LITTLE ENERGY: MORE THAN HALF THE DAYS
SUM OF ALL RESPONSES TO PHQ QUESTIONS 1-9: 10
SUM OF ALL RESPONSES TO PHQ QUESTIONS 1-9: 10
9. THOUGHTS THAT YOU WOULD BE BETTER OFF DEAD, OR OF HURTING YOURSELF: NOT AT ALL
SUM OF ALL RESPONSES TO PHQ QUESTIONS 1-9: 10
8. MOVING OR SPEAKING SO SLOWLY THAT OTHER PEOPLE COULD HAVE NOTICED. OR THE OPPOSITE - BEING SO FIDGETY OR RESTLESS THAT YOU HAVE BEEN MOVING AROUND A LOT MORE THAN USUAL: NOT AT ALL
8. MOVING OR SPEAKING SO SLOWLY THAT OTHER PEOPLE COULD HAVE NOTICED. OR THE OPPOSITE, BEING SO FIGETY OR RESTLESS THAT YOU HAVE BEEN MOVING AROUND A LOT MORE THAN USUAL: NOT AT ALL
7. TROUBLE CONCENTRATING ON THINGS, SUCH AS READING THE NEWSPAPER OR WATCHING TELEVISION: NOT AT ALL
1. LITTLE INTEREST OR PLEASURE IN DOING THINGS: SEVERAL DAYS

## 2025-01-17 ENCOUNTER — OFFICE VISIT (OUTPATIENT)
Dept: FAMILY MEDICINE CLINIC | Age: 35
End: 2025-01-17

## 2025-01-17 VITALS
HEART RATE: 76 BPM | TEMPERATURE: 96.4 F | DIASTOLIC BLOOD PRESSURE: 70 MMHG | BODY MASS INDEX: 77.86 KG/M2 | RESPIRATION RATE: 20 BRPM | WEIGHT: 293 LBS | OXYGEN SATURATION: 98 % | SYSTOLIC BLOOD PRESSURE: 126 MMHG

## 2025-01-17 DIAGNOSIS — Z79.4 TYPE 2 DIABETES MELLITUS WITHOUT COMPLICATION, WITH LONG-TERM CURRENT USE OF INSULIN (HCC): ICD-10-CM

## 2025-01-17 DIAGNOSIS — E78.00 PURE HYPERCHOLESTEROLEMIA: ICD-10-CM

## 2025-01-17 DIAGNOSIS — E11.9 TYPE 2 DIABETES MELLITUS WITHOUT COMPLICATION, WITH LONG-TERM CURRENT USE OF INSULIN (HCC): Primary | ICD-10-CM

## 2025-01-17 DIAGNOSIS — E78.00 PURE HYPERCHOLESTEROLEMIA: Chronic | ICD-10-CM

## 2025-01-17 DIAGNOSIS — Z79.4 TYPE 2 DIABETES MELLITUS WITHOUT COMPLICATION, WITH LONG-TERM CURRENT USE OF INSULIN (HCC): Primary | ICD-10-CM

## 2025-01-17 DIAGNOSIS — F41.8 DEPRESSION WITH ANXIETY: ICD-10-CM

## 2025-01-17 DIAGNOSIS — R03.0 ELEVATED BLOOD PRESSURE READING: ICD-10-CM

## 2025-01-17 DIAGNOSIS — E66.01 MORBID OBESITY WITH BMI OF 70 AND OVER, ADULT: ICD-10-CM

## 2025-01-17 DIAGNOSIS — J45.909 MILD ASTHMA WITHOUT COMPLICATION, UNSPECIFIED WHETHER PERSISTENT: Chronic | ICD-10-CM

## 2025-01-17 DIAGNOSIS — E11.9 TYPE 2 DIABETES MELLITUS WITHOUT COMPLICATION, WITH LONG-TERM CURRENT USE OF INSULIN (HCC): ICD-10-CM

## 2025-01-17 DIAGNOSIS — R11.0 NAUSEA: ICD-10-CM

## 2025-01-17 DIAGNOSIS — G47.33 OSA (OBSTRUCTIVE SLEEP APNEA): ICD-10-CM

## 2025-01-17 LAB
ALBUMIN SERPL-MCNC: 4.3 G/DL (ref 3.4–5)
ALBUMIN/GLOB SERPL: 1.7 {RATIO} (ref 1.1–2.2)
ALP SERPL-CCNC: 85 U/L (ref 40–129)
ALT SERPL-CCNC: 17 U/L (ref 10–40)
ANION GAP SERPL CALCULATED.3IONS-SCNC: 12 MMOL/L (ref 3–16)
AST SERPL-CCNC: 18 U/L (ref 15–37)
BILIRUB SERPL-MCNC: 0.3 MG/DL (ref 0–1)
BUN SERPL-MCNC: 8 MG/DL (ref 7–20)
CALCIUM SERPL-MCNC: 9.2 MG/DL (ref 8.3–10.6)
CHLORIDE SERPL-SCNC: 100 MMOL/L (ref 99–110)
CHOLEST SERPL-MCNC: 220 MG/DL (ref 0–199)
CO2 SERPL-SCNC: 24 MMOL/L (ref 21–32)
CREAT SERPL-MCNC: 0.6 MG/DL (ref 0.6–1.1)
CREAT UR-MCNC: 86.6 MG/DL (ref 28–259)
EST. AVERAGE GLUCOSE BLD GHB EST-MCNC: 260.4 MG/DL
GFR SERPLBLD CREATININE-BSD FMLA CKD-EPI: >90 ML/MIN/{1.73_M2}
GLUCOSE SERPL-MCNC: 100 MG/DL (ref 70–99)
HBA1C MFR BLD: 10.7 %
HDLC SERPL-MCNC: 70 MG/DL (ref 40–60)
LDLC SERPL CALC-MCNC: 133 MG/DL
MICROALBUMIN UR DL<=1MG/L-MCNC: <1.2 MG/DL
MICROALBUMIN/CREAT UR: NORMAL MG/G (ref 0–30)
POTASSIUM SERPL-SCNC: 3.9 MMOL/L (ref 3.5–5.1)
PROT SERPL-MCNC: 6.9 G/DL (ref 6.4–8.2)
SODIUM SERPL-SCNC: 136 MMOL/L (ref 136–145)
TRIGL SERPL-MCNC: 87 MG/DL (ref 0–150)
TSH SERPL DL<=0.005 MIU/L-ACNC: 2.65 UIU/ML (ref 0.27–4.2)
VLDLC SERPL CALC-MCNC: 17 MG/DL

## 2025-01-17 RX ORDER — LISINOPRIL 10 MG/1
TABLET ORAL
Qty: 30 TABLET | Refills: 5 | Status: CANCELLED | OUTPATIENT
Start: 2025-01-17

## 2025-01-17 RX ORDER — ACYCLOVIR 800 MG/1
TABLET ORAL
Qty: 2 EACH | Refills: 4 | Status: CANCELLED | OUTPATIENT
Start: 2025-01-17

## 2025-01-17 RX ORDER — BUSPIRONE HYDROCHLORIDE 7.5 MG/1
7.5 TABLET ORAL 2 TIMES DAILY
Qty: 60 TABLET | Refills: 5 | Status: CANCELLED | OUTPATIENT
Start: 2025-01-17

## 2025-01-17 RX ORDER — MONTELUKAST SODIUM 10 MG/1
10 TABLET ORAL NIGHTLY
Qty: 30 TABLET | Refills: 5 | Status: CANCELLED | OUTPATIENT
Start: 2025-01-17

## 2025-01-17 SDOH — ECONOMIC STABILITY: FOOD INSECURITY: WITHIN THE PAST 12 MONTHS, THE FOOD YOU BOUGHT JUST DIDN'T LAST AND YOU DIDN'T HAVE MONEY TO GET MORE.: PATIENT DECLINED

## 2025-01-17 SDOH — ECONOMIC STABILITY: FOOD INSECURITY: WITHIN THE PAST 12 MONTHS, YOU WORRIED THAT YOUR FOOD WOULD RUN OUT BEFORE YOU GOT MONEY TO BUY MORE.: NEVER TRUE

## 2025-01-17 SDOH — ECONOMIC STABILITY: FOOD INSECURITY: WITHIN THE PAST 12 MONTHS, THE FOOD YOU BOUGHT JUST DIDN'T LAST AND YOU DIDN'T HAVE MONEY TO GET MORE.: NEVER TRUE

## 2025-01-17 SDOH — ECONOMIC STABILITY: INCOME INSECURITY: IN THE LAST 12 MONTHS, WAS THERE A TIME WHEN YOU WERE NOT ABLE TO PAY THE MORTGAGE OR RENT ON TIME?: PATIENT DECLINED

## 2025-01-17 SDOH — ECONOMIC STABILITY: TRANSPORTATION INSECURITY
IN THE PAST 12 MONTHS, HAS THE LACK OF TRANSPORTATION KEPT YOU FROM MEDICAL APPOINTMENTS OR FROM GETTING MEDICATIONS?: PATIENT DECLINED

## 2025-01-17 SDOH — ECONOMIC STABILITY: TRANSPORTATION INSECURITY
IN THE PAST 12 MONTHS, HAS LACK OF TRANSPORTATION KEPT YOU FROM MEETINGS, WORK, OR FROM GETTING THINGS NEEDED FOR DAILY LIVING?: PATIENT DECLINED

## 2025-01-17 SDOH — ECONOMIC STABILITY: FOOD INSECURITY: WITHIN THE PAST 12 MONTHS, YOU WORRIED THAT YOUR FOOD WOULD RUN OUT BEFORE YOU GOT MONEY TO BUY MORE.: PATIENT DECLINED

## 2025-01-17 NOTE — PROGRESS NOTES
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  Pt states that she is spending a lot of time at work, and had 2 part time jobs on top of that.  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  Pt does get out to movies and is socializing with workers.  Pt went to friends house for holidays.  Pt is not getting up here frequently but does see parents frequently    Pt is tracking blood sugars, and did have bloodwork done today with results pending.  Pt is on semaglutide and jardiance, as well as her insulin.  Pts blood sugars are doing well overall, are doing overall well.  GMI is at 6.9 but she feels it will be higher.  Not eating as well.  Pt is exercising qAM    Pt feels mood is doing well, and does have a counselor that she meets with regularly, sees her every 6 weeks or so but is going to try and move that up to monthly.  Pt feels that she is waiting for bad things to happen.  Pt remains on wellbutrin, lexapro and buspar,     Pt has noted that she does get nausea, doesn't relate to semaglutide.  Pt does rarely vomit.  It does not relate to eating at all, but feels it relates more to meds.  Seems with taking night-time meds.  Pt does have zofran and has to take regularly.  Pt does not get any GERD issues.        Except as noted above in the history of present illness, the review of systems is  negative for headache, vision changes, chest pain, shortness of breath, abdominal pain, urinary sx, bowel changes.    Past medical, surgical, and social history reviewed and updated  Medications and allergies reviewed and updated      O: /70   Pulse 76   Temp (!) 96.4 °F (35.8 °C)   Resp 20   Wt (!) 196.2 kg (432 lb 9.6 oz)   SpO2 98%   BMI 77.86 kg/m²   GEN: No acute distress, cooperative, well nourished, alert.   HEENT: ALMA, AMALIAMI ,

## 2025-01-30 ENCOUNTER — PATIENT MESSAGE (OUTPATIENT)
Dept: FAMILY MEDICINE CLINIC | Age: 35
End: 2025-01-30

## 2025-01-30 RX ORDER — ROSUVASTATIN CALCIUM 10 MG/1
10 TABLET, COATED ORAL DAILY
Qty: 30 TABLET | Refills: 5 | Status: SHIPPED | OUTPATIENT
Start: 2025-01-30

## 2025-02-03 ENCOUNTER — PATIENT MESSAGE (OUTPATIENT)
Dept: FAMILY MEDICINE CLINIC | Age: 35
End: 2025-02-03

## 2025-02-03 DIAGNOSIS — Z79.4 TYPE 2 DIABETES MELLITUS WITHOUT COMPLICATION, WITH LONG-TERM CURRENT USE OF INSULIN (HCC): Primary | ICD-10-CM

## 2025-02-03 DIAGNOSIS — E11.9 TYPE 2 DIABETES MELLITUS WITHOUT COMPLICATION, WITH LONG-TERM CURRENT USE OF INSULIN (HCC): Primary | ICD-10-CM

## 2025-02-04 RX ORDER — LISINOPRIL 10 MG/1
TABLET ORAL
Qty: 30 TABLET | Refills: 5 | Status: SHIPPED | OUTPATIENT
Start: 2025-02-04

## 2025-02-04 NOTE — TELEPHONE ENCOUNTER
Medication:   Requested Prescriptions     Pending Prescriptions Disp Refills    lisinopril (PRINIVIL;ZESTRIL) 10 MG tablet [Pharmacy Med Name: LISINOPRIL 10MG TABLETS] 30 tablet 5     Sig: TAKE 1 TABLET BY MOUTH DAILY     Last Filled:  12/05/2024    Last appt: 1/17/2025   Next appt: 4/17/2025    Last OARRS:       11/15/2017    10:12 PM   RX Monitoring   Attestation The Prescription Monitoring Report for this patient was reviewed today.   Periodic Controlled Substance Monitoring Possible medication side effects, risk of tolerance and/or dependence, and alternative treatments discussed.;No signs of potential drug abuse or diversion identified.

## 2025-02-21 ENCOUNTER — PATIENT MESSAGE (OUTPATIENT)
Dept: FAMILY MEDICINE CLINIC | Age: 35
End: 2025-02-21

## 2025-02-22 RX ORDER — HYDROCHLOROTHIAZIDE 12.5 MG/1
CAPSULE ORAL
Qty: 2 EACH | Refills: 11 | Status: SHIPPED | OUTPATIENT
Start: 2025-02-22

## 2025-03-06 DIAGNOSIS — Z79.4 TYPE 2 DIABETES MELLITUS WITHOUT COMPLICATION, WITH LONG-TERM CURRENT USE OF INSULIN (HCC): ICD-10-CM

## 2025-03-06 DIAGNOSIS — E11.9 TYPE 2 DIABETES MELLITUS WITHOUT COMPLICATION, WITH LONG-TERM CURRENT USE OF INSULIN (HCC): ICD-10-CM

## 2025-03-06 RX ORDER — TIRZEPATIDE 2.5 MG/.5ML
INJECTION, SOLUTION SUBCUTANEOUS
Qty: 2 ML | Refills: 0 | Status: SHIPPED | OUTPATIENT
Start: 2025-03-06

## 2025-03-06 NOTE — TELEPHONE ENCOUNTER
Medication:   Requested Prescriptions     Pending Prescriptions Disp Refills    MOUNJARO 2.5 MG/0.5ML SOAJ [Pharmacy Med Name: MOUNJARO 2.5MG/0.5ML INJ ( 4 PENS)] 2 mL 0     Sig: ADMINISTER 2.5 MG UNDER THE SKIN EVERY 7 DAYS     Last Filled:      Last appt: 1/17/2025   Next appt: 4/17/2025

## 2025-03-10 ENCOUNTER — TELEPHONE (OUTPATIENT)
Dept: ADMINISTRATIVE | Age: 35
End: 2025-03-10

## 2025-03-10 NOTE — TELEPHONE ENCOUNTER
Submitted PA for Mounjaro 2.5MG/0.5ML auto-injectors   Via CMM (Key: [id number]) STATUS: PENDING.    Follow up done daily; if no decision with in three days we will refax.  If another three days goes by with no decision will call the insurance for status.

## 2025-03-11 NOTE — TELEPHONE ENCOUNTER
The medication is APPROVED THRU 03/09/26    CaseId:[id number];Status:Approved;Review Type:Prior Auth;Coverage Start Date:03/10/2025;Coverage End Date:03/10/2026;;CaseId:[id number];Status:Denied;Review Type:Qty;Appeal Information:;  Effective Date: 3/9/2025  Authorization Expiration Date: 3/9/2026    If this requires a response please respond to the pool ( P MHCX PSC MEDICATION PRE-AUTH).      Thank you please advise patient.

## 2025-03-17 ENCOUNTER — TELEPHONE (OUTPATIENT)
Dept: FAMILY MEDICINE CLINIC | Age: 35
End: 2025-03-17

## 2025-03-17 NOTE — TELEPHONE ENCOUNTER
Prior authorization for medication MOUNJARO 2.5MG/0.5 PEN INJCTR has been DENIED by insurance [employer] due to QUANTITY LIMIT OF MAX 2MLS  DAYS. Please advise on appeal or change in medications. Please see attached scanned denial letter for more information.

## 2025-03-18 RX ORDER — TIRZEPATIDE 5 MG/.5ML
5 INJECTION, SOLUTION SUBCUTANEOUS WEEKLY
Qty: 2 ML | Refills: 5 | Status: SHIPPED | OUTPATIENT
Start: 2025-03-18

## 2025-03-18 NOTE — TELEPHONE ENCOUNTER
Patient states that they are doing really well on Mounjaro, and is OK to increase to 5mg. Patient asked if we can appeal the denial

## 2025-05-13 RX ORDER — BUSPIRONE HYDROCHLORIDE 7.5 MG/1
7.5 TABLET ORAL 2 TIMES DAILY
Qty: 60 TABLET | Refills: 5 | Status: SHIPPED | OUTPATIENT
Start: 2025-05-13

## 2025-05-13 NOTE — TELEPHONE ENCOUNTER
Medication:   Requested Prescriptions     Pending Prescriptions Disp Refills    busPIRone (BUSPAR) 7.5 MG tablet [Pharmacy Med Name: BUSPIRONE 7.5MG TABLETS] 60 tablet 5     Sig: TAKE 1 TABLET BY MOUTH TWICE DAILY     Last Filled:      Last appt: 1/17/2025   Next appt: 5/29/2025         Please schedule her for yearly visit  Hasn't been seen since 2018  Electronically signed by BRIANA Jorge CNP on 10/5/2020 at 9:44 AM

## 2025-05-27 DIAGNOSIS — Z79.4 CONTROLLED TYPE 2 DIABETES MELLITUS WITH COMPLICATION, WITH LONG-TERM CURRENT USE OF INSULIN (HCC): ICD-10-CM

## 2025-05-27 DIAGNOSIS — E11.8 CONTROLLED TYPE 2 DIABETES MELLITUS WITH COMPLICATION, WITH LONG-TERM CURRENT USE OF INSULIN (HCC): ICD-10-CM

## 2025-05-27 RX ORDER — EMPAGLIFLOZIN 10 MG/1
10 TABLET, FILM COATED ORAL DAILY
Qty: 30 TABLET | Refills: 0 | Status: SHIPPED | OUTPATIENT
Start: 2025-05-27

## 2025-05-27 NOTE — TELEPHONE ENCOUNTER
Medication:   Requested Prescriptions     Pending Prescriptions Disp Refills    JARDIANCE 10 MG tablet [Pharmacy Med Name: JARDIANCE 10MG TABLETS] 90 tablet 1     Sig: TAKE 1 TABLET BY MOUTH DAILY       Last Filled:      Patient Phone Number: [phone] (home)     Last appt: 8/2/2024   Next appt: Visit date not found    Last Labs DM:   Lab Results   Component Value Date/Time    LABA1C 10.7 01/17/2025 10:33 AM

## 2025-05-28 ENCOUNTER — TELEPHONE (OUTPATIENT)
Dept: ENDOCRINOLOGY | Age: 35
End: 2025-05-28

## 2025-05-28 NOTE — TELEPHONE ENCOUNTER
Submitted PA for Jardiance  Via CMM Key: [id number]   STATUS: Approved today by [employer] 2017  CaseId:[id number];Status:Approved;Review Type:Prior Auth;Coverage Start Date:05/28/2025;Coverage End Date:12/31/2099;  Effective Date: 5/28/2025  Authorization Expiration Date: 12/31/2099    If this requires a response please respond to the pool ( P MHCX PSC MEDICATION PRE-AUTH).      Thank you please advise patient.

## 2025-05-29 ENCOUNTER — TELEMEDICINE (OUTPATIENT)
Dept: FAMILY MEDICINE CLINIC | Age: 35
End: 2025-05-29
Payer: COMMERCIAL

## 2025-05-29 DIAGNOSIS — Z79.4 TYPE 2 DIABETES MELLITUS WITHOUT COMPLICATION, WITH LONG-TERM CURRENT USE OF INSULIN (HCC): Primary | ICD-10-CM

## 2025-05-29 DIAGNOSIS — G47.33 OSA (OBSTRUCTIVE SLEEP APNEA): ICD-10-CM

## 2025-05-29 DIAGNOSIS — E55.9 VITAMIN D DEFICIENCY: ICD-10-CM

## 2025-05-29 DIAGNOSIS — E66.01 MORBID OBESITY WITH BMI OF 70 AND OVER, ADULT (HCC): ICD-10-CM

## 2025-05-29 DIAGNOSIS — E78.00 PURE HYPERCHOLESTEROLEMIA: ICD-10-CM

## 2025-05-29 DIAGNOSIS — F32.0 MAJOR DEPRESSIVE DISORDER, SINGLE EPISODE, MILD: Chronic | ICD-10-CM

## 2025-05-29 DIAGNOSIS — F41.8 DEPRESSION WITH ANXIETY: ICD-10-CM

## 2025-05-29 DIAGNOSIS — E11.9 TYPE 2 DIABETES MELLITUS WITHOUT COMPLICATION, WITH LONG-TERM CURRENT USE OF INSULIN (HCC): Primary | ICD-10-CM

## 2025-05-29 PROCEDURE — 3046F HEMOGLOBIN A1C LEVEL >9.0%: CPT | Performed by: FAMILY MEDICINE

## 2025-05-29 PROCEDURE — 99214 OFFICE O/P EST MOD 30 MIN: CPT | Performed by: FAMILY MEDICINE

## 2025-05-29 NOTE — PROGRESS NOTES
Here for f/u and recheck of diabetes mellitus, obesity, obstructive sleep apnea.      Pt states that things are doing well overall, pt is doing well with people, and is getting out more frequently.  Pt is enjoying it, and work is doing great.  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      Pt states that blood sugars are doing better overall.  Pt was off jardiance for a few weeks and with restarting did notice that they are improved.  blood sugars are not too low.  Pt has CGM and has noted some decrease in blood sugars to 60. Felt nausea but otherwise.  Pt on 26-32 with meals and at night 136 uints qhs.  Pt does remain on mounjaro.  Pts weight prior to start of mounjaro was 460# and now down at 435#.  Pt would like ot consider increase in dose.  Pt is trying to walk regularly     Pts mood is doing well, did get in to see therapy but was not a good fit.  Pt remains on lexapro and wellbutrin, as well as buspar.  Pt feels she has been 'dark and twisty'.  No ST/SI.      Except as noted above in the history of present illness, the review of systems is  negative for headache, vision changes, chest pain, shortness of breath, abdominal pain, urinary sx, bowel changes.    Past medical, surgical, and social history reviewed and updated  Medications and allergies reviewed and updated        Patient was evaluated through a synchronous (real-time) audio-video encounter. The patient (or guardian if applicable) is aware that this is a billable service, which includes applicable co-pays. This Virtual Visit was conducted with patient's (and/or legal guardian's) consent. Patient identification was verified, and a caregiver was present when appropriate.   The patient was located at Home: [address] [place], [place] [id number]  Provider was located at Facility (Appt Dept): [address]  [facility]  [address]  [place] [phone]  Confirm you are appropriately licensed, registered, or certified to deliver care in the

## 2025-07-10 RX ORDER — INSULIN DEGLUDEC 200 U/ML
INJECTION, SOLUTION SUBCUTANEOUS
Qty: 5 ADJUSTABLE DOSE PRE-FILLED PEN SYRINGE | Refills: 0 | Status: SHIPPED | OUTPATIENT
Start: 2025-07-10

## 2025-07-10 NOTE — TELEPHONE ENCOUNTER
Medication:   Requested Prescriptions     Pending Prescriptions Disp Refills    Insulin Degludec (TRESIBA FLEXTOUCH) 200 UNIT/ML SOPN [Pharmacy Med Name: TRESIBA FLEXTOUCH PEN(U-200)INJ 3ML] 15 mL      Sig: ADMINISTER 136 UNITS UNDER THE SKIN DAILY       Last Filled:      Patient Phone Number: [phone] (home)     Last appt: 08/02/2024   Next appt: 7/16/2025    Last Labs DM:   Lab Results   Component Value Date/Time    LABA1C 10.7 01/17/2025 10:33 AM

## 2025-07-14 DIAGNOSIS — Z79.4 TYPE 2 DIABETES MELLITUS WITHOUT COMPLICATION, WITH LONG-TERM CURRENT USE OF INSULIN (HCC): ICD-10-CM

## 2025-07-14 DIAGNOSIS — E11.9 TYPE 2 DIABETES MELLITUS WITHOUT COMPLICATION, WITH LONG-TERM CURRENT USE OF INSULIN (HCC): ICD-10-CM

## 2025-07-14 LAB
ALBUMIN SERPL-MCNC: 4.2 G/DL (ref 3.4–5)
ALBUMIN/GLOB SERPL: 1.8 {RATIO} (ref 1.1–2.2)
ALP SERPL-CCNC: 83 U/L (ref 40–129)
ALT SERPL-CCNC: 19 U/L (ref 10–40)
ANION GAP SERPL CALCULATED.3IONS-SCNC: 15 MMOL/L (ref 3–16)
AST SERPL-CCNC: 21 U/L (ref 15–37)
BASOPHILS # BLD: 0 K/UL (ref 0–0.2)
BASOPHILS NFR BLD: 0.4 %
BILIRUB SERPL-MCNC: 0.6 MG/DL (ref 0–1)
BUN SERPL-MCNC: 10 MG/DL (ref 7–20)
CALCIUM SERPL-MCNC: 8.9 MG/DL (ref 8.3–10.6)
CHLORIDE SERPL-SCNC: 103 MMOL/L (ref 99–110)
CHOLEST SERPL-MCNC: 161 MG/DL (ref 0–199)
CO2 SERPL-SCNC: 23 MMOL/L (ref 21–32)
CREAT SERPL-MCNC: 0.6 MG/DL (ref 0.6–1.1)
CREAT UR-MCNC: 137 MG/DL (ref 28–259)
DEPRECATED RDW RBC AUTO: 13.9 % (ref 12.4–15.4)
EOSINOPHIL # BLD: 0.1 K/UL (ref 0–0.6)
EOSINOPHIL NFR BLD: 0.8 %
EST. AVERAGE GLUCOSE BLD GHB EST-MCNC: 205.9 MG/DL
GFR SERPLBLD CREATININE-BSD FMLA CKD-EPI: >90 ML/MIN/{1.73_M2}
GLUCOSE SERPL-MCNC: 176 MG/DL (ref 70–99)
HBA1C MFR BLD: 8.8 %
HCT VFR BLD AUTO: 40.5 % (ref 36–48)
HDLC SERPL-MCNC: 52 MG/DL (ref 40–60)
HGB BLD-MCNC: 13.7 G/DL (ref 12–16)
LDLC SERPL CALC-MCNC: 94 MG/DL
LYMPHOCYTES # BLD: 2.6 K/UL (ref 1–5.1)
LYMPHOCYTES NFR BLD: 23.8 %
MCH RBC QN AUTO: 26.8 PG (ref 26–34)
MCHC RBC AUTO-ENTMCNC: 33.9 G/DL (ref 31–36)
MCV RBC AUTO: 78.9 FL (ref 80–100)
MICROALBUMIN UR DL<=1MG/L-MCNC: 1.43 MG/DL
MICROALBUMIN/CREAT UR: 10.4 MG/G (ref 0–30)
MONOCYTES # BLD: 0.6 K/UL (ref 0–1.3)
MONOCYTES NFR BLD: 5.4 %
NEUTROPHILS # BLD: 7.7 K/UL (ref 1.7–7.7)
NEUTROPHILS NFR BLD: 69.6 %
PLATELET # BLD AUTO: 283 K/UL (ref 135–450)
PMV BLD AUTO: 8.3 FL (ref 5–10.5)
POTASSIUM SERPL-SCNC: 3.8 MMOL/L (ref 3.5–5.1)
PROT SERPL-MCNC: 6.6 G/DL (ref 6.4–8.2)
RBC # BLD AUTO: 5.14 M/UL (ref 4–5.2)
SODIUM SERPL-SCNC: 141 MMOL/L (ref 136–145)
TRIGL SERPL-MCNC: 77 MG/DL (ref 0–150)
TSH SERPL DL<=0.005 MIU/L-ACNC: 1.5 UIU/ML (ref 0.27–4.2)
VLDLC SERPL CALC-MCNC: 15 MG/DL
WBC # BLD AUTO: 11.1 K/UL (ref 4–11)

## 2025-07-16 ENCOUNTER — PATIENT MESSAGE (OUTPATIENT)
Dept: FAMILY MEDICINE CLINIC | Age: 35
End: 2025-07-16

## 2025-07-16 ENCOUNTER — OFFICE VISIT (OUTPATIENT)
Dept: FAMILY MEDICINE CLINIC | Age: 35
End: 2025-07-16
Payer: COMMERCIAL

## 2025-07-16 ENCOUNTER — OFFICE VISIT (OUTPATIENT)
Dept: GYNECOLOGY | Age: 35
End: 2025-07-16
Payer: COMMERCIAL

## 2025-07-16 ENCOUNTER — OFFICE VISIT (OUTPATIENT)
Dept: ENDOCRINOLOGY | Age: 35
End: 2025-07-16
Payer: COMMERCIAL

## 2025-07-16 VITALS
SYSTOLIC BLOOD PRESSURE: 124 MMHG | BODY MASS INDEX: 51.91 KG/M2 | WEIGHT: 293 LBS | OXYGEN SATURATION: 96 % | HEIGHT: 63 IN | HEART RATE: 91 BPM | DIASTOLIC BLOOD PRESSURE: 74 MMHG

## 2025-07-16 VITALS
SYSTOLIC BLOOD PRESSURE: 137 MMHG | HEART RATE: 89 BPM | BODY MASS INDEX: 76.7 KG/M2 | DIASTOLIC BLOOD PRESSURE: 86 MMHG | WEIGHT: 293 LBS | RESPIRATION RATE: 96 BRPM

## 2025-07-16 VITALS
BODY MASS INDEX: 51.91 KG/M2 | HEIGHT: 63 IN | DIASTOLIC BLOOD PRESSURE: 78 MMHG | WEIGHT: 293 LBS | OXYGEN SATURATION: 98 % | HEART RATE: 86 BPM | SYSTOLIC BLOOD PRESSURE: 126 MMHG | RESPIRATION RATE: 17 BRPM

## 2025-07-16 DIAGNOSIS — F41.8 DEPRESSION WITH ANXIETY: ICD-10-CM

## 2025-07-16 DIAGNOSIS — E78.00 PURE HYPERCHOLESTEROLEMIA: Chronic | ICD-10-CM

## 2025-07-16 DIAGNOSIS — Z79.4 TYPE 2 DIABETES MELLITUS WITHOUT COMPLICATION, WITH LONG-TERM CURRENT USE OF INSULIN (HCC): Primary | ICD-10-CM

## 2025-07-16 DIAGNOSIS — E11.8 CONTROLLED TYPE 2 DIABETES MELLITUS WITH COMPLICATION, WITH LONG-TERM CURRENT USE OF INSULIN (HCC): ICD-10-CM

## 2025-07-16 DIAGNOSIS — Z11.3 ROUTINE SCREENING FOR STI (SEXUALLY TRANSMITTED INFECTION): ICD-10-CM

## 2025-07-16 DIAGNOSIS — G47.33 OSA (OBSTRUCTIVE SLEEP APNEA): ICD-10-CM

## 2025-07-16 DIAGNOSIS — R03.0 ELEVATED BLOOD PRESSURE READING: ICD-10-CM

## 2025-07-16 DIAGNOSIS — E11.65 UNCONTROLLED TYPE 2 DIABETES MELLITUS WITH HYPERGLYCEMIA (HCC): Primary | ICD-10-CM

## 2025-07-16 DIAGNOSIS — Z30.09 BIRTH CONTROL COUNSELING: ICD-10-CM

## 2025-07-16 DIAGNOSIS — E11.9 TYPE 2 DIABETES MELLITUS WITHOUT COMPLICATION, WITH LONG-TERM CURRENT USE OF INSULIN (HCC): Primary | ICD-10-CM

## 2025-07-16 DIAGNOSIS — E66.01 MORBID OBESITY WITH BMI OF 70 AND OVER, ADULT (HCC): ICD-10-CM

## 2025-07-16 DIAGNOSIS — Z01.419 WELL WOMAN EXAM WITH ROUTINE GYNECOLOGICAL EXAM: Primary | ICD-10-CM

## 2025-07-16 DIAGNOSIS — F32.0 MAJOR DEPRESSIVE DISORDER, SINGLE EPISODE, MILD: Chronic | ICD-10-CM

## 2025-07-16 DIAGNOSIS — Z79.4 CONTROLLED TYPE 2 DIABETES MELLITUS WITH COMPLICATION, WITH LONG-TERM CURRENT USE OF INSULIN (HCC): ICD-10-CM

## 2025-07-16 PROCEDURE — 99214 OFFICE O/P EST MOD 30 MIN: CPT | Performed by: FAMILY MEDICINE

## 2025-07-16 PROCEDURE — 95251 CONT GLUC MNTR ANALYSIS I&R: CPT | Performed by: INTERNAL MEDICINE

## 2025-07-16 PROCEDURE — 99395 PREV VISIT EST AGE 18-39: CPT | Performed by: OBSTETRICS & GYNECOLOGY

## 2025-07-16 PROCEDURE — 3052F HG A1C>EQUAL 8.0%<EQUAL 9.0%: CPT | Performed by: FAMILY MEDICINE

## 2025-07-16 PROCEDURE — 99214 OFFICE O/P EST MOD 30 MIN: CPT | Performed by: INTERNAL MEDICINE

## 2025-07-16 PROCEDURE — 3052F HG A1C>EQUAL 8.0%<EQUAL 9.0%: CPT | Performed by: INTERNAL MEDICINE

## 2025-07-16 RX ORDER — FLUCONAZOLE 150 MG/1
150 TABLET ORAL
Qty: 3 TABLET | Refills: 3 | Status: SHIPPED | OUTPATIENT
Start: 2025-07-16

## 2025-07-16 RX ORDER — NORGESTIMATE AND ETHINYL ESTRADIOL 0.25-0.035
1 KIT ORAL DAILY
Qty: 3 PACKET | Refills: 3 | Status: SHIPPED | OUTPATIENT
Start: 2025-07-16

## 2025-07-16 ASSESSMENT — ENCOUNTER SYMPTOMS
ALLERGIC/IMMUNOLOGIC NEGATIVE: 1
GASTROINTESTINAL NEGATIVE: 1
RESPIRATORY NEGATIVE: 1
EYES NEGATIVE: 1

## 2025-07-16 NOTE — PROGRESS NOTES
Seen as f/u patient for diabetes        Interim;     Lost 30 lb on mounjaro  Reports had been upto 460 lb    Using CGM  Reports nausea when has a low glucose  Has not noticed after ozempic    Needs to loose more weight for the surgery but does not plan for it currently    Diagnosed with Type 2 diabetes mellitus at age 14 years  Known diabetic complications: none   Uncontrolled  Insulin started a few year after diagnosis  On metformin initially at time of diagnosis    Current diabetic medications     Tresiba 130units  Ozempic 2mg  Humalog U-200 24 units AC TID     SSI 2 for 50 >150  Jardiance 10mg  takes diflucan once a month as per Gyn    Did not tolerate metformin    Last A1c 8.8%<----10.7%<--- 7.2%<----9.9%<----6.8%<------ 6.2%<----- 8%<---- 7.8%<----- 9.6%<----- 11.8%<-----9.6%<-----9.4%<-----10.1%<------- 11.7 on 7/18 <--- 11.3 <--- 10.9    Prior visit with dietician: Yes   Current diet: on average, 3 meals per day   Current exercise: walking   Current monitoring regimen: home blood tests -     Dexcom  Last 2 weeks  63 % in range  37 2 % high  Average 177  132-350      Any episodes of hypoglycemia? --    No Hx of CAD , PVD, CVA    Hyperlipidemia:   Not on medication  uncontrolled   on 7/18   on 1/19   on 8/20   on 11/21  LDL 94 on 7/25  on crestor 10mg    Last eye exam: 2024  Last foot exam: 1/19   PCP exam today  Last microalbumin to creatinine ratio:7/25    She has a h/o PCOS, on OCP    H/o obesity. Has been gaining weight. She is on low CHO diet  Does reports ate more, increased appetite    She has vit D deficiency  Last 7 on 4.19    Takes vit D 50,000 IU1/week  Restarted 4/21  Level was 18      SH: Mom is our clinic patient, maureen carrillo    Past Medical History:   Diagnosis Date    Acne     Allergic rhinitis     Anxiety     Asthma     Cellulitis     on back/ on bactrim for/ pcp aware    COVID-19 12/2020    Depression     Diabetes mellitus type 1 (HCC) 5/07    Folic acid

## 2025-07-16 NOTE — PROGRESS NOTES
Here for f/u and recheck of diabetes mellitus, obesity, cristy, mood    Pt states that things are doing well, is having a nice summer.  Pt will be going to concert in [place] with friend.  Pt is feeling well, and mood/job is doing well.  Pt feels very steady.      Pts blood sugars are improving although still high.  Pts A1c has dropped by 2 points down to 8.8, was prior at 10.7.  pt is doing better with adherence to meds and is working on lifestyle mgt as she can.  Pt remains on jardiance, insulin therpay with humalog and tresiba, and mounjaro at 7.5mg.  pt has new libre3 but does not like it, as it tends to fall off frequently.  Has had some issues of signal loss as well.  Pt does really like the tirzepatide, and is down 60# with med.  Pt would like to increase mounjaro but will wait to see endocrine     Here for f/u of cholesterol.  Pt has been working on diet/exercise and is consistent with therapy.  No side effects from current therapy.  No chest pain, shortness of breath.  No numbness/tingling in extremities.  Denies any evidence of myalgias, arthrlagias    Pt here for follow up of blood pressure.  Pt states doing great with adherence to therapy and feels well.  No issues of chest pain, shortness of breath.  No vision changes, headache, swelling in legs.     Mood doing well, remains on buspar and lexapro, as well as wellbutrin XL 300mg qd      Except as noted above in the history of present illness, the review of systems is  negative for headache, vision changes, chest pain, shortness of breath, abdominal pain, urinary sx, bowel changes.    Past medical, surgical, and social history reviewed and updated  Medications and allergies reviewed and updated        O: /74   Pulse 91   Ht 1.6 m (5' 3\")   Wt (!) 194.6 kg (429 lb)   SpO2 96%   BMI 75.99 kg/m²   GEN: No acute distress, cooperative, well nourished, alert.   HEENT: PEERLA, EOMI , normocephalic/atraumatic, nares and oropharynx clear.  Mucous

## 2025-07-17 LAB
C TRACH DNA CVX QL NAA+PROBE: NEGATIVE
N GONORRHOEA DNA CERV MUCUS QL NAA+PROBE: NEGATIVE

## 2025-07-17 NOTE — PROGRESS NOTES
Subjective   Patient ID: Tiffani Melgoza is a 34 y.o. female.    Patient is here for annual. Patient wants STI screen.     Gynecologic Exam        Review of Systems   Constitutional: Negative.    HENT: Negative.     Eyes: Negative.    Respiratory: Negative.     Cardiovascular: Negative.    Gastrointestinal: Negative.    Endocrine: Negative.    Genitourinary: Negative.    Musculoskeletal: Negative.    Skin: Negative.    Allergic/Immunologic: Negative.    Neurological: Negative.    Hematological: Negative.    Psychiatric/Behavioral: Negative.       Date of Birth 1990  Past Medical History:   Diagnosis Date    Acne     Allergic rhinitis     Anxiety     Asthma     Cellulitis     on back/ on bactrim for/ pcp aware    COVID-19 2020    Depression     Diabetes mellitus type 1 (HCC)     Folic acid deficiency     Hyperlipidemia     Morbid obesity with BMI of 70 and over, adult ([facility])     Obesity     Obstructive sleep apnea 2011    TAY (obstructive sleep apnea)     PCOS (polycystic ovarian syndrome)     Pre-operative clearance     Pyelonephritis     left    Vitamin D deficiency      Past Surgical History:   Procedure Laterality Date    CHOLECYSTECTOMY  1/15/2013    MULTIPORT ROBOTIC ASSISTED LAPAROSCOPIC CHOLECYSTECTOMY    CYST REMOVAL      OTHER SURGICAL HISTORY      Cyst/Mass excised from posterior neck    OTHER SURGICAL HISTORY  12/15    excision back lesion-Dr. Gamez    WISDOM TOOTH EXTRACTION       OB History    Para Term  AB Living   0 0 0 0 0 0   SAB IAB Ectopic Molar Multiple Live Births   0 0 0 0 0 0     Social History     Socioeconomic History    Marital status: Single     Spouse name: Not on file    Number of children: Not on file    Years of education: Not on file    Highest education level: Not on file   Occupational History    Not on file   Tobacco Use    Smoking status: Never    Smokeless tobacco: Never   Vaping Use    Vaping status: Never Used   Substance and Sexual

## 2025-07-30 DIAGNOSIS — E11.65 UNCONTROLLED TYPE 2 DIABETES MELLITUS WITH HYPERGLYCEMIA (HCC): Primary | ICD-10-CM

## 2025-07-30 RX ORDER — INSULIN DEGLUDEC 200 U/ML
INJECTION, SOLUTION SUBCUTANEOUS
Qty: 15 ML | Refills: 1 | Status: SHIPPED | OUTPATIENT
Start: 2025-07-30

## 2025-07-30 RX ORDER — MONTELUKAST SODIUM 10 MG/1
10 TABLET ORAL NIGHTLY
Qty: 30 TABLET | Refills: 5 | Status: SHIPPED | OUTPATIENT
Start: 2025-07-30

## 2025-07-30 NOTE — TELEPHONE ENCOUNTER
Medication:   Requested Prescriptions     Pending Prescriptions Disp Refills    TRESIBA FLEXTOUCH 200 UNIT/ML SOPN [Pharmacy Med Name: TRESIBA FLEXTOUCH PEN(U-200)INJ 3ML] 15 mL      Sig: ADMINISTER 130 UNITS UNDER THE SKIN DAILY       Last Filled:      Patient Phone Number: [phone] (home)     Last appt: 5/23/2024   Next appt: 10/31/25    Last Labs DM:   Lab Results   Component Value Date/Time    LABA1C 8.8 07/14/2025 11:13 AM

## 2025-08-15 DIAGNOSIS — E11.65 UNCONTROLLED TYPE 2 DIABETES MELLITUS WITH HYPERGLYCEMIA (HCC): Primary | ICD-10-CM

## 2025-08-15 RX ORDER — ONDANSETRON 4 MG/1
4 TABLET, FILM COATED ORAL EVERY 8 HOURS PRN
Qty: 30 TABLET | Refills: 2 | Status: SHIPPED | OUTPATIENT
Start: 2025-08-15

## 2025-08-15 RX ORDER — INSULIN LISPRO 200 [IU]/ML
INJECTION, SOLUTION SUBCUTANEOUS
Qty: 30 ML | Refills: 2 | Status: SHIPPED | OUTPATIENT
Start: 2025-08-15

## 2025-08-15 RX ORDER — BUPROPION HYDROCHLORIDE 300 MG/1
300 TABLET ORAL EVERY MORNING
Qty: 30 TABLET | Refills: 5 | Status: SHIPPED | OUTPATIENT
Start: 2025-08-15